# Patient Record
Sex: FEMALE | Race: OTHER | HISPANIC OR LATINO | Employment: OTHER | URBAN - METROPOLITAN AREA
[De-identification: names, ages, dates, MRNs, and addresses within clinical notes are randomized per-mention and may not be internally consistent; named-entity substitution may affect disease eponyms.]

---

## 2024-03-14 ENCOUNTER — HOSPITAL ENCOUNTER (INPATIENT)
Facility: HOSPITAL | Age: 69
LOS: 5 days | Discharge: HOME | DRG: 871 | End: 2024-03-19
Attending: STUDENT IN AN ORGANIZED HEALTH CARE EDUCATION/TRAINING PROGRAM | Admitting: HOSPITALIST
Payer: MEDICARE

## 2024-03-14 DIAGNOSIS — R79.89 ELEVATED LFTS: ICD-10-CM

## 2024-03-14 DIAGNOSIS — C54.1 ENDOMETRIAL ADENOCARCINOMA: ICD-10-CM

## 2024-03-14 DIAGNOSIS — R00.0 TACHYCARDIA: ICD-10-CM

## 2024-03-14 DIAGNOSIS — R07.9 CHEST PAIN: ICD-10-CM

## 2024-03-14 DIAGNOSIS — A41.9 SEPSIS: Primary | ICD-10-CM

## 2024-03-14 DIAGNOSIS — N17.9 AKI (ACUTE KIDNEY INJURY): ICD-10-CM

## 2024-03-14 PROBLEM — E87.6 HYPOKALEMIA: Status: ACTIVE | Noted: 2024-03-14

## 2024-03-14 PROBLEM — R73.9 HYPERGLYCEMIA: Status: ACTIVE | Noted: 2024-03-14

## 2024-03-14 PROBLEM — D50.0 NORMOCYTIC ANEMIA DUE TO BLOOD LOSS: Status: ACTIVE | Noted: 2024-03-14

## 2024-03-14 PROBLEM — R74.01 TRANSAMINITIS: Status: ACTIVE | Noted: 2024-03-14

## 2024-03-14 PROBLEM — E87.20 METABOLIC ACIDOSIS, NORMAL ANION GAP (NAG): Status: ACTIVE | Noted: 2024-03-14

## 2024-03-14 PROBLEM — R65.20 SEVERE SEPSIS: Status: ACTIVE | Noted: 2024-03-14

## 2024-03-14 LAB
ALBUMIN SERPL BCP-MCNC: 3.4 G/DL (ref 3.5–5.2)
ALLENS TEST: ABNORMAL
ALP SERPL-CCNC: 211 U/L (ref 55–135)
ALT SERPL W/O P-5'-P-CCNC: 78 U/L (ref 10–44)
ANION GAP SERPL CALC-SCNC: 11 MMOL/L (ref 8–16)
AST SERPL-CCNC: 150 U/L (ref 10–40)
BASOPHILS # BLD AUTO: 0.02 K/UL (ref 0–0.2)
BASOPHILS NFR BLD: 0.2 % (ref 0–1.9)
BILIRUB SERPL-MCNC: 1.2 MG/DL (ref 0.1–1)
BILIRUB UR QL STRIP: NEGATIVE
BUN SERPL-MCNC: 24 MG/DL (ref 8–23)
CALCIUM SERPL-MCNC: 9.5 MG/DL (ref 8.7–10.5)
CHLORIDE SERPL-SCNC: 106 MMOL/L (ref 95–110)
CLARITY UR REFRACT.AUTO: CLEAR
CO2 SERPL-SCNC: 21 MMOL/L (ref 23–29)
COLOR UR AUTO: YELLOW
CREAT SERPL-MCNC: 1.9 MG/DL (ref 0.5–1.4)
DIFFERENTIAL METHOD BLD: ABNORMAL
EOSINOPHIL # BLD AUTO: 0 K/UL (ref 0–0.5)
EOSINOPHIL NFR BLD: 0 % (ref 0–8)
ERYTHROCYTE [DISTWIDTH] IN BLOOD BY AUTOMATED COUNT: 13.9 % (ref 11.5–14.5)
EST. GFR  (NO RACE VARIABLE): 28.4 ML/MIN/1.73 M^2
GLUCOSE SERPL-MCNC: 165 MG/DL (ref 70–110)
GLUCOSE UR QL STRIP: NEGATIVE
HCT VFR BLD AUTO: 32.7 % (ref 37–48.5)
HCV AB SERPL QL IA: NORMAL
HGB BLD-MCNC: 10.1 G/DL (ref 12–16)
HGB UR QL STRIP: ABNORMAL
HIV 1+2 AB+HIV1 P24 AG SERPL QL IA: NORMAL
IMM GRANULOCYTES # BLD AUTO: 0.06 K/UL (ref 0–0.04)
IMM GRANULOCYTES NFR BLD AUTO: 0.6 % (ref 0–0.5)
INFLUENZA A, MOLECULAR: NEGATIVE
INFLUENZA B, MOLECULAR: NEGATIVE
KETONES UR QL STRIP: NEGATIVE
LACTATE SERPL-SCNC: 1.7 MMOL/L (ref 0.5–2.2)
LACTATE SERPL-SCNC: 2.1 MMOL/L (ref 0.5–2.2)
LDH SERPL L TO P-CCNC: 3.13 MMOL/L (ref 0.5–2.2)
LEUKOCYTE ESTERASE UR QL STRIP: NEGATIVE
LYMPHOCYTES # BLD AUTO: 0.2 K/UL (ref 1–4.8)
LYMPHOCYTES NFR BLD: 2.2 % (ref 18–48)
MCH RBC QN AUTO: 25.7 PG (ref 27–31)
MCHC RBC AUTO-ENTMCNC: 30.9 G/DL (ref 32–36)
MCV RBC AUTO: 83 FL (ref 82–98)
MONOCYTES # BLD AUTO: 0.2 K/UL (ref 0.3–1)
MONOCYTES NFR BLD: 2.2 % (ref 4–15)
NEUTROPHILS # BLD AUTO: 9.4 K/UL (ref 1.8–7.7)
NEUTROPHILS NFR BLD: 94.8 % (ref 38–73)
NITRITE UR QL STRIP: NEGATIVE
NRBC BLD-RTO: 0 /100 WBC
OHS QRS DURATION: 86 MS
OHS QRS DURATION: 88 MS
OHS QRS DURATION: 88 MS
OHS QTC CALCULATION: 537 MS
OHS QTC CALCULATION: 564 MS
OHS QTC CALCULATION: 579 MS
PH UR STRIP: 6 [PH] (ref 5–8)
PLATELET # BLD AUTO: 185 K/UL (ref 150–450)
PMV BLD AUTO: 11.1 FL (ref 9.2–12.9)
POTASSIUM SERPL-SCNC: 3.4 MMOL/L (ref 3.5–5.1)
PROT SERPL-MCNC: 7.6 G/DL (ref 6–8.4)
PROT UR QL STRIP: ABNORMAL
RBC # BLD AUTO: 3.93 M/UL (ref 4–5.4)
SAMPLE: ABNORMAL
SARS-COV-2 RDRP RESP QL NAA+PROBE: NEGATIVE
SITE: ABNORMAL
SODIUM SERPL-SCNC: 138 MMOL/L (ref 136–145)
SP GR UR STRIP: 1.02 (ref 1–1.03)
SPECIMEN SOURCE: NORMAL
URN SPEC COLLECT METH UR: ABNORMAL
WBC # BLD AUTO: 9.94 K/UL (ref 3.9–12.7)

## 2024-03-14 PROCEDURE — U0002 COVID-19 LAB TEST NON-CDC: HCPCS | Performed by: STUDENT IN AN ORGANIZED HEALTH CARE EDUCATION/TRAINING PROGRAM

## 2024-03-14 PROCEDURE — 99900035 HC TECH TIME PER 15 MIN (STAT)

## 2024-03-14 PROCEDURE — 83605 ASSAY OF LACTIC ACID: CPT

## 2024-03-14 PROCEDURE — 87154 CUL TYP ID BLD PTHGN 6+ TRGT: CPT | Mod: 91 | Performed by: STUDENT IN AN ORGANIZED HEALTH CARE EDUCATION/TRAINING PROGRAM

## 2024-03-14 PROCEDURE — 93010 ELECTROCARDIOGRAM REPORT: CPT | Mod: ,,, | Performed by: INTERNAL MEDICINE

## 2024-03-14 PROCEDURE — 25000003 PHARM REV CODE 250: Performed by: STUDENT IN AN ORGANIZED HEALTH CARE EDUCATION/TRAINING PROGRAM

## 2024-03-14 PROCEDURE — 87389 HIV-1 AG W/HIV-1&-2 AB AG IA: CPT | Performed by: PHYSICIAN ASSISTANT

## 2024-03-14 PROCEDURE — 80053 COMPREHEN METABOLIC PANEL: CPT | Performed by: STUDENT IN AN ORGANIZED HEALTH CARE EDUCATION/TRAINING PROGRAM

## 2024-03-14 PROCEDURE — 87502 INFLUENZA DNA AMP PROBE: CPT | Performed by: STUDENT IN AN ORGANIZED HEALTH CARE EDUCATION/TRAINING PROGRAM

## 2024-03-14 PROCEDURE — 86803 HEPATITIS C AB TEST: CPT | Performed by: PHYSICIAN ASSISTANT

## 2024-03-14 PROCEDURE — 93005 ELECTROCARDIOGRAM TRACING: CPT

## 2024-03-14 PROCEDURE — 93010 ELECTROCARDIOGRAM REPORT: CPT | Mod: 77,,, | Performed by: INTERNAL MEDICINE

## 2024-03-14 PROCEDURE — 63600175 PHARM REV CODE 636 W HCPCS: Performed by: STUDENT IN AN ORGANIZED HEALTH CARE EDUCATION/TRAINING PROGRAM

## 2024-03-14 PROCEDURE — 99285 EMERGENCY DEPT VISIT HI MDM: CPT | Mod: 25

## 2024-03-14 PROCEDURE — 12000002 HC ACUTE/MED SURGE SEMI-PRIVATE ROOM

## 2024-03-14 PROCEDURE — 63600175 PHARM REV CODE 636 W HCPCS: Performed by: HOSPITALIST

## 2024-03-14 PROCEDURE — 25000003 PHARM REV CODE 250

## 2024-03-14 PROCEDURE — 81003 URINALYSIS AUTO W/O SCOPE: CPT | Performed by: STUDENT IN AN ORGANIZED HEALTH CARE EDUCATION/TRAINING PROGRAM

## 2024-03-14 PROCEDURE — 25000003 PHARM REV CODE 250: Performed by: HOSPITALIST

## 2024-03-14 PROCEDURE — 96365 THER/PROPH/DIAG IV INF INIT: CPT

## 2024-03-14 PROCEDURE — 87040 BLOOD CULTURE FOR BACTERIA: CPT | Mod: 59 | Performed by: STUDENT IN AN ORGANIZED HEALTH CARE EDUCATION/TRAINING PROGRAM

## 2024-03-14 PROCEDURE — 85025 COMPLETE CBC W/AUTO DIFF WBC: CPT | Performed by: STUDENT IN AN ORGANIZED HEALTH CARE EDUCATION/TRAINING PROGRAM

## 2024-03-14 RX ORDER — LOSARTAN POTASSIUM AND HYDROCHLOROTHIAZIDE 25; 100 MG/1; MG/1
1 TABLET ORAL DAILY
Status: ON HOLD | COMMUNITY
End: 2024-03-19 | Stop reason: HOSPADM

## 2024-03-14 RX ORDER — AMLODIPINE BESYLATE 10 MG/1
10 TABLET ORAL DAILY
Status: ON HOLD | COMMUNITY
End: 2024-03-19 | Stop reason: HOSPADM

## 2024-03-14 RX ORDER — SODIUM CHLORIDE 0.9 % (FLUSH) 0.9 %
10 SYRINGE (ML) INJECTION EVERY 12 HOURS PRN
Status: DISCONTINUED | OUTPATIENT
Start: 2024-03-14 | End: 2024-03-19 | Stop reason: HOSPADM

## 2024-03-14 RX ORDER — NALOXONE HCL 0.4 MG/ML
0.02 VIAL (ML) INJECTION
Status: DISCONTINUED | OUTPATIENT
Start: 2024-03-14 | End: 2024-03-19 | Stop reason: HOSPADM

## 2024-03-14 RX ORDER — GLUCAGON 1 MG
1 KIT INJECTION
Status: DISCONTINUED | OUTPATIENT
Start: 2024-03-14 | End: 2024-03-19 | Stop reason: HOSPADM

## 2024-03-14 RX ORDER — IBUPROFEN 200 MG
24 TABLET ORAL
Status: DISCONTINUED | OUTPATIENT
Start: 2024-03-14 | End: 2024-03-19 | Stop reason: HOSPADM

## 2024-03-14 RX ORDER — ACETAMINOPHEN 500 MG
1000 TABLET ORAL
Status: COMPLETED | OUTPATIENT
Start: 2024-03-14 | End: 2024-03-14

## 2024-03-14 RX ORDER — ACETAMINOPHEN 500 MG
1000 TABLET ORAL EVERY 6 HOURS PRN
COMMUNITY
End: 2024-05-03

## 2024-03-14 RX ORDER — ACETAMINOPHEN 325 MG/1
650 TABLET ORAL EVERY 8 HOURS PRN
Status: DISCONTINUED | OUTPATIENT
Start: 2024-03-14 | End: 2024-03-19 | Stop reason: HOSPADM

## 2024-03-14 RX ORDER — IBUPROFEN 200 MG
16 TABLET ORAL
Status: DISCONTINUED | OUTPATIENT
Start: 2024-03-14 | End: 2024-03-19 | Stop reason: HOSPADM

## 2024-03-14 RX ORDER — TALC
6 POWDER (GRAM) TOPICAL NIGHTLY PRN
Status: DISCONTINUED | OUTPATIENT
Start: 2024-03-14 | End: 2024-03-16

## 2024-03-14 RX ADMIN — POTASSIUM BICARBONATE 40 MEQ: 391 TABLET, EFFERVESCENT ORAL at 06:03

## 2024-03-14 RX ADMIN — SODIUM CHLORIDE, POTASSIUM CHLORIDE, SODIUM LACTATE AND CALCIUM CHLORIDE 2500 ML: 600; 310; 30; 20 INJECTION, SOLUTION INTRAVENOUS at 01:03

## 2024-03-14 RX ADMIN — VANCOMYCIN HYDROCHLORIDE 1750 MG: 500 INJECTION, POWDER, LYOPHILIZED, FOR SOLUTION INTRAVENOUS at 06:03

## 2024-03-14 RX ADMIN — ACETAMINOPHEN 1000 MG: 500 TABLET ORAL at 01:03

## 2024-03-14 RX ADMIN — CEFTRIAXONE 1 G: 1 INJECTION, POWDER, FOR SOLUTION INTRAMUSCULAR; INTRAVENOUS at 01:03

## 2024-03-14 NOTE — HPI
68 year old F with PMH of post menopausal bleeding, recent pathology report noting focally necrotic high grade endometrial adenocarcinoma, presenting for fever of 103.1 and chills. Reports that yesterday started developing fever and chills and posterior head pain which she feels was a result of the rigors. Head pain has since resolved. Pt denies chest pain, sob, abdominal pain, n/v/d, constipation, fever, chills, or weakness of the extremities. Discussed cancer diagnosis with patient and stressed importance of follow up with GYN. Admitted to  for septic shock.    On presentation to [facility], the patient was alert, oriented, no complaints.  VS Normotensive, Febrile with temperature of 103.1, and tachycardic at 145 .  Initial labs notable for hgb 10.1, k 3.4, bun/cr of 24/1.9, tbili 1.2,  ALT 78, . CXR clear, pending CT head and abdomen. Urinalysis does not appear infectious. Therapy/stabilization measures were started, notable for Ceftriaxone and 2.5L fluid bolus.

## 2024-03-14 NOTE — PROGRESS NOTES
"Pharmacokinetic Initial Assessment & Plan: IV Vancomycin    Initiate IV Vancomycin 1750 mg x once   Slight ROBERTO. Subsequent doses based on random Vancomycin level  Obtain a Vancomycin random tomorrow on 03/15 @ 1200   Desired empiric serum trough concentration is 15 to 20 mcg/mL    Pharmacy will continue to follow and monitor vancomycin.    [id number] with any questions regarding this assessment.     Thank you for the consult,   Colleen Oliveira        Patient brief summary:  Cherri Gilmore is a 68 y.o. female initiated on antimicrobial therapy with IV Vancomycin for treatment of suspected sepsis    Drug Allergies:   Review of patient's allergies indicates:  Not on File    Actual Body Weight:   81.6 kg    Renal Function:   Estimated Creatinine Clearance: 29.9 mL/min (A) (based on SCr of 1.9 mg/dL (H)).,     Dialysis Method (if applicable):  N/A    CBC (last 72 hours):  Recent Labs   Lab Result Units 03/14/24  1219   WBC K/uL 9.94   Hemoglobin g/dL 10.1*   Hematocrit % 32.7*   Platelets K/uL 185   Gran % % 94.8*   Lymph % % 2.2*   Mono % % 2.2*   Eosinophil % % 0.0   Basophil % % 0.2   Differential Method  Automated       Metabolic Panel (last 72 hours):  Recent Labs   Lab Result Units 03/14/24  1219 03/14/24  1541   Sodium mmol/L 138  --    Potassium mmol/L 3.4*  --    Chloride mmol/L 106  --    CO2 mmol/L 21*  --    Glucose mg/dL 165*  --    Glucose, UA   --  Negative   BUN mg/dL 24*  --    Creatinine mg/dL 1.9*  --    Albumin g/dL 3.4*  --    Total Bilirubin mg/dL 1.2*  --    Alkaline Phosphatase U/L 211*  --    AST U/L 150*  --    ALT U/L 78*  --        Drug levels (last 3 results):  No results for input(s): "VANCOMYCINRA", "VANCORANDOM", "VANCOMYCINPE", "VANCOPEAK", "VANCOMYCINTR", "VANCOTROUGH" in the last 72 hours.    Microbiologic Results:  Microbiology Results (last 7 days)       Procedure Component Value Units Date/Time    Influenza A & B by Molecular [[id number]] Collected: 03/14/24 1327    Order Status: Completed " Specimen: Nasopharyngeal Swab Updated: 03/14/24 1657     Influenza A, Molecular Negative     Influenza B, Molecular Negative     Flu A & B Source Nasal swab    Blood culture x two cultures. Draw prior to antibiotics. [[id number]] Collected: 03/14/24 1220    Order Status: Sent Specimen: Blood from Peripheral, Forearm, Right Updated: 03/14/24 1251    Blood culture x two cultures. Draw prior to antibiotics. [[id number]] Collected: 03/14/24 1220    Order Status: Sent Specimen: Blood from Peripheral, Antecubital, Right Updated: 03/14/24 1251

## 2024-03-14 NOTE — ASSESSMENT & PLAN NOTE
This patient does have evidence of infective focus  My overall impression is sepsis.  Source: Unknown  Antibiotics given-   Antibiotics (72h ago, onward)      None          Latest lactate reviewed-  Recent Labs   Lab 03/14/24  1233   POCLAC 3.13*     Organ dysfunction indicated by Acute kidney injury and Acute liver injury    Fluid challenge Actual Body weight- Patient will receive 30ml/kg actual body weight to calculate fluid bolus for treatment of septic shock.     Post- resuscitation assessment Yes Perfusion exam was performed within 6 hours of septic shock presentation after bolus shows Adequate tissue perfusion assessed by non-invasive monitoring     UA negative, CXR negative, no skin lesions noted, CT abdomen pending. Source unclear at this point.  Plan:  - Blood cultures pending  - Trending Lactic acid q4, was 3.13 on admission  - Started on broad spectrum abx  - will monitor daily cbc and vitals

## 2024-03-14 NOTE — Clinical Note
Diagnosis: Tachycardia [200486]   Future Attending Provider: SAÚL JACKSON [[id number]]   Reason for IP Medical Treatment  (Clinical interventions that can only be accomplished in the IP setting? ) :: Sepsis   I certify that Inpatient services for greater than or equal to 2 midnights are medically necessary:: Yes   Plans for Post-Acute care--if anticipated (pick the single best option):: A. No post acute care anticipated at this time   Special Needs:: No Special Needs [1]

## 2024-03-14 NOTE — ASSESSMENT & PLAN NOTE
Patient has hypokalemia which is Acute and currently controlled. Most recent potassium levels reviewed-   Lab Results   Component Value Date    K 3.4 (L) 03/14/2024   . Will continue potassium replacement per protocol and recheck repeat levels after replacement completed.

## 2024-03-14 NOTE — ED PROVIDER NOTES
Encounter Date: 3/14/2024       History     Chief Complaint   Patient presents with    Fever    Chills    Headache     68 y.o. female with no significant past medical history presents for fever and chills for the past day.  Patient reports she had a mild posterior headache yesterday followed by fevers and chills throughout the day today.  She denies any neck stiffness.  She does report some urinary frequency without dysuria.  She denies any cough, shortness of breath, chest pain, abdominal pain, nausea/vomiting..  She does report some mild vaginal spotting for which she has seen her gynecologist.  She denies any dizziness or large volume bleeding      The history is provided by the patient and medical records.     Review of patient's allergies indicates:  No Known Allergies  Past Medical History:   Diagnosis Date    Adenocarcinoma of endometrium     Essential (primary) hypertension      Past Surgical History:   Procedure Laterality Date    DILATION AND CURETTAGE OF UTERUS      TUBAL LIGATION       Family History   Problem Relation Age of Onset    Hypertension Mother     Diabetes Mother     Stroke Father     Breast cancer Sister     Stomach cancer Brother      Social History     Tobacco Use    Smoking status: Never    Smokeless tobacco: Never   Substance Use Topics    Alcohol use: Never    Drug use: Never     Review of Systems   Reason unable to perform ROS: See HPI for relevant ROS.       Physical Exam     Initial Vitals [03/14/24 1126]   BP Pulse Resp Temp SpO2   111/62 (!) 145 18 (!) 103.1 °F (39.5 °C) 100 %      MAP       --         Physical Exam    Nursing note and vitals reviewed.  Constitutional:   Alert, normal work of breathing, no acute distress   HENT:   Oropharynx dry   Eyes: Conjunctivae are normal. No scleral icterus.   Cardiovascular:            Tachycardic   Pulmonary/Chest: Breath sounds normal. No stridor. No respiratory distress.   Abdominal: Abdomen is soft. She exhibits no distension. There is no  abdominal tenderness.   Musculoskeletal:         General: No edema.     Neurological: She is alert.   Skin: Skin is warm and dry.         ED Course   Critical Care    Date/Time: 3/14/2024 11:23 AM    Performed by: Grzegorz Altman MD  Authorized by: Grzegorz Altman MD  Direct patient critical care time: 10 minutes  Additional history critical care time: 5 minutes  Ordering / reviewing critical care time: 5 minutes  Documentation critical care time: 5 minutes  Consult with family critical care time: 5 minutes  Other critical care time: 5 minutes  Total critical care time (exclusive of procedural time) : 35 minutes  Critical care time was exclusive of separately billable procedures and treating other patients.  Critical care was necessary to treat or prevent imminent or life-threatening deterioration of the following conditions: sepsis.  Critical care was time spent personally by me on the following activities: development of treatment plan with patient or surrogate, obtaining history from patient or surrogate, ordering and performing treatments and interventions, ordering and review of laboratory studies, evaluation of patient's response to treatment, examination of patient, re-evaluation of patient's condition, review of old charts and pulse oximetry.        Labs Reviewed   CBC W/ AUTO DIFFERENTIAL - Abnormal; Notable for the following components:       Result Value    RBC 3.93 (*)     Hemoglobin 10.1 (*)     Hematocrit 32.7 (*)     MCH 25.7 (*)     MCHC 30.9 (*)     Immature Granulocytes 0.6 (*)     Gran # (ANC) 9.4 (*)     Immature Grans (Abs) 0.06 (*)     Lymph # 0.2 (*)     Mono # 0.2 (*)     Gran % 94.8 (*)     Lymph % 2.2 (*)     Mono % 2.2 (*)     All other components within normal limits   COMPREHENSIVE METABOLIC PANEL - Abnormal; Notable for the following components:    Potassium 3.4 (*)     CO2 21 (*)     Glucose 165 (*)     BUN 24 (*)     Creatinine 1.9 (*)     Albumin 3.4 (*)     Total Bilirubin 1.2 (*)      Alkaline Phosphatase 211 (*)      (*)     ALT 78 (*)     eGFR 28.4 (*)     All other components within normal limits   URINALYSIS, REFLEX TO URINE CULTURE - Abnormal; Notable for the following components:    Protein, UA Trace (*)     Occult Blood UA Trace (*)     All other components within normal limits    Narrative:     Specimen Source->Urine   HEMOGLOBIN A1C - Abnormal; Notable for the following components:    Hemoglobin A1C 5.7 (*)     All other components within normal limits   LIPID PANEL - Abnormal; Notable for the following components:    Cholesterol 98 (*)     HDL 28 (*)     LDL Cholesterol 52.6 (*)     All other components within normal limits   COMPREHENSIVE METABOLIC PANEL - Abnormal; Notable for the following components:    CO2 22 (*)     BUN 30 (*)     Creatinine 1.6 (*)     Albumin 2.9 (*)     Alkaline Phosphatase 159 (*)     AST 60 (*)     ALT 56 (*)     eGFR 34.9 (*)     Anion Gap 7 (*)     All other components within normal limits   CBC W/ AUTO DIFFERENTIAL - Abnormal; Notable for the following components:    WBC 13.72 (*)     RBC 3.44 (*)     Hemoglobin 8.9 (*)     Hematocrit 28.2 (*)     MCH 25.9 (*)     MCHC 31.6 (*)     Gran # (ANC) 11.4 (*)     Immature Grans (Abs) 0.06 (*)     Gran % 83.3 (*)     Lymph % 8.9 (*)     All other components within normal limits   IRON AND TIBC - Abnormal; Notable for the following components:    Iron 15 (*)     Saturated Iron 5 (*)     All other components within normal limits   ISTAT LACTATE - Abnormal; Notable for the following components:    POC Lactate 3.13 (*)     All other components within normal limits   INFLUENZA A & B BY MOLECULAR   HIV 1 / 2 ANTIBODY    Narrative:     Release to patient->Immediate   HEPATITIS C ANTIBODY    Narrative:     Release to patient->Immediate   SARS-COV-2 RNA AMPLIFICATION, QUAL   LACTIC ACID, PLASMA   LACTIC ACID, PLASMA   LACTIC ACID, PLASMA   LACTIC ACID, PLASMA   MAGNESIUM   PHOSPHORUS   FERRITIN   VANCOMYCIN,  RANDOM     EKG Readings: (Independently Interpreted)   Sinus tachycardia, regular, narrow complex, rate of 137, no STEMI, nonspecific ST changes diffusely, no prior available for comparison     ECG Results              EKG 12-lead (Final result)        Collection Time Result Time QRS Duration OHS QTC Calculation    03/14/24 12:10:55 03/14/24 19:49:37 86 537                     Final result by Interface, Lab In [facility] (03/14/24 19:49:39)                   Narrative:    Test Reason :     Vent. Rate : 140 BPM     Atrial Rate : 141 BPM     P-R Int : 000 ms          QRS Dur : 086 ms      QT Int : 352 ms       P-R-T Axes : 000 059 035 degrees     QTc Int : 537 ms    Supraventricular tachycardia  Otherwise normal ECG  When compared with ECG of 14-MAR-2024 12:10,  No significant change was found  Confirmed by Mark CHARLTON MD ([id number]) on 3/14/2024 7:49:35 PM    Referred By: [email or web address]   SELF           Confirmed By:Mark CHARLTON MD                                     EKG 12-lead (Final result)        Collection Time Result Time QRS Duration OHS QTC Calculation    03/14/24 12:10:40 03/14/24 19:50:34 88 564                     Final result by Interface, Lab In [facility] (03/14/24 19:50:37)                   Narrative:    Test Reason : R00.0,    Vent. Rate : 140 BPM     Atrial Rate : 136 BPM     P-R Int : 000 ms          QRS Dur : 088 ms      QT Int : 370 ms       P-R-T Axes : 000 056 044 degrees     QTc Int : 564 ms    Supraventricular tachycardia vs ST  Non-specific ST-T abn  Abnormal ECG  When compared with ECG of 14-MAR-2024 11:31,  No significant change was found  Confirmed by Mark CHARLTON MD ([id number]) on 3/14/2024 7:50:29 PM    Referred By: [email or web address]   SELF           Confirmed By:Mark CHARLTON MD                                     EKG 12-lead (Final result)        Collection Time Result Time QRS Duration OHS QTC Calculation    03/14/24 11:31:46 03/14/24 12:42:53 88 579                     Final result by Interface, Lab In [facility]  (03/14/24 12:43:01)                   Narrative:    Test Reason : A41.9,    Vent. Rate : 137 BPM     Atrial Rate : 137 BPM     P-R Int : 154 ms          QRS Dur : 088 ms      QT Int : 384 ms       P-R-T Axes : 049 067 039 degrees     QTc Int : 579 ms    Sinus tachycardia  Low voltage QRS  Abnormal ECG  When compared with ECG of 17-APR-1998 16:11,  Rate is faster  Confirmed by DIOGENES COSTA MD ([id number]) on 3/14/2024 12:42:51 PM    Referred By:             Confirmed By:DIOGENES CSOTA MD                                  Imaging Results              US Retroperitoneal Complete (Final result)  Result time 03/14/24 22:13:48      Final result by Susu Garcia MD (03/14/24 22:13:48)                   Impression:      Elevated bilateral renal indices.  Findings may suggest medical renal disease.      Electronically signed by: Susu Garcia  Date:    03/14/2024  Time:    22:13               Narrative:    EXAMINATION:  ULTRASOUND RETROPERITONEAL COMPLETE    CLINICAL HISTORY:  elaine;    TECHNIQUE:  Real-time ultrasound of the retroperitoneum was performed, including the urinary bladder and kidneys.    COMPARISON:  CT abdomen pelvis 03/14/2024    FINDINGS:  Right kidney measures 12.2 cm.  The resistive index is 0.82.  There is no hydronephrosis.    Left kidney measures 11.7.  The resistive index is 0.79.  There is no hydronephrosis.    The bladder is within normal limits.                                       US Liver with Doppler (xpd) (Final result)  Result time 03/15/24 00:55:23      Final result by Bradley Moctezuma MD (03/15/24 00:55:23)                   Impression:      Satisfactory Doppler evaluation of the liver.    Mild hepatosplenomegaly with splenic collaterals.    Prominence of the common bile duct, which can be seen post cholecystectomy.    Additional findings within the body of the report.    Electronically signed by resident: Virginia Cui  Date:    03/15/2024  Time:    00:39    Electronically signed  by: Bradley Moctezuma  Date:    03/15/2024  Time:    00:55               Narrative:    EXAMINATION:  US LIVER WITH DOPPLER    CLINICAL HISTORY:  transaminitis;    TECHNIQUE:  Complete abdominal ultrasound with doppler.  Color and spectral Doppler were performed.    COMPARISON:  CT 02/14/2023    FINDINGS:  Pancreas: The visualized portion of the pancreas is unremarkable.    Liver: Enlarged measuring 19.3 cm.  The liver demonstrates homogeneous echotexture.  HRI measures 0.8.  No focal hepatic lesions are seen.    Biliary system: Gallbladder has been surgically removed.  The common duct prominent measuring 8 mm, which can be seen with cholecystectomy.  No dilated intrahepatic radicles are seen.    The spleen is mildly enlarged in size measuring 13.5 x 3.2 cm with a homogeneous echotexture.    The aorta tapers normally.    The kidneys are normal in size without focal abnormality or evidence of hydronephrosis.    There is no evidence of ascites.    The main portal vein, right portal vein, left portal vein, middle hepatic vein, right hepatic vein, left hepatic vein, SMV, and IVC are patent with proper directional flow.  Main portal vein reaches peak systolic velocity of 38.6 cm/sec, upper limits of normal.  The main hepatic artery is patent. The umbilical vein is not patent.                                        CT Abdomen Pelvis  Without Contrast (Final result)  Result time 03/14/24 18:37:53      Final result by Susu Garcia MD (03/14/24 18:37:53)                   Impression:      Mild hepatosplenomegaly.  Mild hepatic steatosis.    Hepatic steatosis.    Uterine leiomyomas.  Associated mass effect on the urinary bladder.    Small hiatal hernia.    4 mm right middle lobe pulmonary nodule.  For a solid nodule <6 mm, Fleischner Society 2017 guidelines recommend no routine follow up for a low risk patient, or follow-up with non-contrast chest CT at 12 months in a high risk patient.    This report was flagged in Epic  as abnormal.      Electronically signed by: Susu Garcia  Date:    03/14/2024  Time:    18:37               Narrative:    EXAMINATION:  CT ABDOMEN PELVIS WITHOUT    CLINICAL HISTORY:  Fever, chills, and headache.  Urinary frequency without dysuria.  Mild vaginal spotting.    TECHNIQUE:  5 mm unenhanced axial images from the lung bases through the greater trochanters were performed.  Coronal and sagittal reformatted images were provided.    COMPARISON:  None.    FINDINGS:  Within the limits of a noncontrast examination, there is mild hepatic steatosis.  There is mild hepatosplenomegaly.    Pancreas, kidneys, and adrenal glands are unremarkable.  The gallbladder is surgically absent.    There is no gross abdominal adenopathy or ascites.    The uterus is lobular and moderately enlarged.  It exerts mass effect on the urinary bladder.  There is no free pelvic fluid.  The visualized appendix is not inflamed.    At the lung bases, there is mild bibasilar atelectatic change.  A 4 mm nodule is seen in the right middle lobe (series 2 axial image 24).  There is mild pleural thickening.  Calcified mediastinal and left hilar lymph nodes are present.    There is a small hiatal hernia.                                       CT Head Without Contrast (Final result)  Result time 03/14/24 18:16:59      Final result by Bradley Moctezuma MD (03/14/24 18:16:59)                   Impression:      No acute abnormality.      Electronically signed by: Bradley Moctezuma  Date:    03/14/2024  Time:    18:16               Narrative:    EXAMINATION:  CT HEAD WITHOUT CONTRAST    CLINICAL HISTORY:  Headache, new or worsening (Age >= 50y);    TECHNIQUE:  Low dose axial CT images obtained throughout the head without intravenous contrast. Sagittal and coronal reconstructions were performed.    COMPARISON:  None.    FINDINGS:  Intracranial compartment:    Ventricles and sulci are normal in size for age without evidence of hydrocephalus. No  extra-axial blood or fluid collections.    The brain parenchyma appears normal. No parenchymal mass, hemorrhage, edema or major vascular distribution infarct.    Skull/extracranial contents (limited evaluation): No fracture. Mastoid air cells and paranasal sinuses are essentially clear.  Right parietal osteoma                                       X-Ray Chest AP Portable (Final result)  Result time 03/14/24 13:24:00      Final result by Sherman Glass Jr., MD (03/14/24 13:24:00)                   Impression:      No convincing abnormality identified.  Probable atelectasis left base.      Electronically signed by: Sherman Glass MD  Date:    03/14/2024  Time:    13:24               Narrative:    EXAMINATION:  XR CHEST AP PORTABLE    CLINICAL HISTORY:  Sepsis;    TECHNIQUE:  Single frontal view of the chest was performed.    COMPARISON:  None    FINDINGS:  Monitoring leads in place.  Heart size pulmonary vessels are normal.  Lungs are fairly well aerated.  Few subtle markings left lung base likely atelectasis.  No pneumothorax.                                       Medications   cefTRIAXone (Rocephin) 1 g in dextrose 5 % in water (D5W) 100 mL IVPB (MB+) (0 g Intravenous Stopped 3/14/24 1347)   lactated ringers bolus 2,500 mL (0 mLs Intravenous Stopped 3/14/24 1658)   acetaminophen tablet 1,000 mg (1,000 mg Oral Given 3/14/24 1332)   potassium bicarbonate disintegrating tablet 40 mEq (40 mEq Oral Given 3/14/24 1818)   vancomycin 1.75 g in 5 % dextrose 500 mL IVPB (0 mg Intravenous Stopped 3/14/24 2023)   iohexoL (OMNIPAQUE 350) injection 75 mL (75 mLs Intravenous Given 3/16/24 0512)     Medical Decision Making  68 y.o. female with no significant past medical history presents for fever and chills for the past day  Patient with symptoms concerning for sepsis  Sepsis protocols initiated, patient started on IV fluids, IV antibiotics, broad workup including blood cultures ordered, will evaluate for possible source.     Differentials include pneumonia, UTI, viral illness, metabolic derangement, doubt meningitis/encephalitis  IV antibiotics were given empirically  Full 30 cc/kg bolus was ordered as patient does not have contraindications to aggressive IV fluid resuscitation  Patient presenting tachycardic afebrile but otherwise well-appearing, alert.  Blood pressure within normal limits.  Report some mild neck discomfort yesterday but no neck pain or headache currently, no neck stiffness, less consistent with encephalitis  Abdominal exam overall benign, patient was found to have elevated LFTs, CT abdomen pelvis ordered to evaluate further.  Also found to have ROBERTO. No clear etiology of her infection was found.  Continued IV antibiotics, admitted to hospital medicine        Amount and/or Complexity of Data Reviewed  Labs: ordered. Decision-making details documented in ED Course.  Radiology: ordered. Decision-making details documented in ED Course.    Risk  OTC drugs.  Decision regarding hospitalization.               ED Course as of 03/29/24 2057   Thu Mar 14, 2024   1232 Temp(!): 103.1 °F (39.5 °C) [OK]   1232 Pulse(!): 145 [OK]   1317 POC Lactate(!): 3.13 [OK]   1317 CBC auto differential(!)  Leukocytosis  [OK]   1318 X-Ray Chest AP Portable  Findings, per independent interpretation:  No focal consolidation or pulmonary edema [OK]   1357 Comprehensive metabolic panel(!)  ROBERTO, mild hypokalemia, elevated LFTs [OK]   1522 On re-evaluation, heart rate has improved [OK]      ED Course User Index  [OK] Grzegorz Altman MD                           Clinical Impression:  Final diagnoses:  [R00.0] Tachycardia  [A41.9] Sepsis (Primary)  [R79.89] Elevated LFTs  [N17.9] ROBERTO (acute kidney injury)          ED Disposition Condition    Admit                    Grzegorz Altman MD  03/29/24 2057

## 2024-03-14 NOTE — H&P
Doron Luciano - Emergency Dept  [facility] Medicine  History & Physical    Patient Name: Cherri Gilmore  MRN: [id number]  Patient Class: IP- Inpatient  Admission Date: 3/14/2024  Attending Physician: Shellie Murillo*   Primary Care Provider: Winter, Primary Doctor         Patient information was obtained from patient and ER records.     Subjective:     Principal Problem:<principal problem not specified>    Chief Complaint:   Chief Complaint   Patient presents with    Fever    Chills    Headache        HPI: 68 year old F with PMH of post menopausal bleeding, recent pathology report noting focally necrotic high grade endometrial adenocarcinoma, presenting for fever of 103.1 and chills. Reports that yesterday started developing fever and chills and posterior head pain which she feels was a result of the rigors. Head pain has since resolved. Pt denies chest pain, sob, abdominal pain, n/v/d, constipation, fever, chills, or weakness of the extremities. Discussed cancer diagnosis with patient and stressed importance of follow up with GYN. Admitted to  for septic shock.    On presentation to [facility], the patient was alert, oriented, no complaints.  VS Normotensive, Febrile with temperature of 103.1, and tachycardic at 145 .  Initial labs notable for hgb 10.1, k 3.4, bun/cr of 24/1.9, tbili 1.2,  ALT 78, . CXR clear, pending CT head and abdomen. Urinalysis does not appear infectious. Therapy/stabilization measures were started, notable for Ceftriaxone and 2.5L fluid bolus.      Past Medical History:   Diagnosis Date    Adenocarcinoma of endometrium     Essential (primary) hypertension        History reviewed. No pertinent surgical history.    Review of patient's allergies indicates:  Not on File    No current facility-administered medications on file prior to encounter.     No current outpatient medications on file prior to encounter.     Family History    None       Tobacco Use    Smoking status: Never     Smokeless tobacco: Never   Substance and Sexual Activity    Alcohol use: Never    Drug use: Never    Sexual activity: Not Currently     Review of Systems   Constitutional:  Negative for activity change, appetite change, chills, fatigue and fever.   Eyes:  Negative for photophobia and visual disturbance.   Respiratory:  Negative for cough, chest tightness, shortness of breath and wheezing.    Cardiovascular:  Negative for chest pain.   Gastrointestinal:  Negative for abdominal distention, abdominal pain, blood in stool, constipation, diarrhea, nausea and vomiting.   Genitourinary:  Positive for vaginal bleeding. Negative for difficulty urinating and dysuria.   Musculoskeletal:  Negative for gait problem.   Skin:  Negative for color change.   Neurological:  Negative for dizziness, weakness, light-headedness and headaches.   Psychiatric/Behavioral:  Negative for agitation, behavioral problems and confusion.      Objective:     Vital Signs (Most Recent):  Temp: 98.9 °F (37.2 °C) (03/14/24 1822)  Pulse: 104 (03/14/24 1822)  Resp: 18 (03/14/24 1822)  BP: 124/64 (03/14/24 1822)  SpO2: 98 % (03/14/24 1822) Vital Signs (24h Range):  Temp:  [98.5 °F (36.9 °C)-103.4 °F (39.7 °C)] 98.9 °F (37.2 °C)  Pulse:  [100-145] 104  Resp:  [16-20] 18  SpO2:  [96 %-100 %] 98 %  BP: (100-127)/(59-64) 124/64     Weight: 81.6 kg (180 lb)  Body mass index is 29.95 kg/m².     Physical Exam  Vitals and nursing note reviewed.   Constitutional:       General: She is not in acute distress.     Appearance: Normal appearance. She is not ill-appearing.   HENT:      Head: Normocephalic and atraumatic.      Right Ear: External ear normal.      Left Ear: External ear normal.      Nose: Nose normal.   Eyes:      General: No scleral icterus.     Conjunctiva/sclera: Conjunctivae normal.      Pupils: Pupils are equal, round, and reactive to light.   Cardiovascular:      Rate and Rhythm: Normal rate and regular rhythm.   Pulmonary:      Effort: Pulmonary  effort is normal. No respiratory distress.   Abdominal:      General: Abdomen is flat. There is no distension.   Musculoskeletal:         General: Normal range of motion.      Cervical back: Normal range of motion.      Right lower leg: No edema.      Left lower leg: No edema.   Skin:     Coloration: Skin is not jaundiced.      Findings: No bruising.   Neurological:      Mental Status: She is alert and oriented to person, place, and time. Mental status is at baseline.              CRANIAL NERVES     CN III, IV, VI   Pupils are equal, round, and reactive to light.       Significant Labs: All pertinent labs within the past 24 hours have been reviewed.    Significant Imaging: I have reviewed all pertinent imaging results/findings within the past 24 hours.  Assessment/Plan:     * Severe sepsis  This patient does have evidence of infective focus  My overall impression is sepsis.  Source: Unknown  Antibiotics given-   Antibiotics (72h ago, onward)      None          Latest lactate reviewed-  Recent Labs   Lab 03/14/24  1233   POCLAC 3.13*     Organ dysfunction indicated by Acute kidney injury and Acute liver injury    Fluid challenge Actual Body weight- Patient will receive 30ml/kg actual body weight to calculate fluid bolus for treatment of septic shock.     Post- resuscitation assessment Yes Perfusion exam was performed within 6 hours of septic shock presentation after bolus shows Adequate tissue perfusion assessed by non-invasive monitoring     UA negative, CXR negative, no skin lesions noted, CT abdomen pending. Source unclear at this point.  Plan:  - Blood cultures pending  - Trending Lactic acid q4, was 3.13 on admission  - Started on broad spectrum abx  - will monitor daily cbc and vitals    Hyperglycemia  Elevated on admission at 165. No reported hx of diabetes though patient has not followed with a physician.    Plan:  - HbA1c ordered   - Will monitor blood glucose levels      Transaminitis  Elevated LFT's noted  on admission, unclear what her baseline is due to no recent labs    Plan:  - Will order liver ultrasound with doppler  - CMP daily to trend LFT's  - Patient not on any medications to cause LFT change    ROBERTO (acute kidney injury)  Patient with acute kidney injury/acute renal failure likely due to  unknown.  ROBERTO is currently stable. Baseline creatinine unknown - Labs reviewed- Renal function/electrolytes with Estimated Creatinine Clearance: 29.9 mL/min (A) (based on SCr of 1.9 mg/dL (H)). according to latest data. Monitor urine output and serial BMP and adjust therapy as needed. Avoid nephrotoxins and renally dose meds for GFR listed above.    Plan:   Lab Results   Component Value Date    CREATININE 1.9 (H) 03/14/2024     - CMP daily  - Strict I&Os and daily weights   - Gentle IVF  - Avoid nephrotoxic agents such as NSAIDs, gadolinium and IV radiocontrast.  - Renally dose meds to current GFR.  - Maintain MAP > 65.      Metabolic acidosis, normal anion gap (NAG)  Low CO2 noted on labs of 21. Will continue to monitor with daily cmps      Hypokalemia  Patient has hypokalemia which is Acute and currently controlled. Most recent potassium levels reviewed-   Lab Results   Component Value Date    K 3.4 (L) 03/14/2024   . Will continue potassium replacement per protocol and recheck repeat levels after replacement completed.     Normocytic anemia due to blood loss  Patient's anemia is currently controlled. Has not received any PRBCs to date. Etiology likely d/t chronic blood loss  Current CBC reviewed-   Lab Results   Component Value Date    HGB 10.1 (L) 03/14/2024    HCT 32.7 (L) 03/14/2024     Monitor serial CBC and transfuse if patient becomes hemodynamically unstable, symptomatic or H/H drops below 7/21.    Plan:  - Iron studies ordered  - Has had vaginal bleeding for over a year, recently dx with uterine adenocarcinoma, obgyn consulted for further recommendatiosn  - holding dvt prophylaxis until CT scans are read to rule  out active bleed    Endometrial adenocarcinoma  The endometrial biopsy reveals extensive replacement of normal endometrial tissue by a high-grade/poorly differentiated adenocarcinoma with pleomorphic nuclear features in some of the areas examined. Mitotic activity is brisk (up to 6 mitoses in a single high power field). There are scattered areas of necrosis throughout the tumor. Per OBGYN note from today was trying to reach patient to discuss test results, patient was not able to answer the phone.     Plan:  - OBGYN consulted for recommendations on whether this patient will need inpatient intervention        VTE Risk Mitigation (From admission, onward)           Ordered     IP VTE HIGH RISK PATIENT  Once         03/14/24 1807     Place sequential compression device  Until discontinued         03/14/24 1807     Reason for No Pharmacological VTE Prophylaxis  Once        Question:  Reasons:  Answer:  Active Bleeding    03/14/24 1807                               Pharmacokinetic Initial Assessment & Plan: IV Vancomycin    Initiate IV Vancomycin 1750 mg x once   Slight ROBERTO. Subsequent doses based on random Vancomycin level  Obtain a Vancomycin random tomorrow on 03/15 @ 1200   Desired empiric serum trough concentration is 15 to 20 mcg/mL    Pharmacy will continue to follow and monitor vancomycin.    [id number] with any questions regarding this assessment.     Thank you for the consult,   Colleen Oliveira        Patient brief summary:  Cherri Gilmore is a 68 y.o. female initiated on antimicrobial therapy with IV Vancomycin for treatment of suspected sepsis    Drug Allergies:   Review of patient's allergies indicates:  Not on File    Actual Body Weight:   81.6 kg    Renal Function:   Estimated Creatinine Clearance: 29.9 mL/min (A) (based on SCr of 1.9 mg/dL (H)).,     Dialysis Method (if applicable):  N/A    CBC (last 72 hours):  Recent Labs   Lab Result Units 03/14/24  1219   WBC K/uL 9.94   Hemoglobin g/dL 10.1*   Hematocrit %  "32.7*   Platelets K/uL 185   Gran % % 94.8*   Lymph % % 2.2*   Mono % % 2.2*   Eosinophil % % 0.0   Basophil % % 0.2   Differential Method  Automated       Metabolic Panel (last 72 hours):  Recent Labs   Lab Result Units 03/14/24  1219 03/14/24  1541   Sodium mmol/L 138  --    Potassium mmol/L 3.4*  --    Chloride mmol/L 106  --    CO2 mmol/L 21*  --    Glucose mg/dL 165*  --    Glucose, UA   --  Negative   BUN mg/dL 24*  --    Creatinine mg/dL 1.9*  --    Albumin g/dL 3.4*  --    Total Bilirubin mg/dL 1.2*  --    Alkaline Phosphatase U/L 211*  --    AST U/L 150*  --    ALT U/L 78*  --        Drug levels (last 3 results):  No results for input(s): "VANCOMYCINRA", "VANCORANDOM", "VANCOMYCINPE", "VANCOPEAK", "VANCOMYCINTR", "VANCOTROUGH" in the last 72 hours.    Microbiologic Results:  Microbiology Results (last 7 days)       Procedure Component Value Units Date/Time    Influenza A & B by Molecular [[id number]] Collected: 03/14/24 1327    Order Status: Completed Specimen: Nasopharyngeal Swab Updated: 03/14/24 1657     Influenza A, Molecular Negative     Influenza B, Molecular Negative     Flu A & B Source Nasal swab    Blood culture x two cultures. Draw prior to antibiotics. [[id number]] Collected: 03/14/24 1220    Order Status: Sent Specimen: Blood from Peripheral, Forearm, Right Updated: 03/14/24 1251    Blood culture x two cultures. Draw prior to antibiotics. [[id number]] Collected: 03/14/24 1220    Order Status: Sent Specimen: Blood from Peripheral, Antecubital, Right Updated: 03/14/24 1251              Zbigniew Jim MD  Department of Hospital Medicine  [facility] - Emergency Dept          "

## 2024-03-14 NOTE — ASSESSMENT & PLAN NOTE
The endometrial biopsy reveals extensive replacement of normal endometrial tissue by a high-grade/poorly differentiated adenocarcinoma with pleomorphic nuclear features in some of the areas examined. Mitotic activity is brisk (up to 6 mitoses in a single high power field). There are scattered areas of necrosis throughout the tumor. Per OBGYN note from today was trying to reach patient to discuss test results, patient was not able to answer the phone.     Plan:  - OBGYN consulted for recommendations on whether this patient will need inpatient intervention

## 2024-03-14 NOTE — ED NOTES
Assumed care of the patient. Report received from JAVAD Sweeney. Pt on continuous cardiac monitoring, continuous pulse oximetry, and automatic BP cuff cycling Q30min. Pt in hospital gown, side rails up X2, bed low and locked, and call light is placed within reach. One family/visitors at bedside at this time. Pt denies any complaints or needs.

## 2024-03-14 NOTE — ED NOTES
Patient identifiers for Cherri Gilmore 68 y.o. female checked and correct.  Chief Complaint   Patient presents with    Fever    Chills    Headache     No past medical history on file.  Allergies reported: Review of patient's allergies indicates:  Not on File      LOC: Patient is awake, alert, and aware of environment with an appropriate affect. Patient is oriented x 4 and speaking appropriately.  APPEARANCE: Patient resting comfortably and in no acute distress. Patient is clean and well groomed, patient's clothing is properly fastened.  HEENT: WDL. Pt feels hot to touch, oral temp of 103.4  SKIN: The skin is warm and dry. Patient has normal skin turgor and moist mucus membranes.   MUSKULOSKELETAL: Patient is moving all extremities well, no obvious deformities noted. Pulses intact. Pt endorses weakness.   RESPIRATORY: Airway is open and patent. Respirations are spontaneous and non-labored with normal effort and rate.  CARDIAC: Patient tachycardic, 140 on cardiac monitor. Generalized peripheral edema noted.   ABDOMEN: Mild distention noted. Soft and non-tender upon palpation.Pt endorses vaginal bleeding.   NEUROLOGICAL: pupils 3mm, PERRL. Facial expression is symmetrical. Hand grasps are equal bilaterally. Normal sensation in all extremities when touched with finger.

## 2024-03-14 NOTE — SUBJECTIVE & OBJECTIVE
No past medical history on file.    No past surgical history on file.    Review of patient's allergies indicates:  Not on File    No current facility-administered medications on file prior to encounter.     No current outpatient medications on file prior to encounter.     Family History    None       Tobacco Use    Smoking status: Not on file    Smokeless tobacco: Not on file   Substance and Sexual Activity    Alcohol use: Not on file    Drug use: Not on file    Sexual activity: Not on file     Review of Systems   Constitutional:  Negative for activity change, appetite change, chills, fatigue and fever.   Eyes:  Negative for photophobia and visual disturbance.   Respiratory:  Negative for cough, chest tightness, shortness of breath and wheezing.    Cardiovascular:  Negative for chest pain.   Gastrointestinal:  Negative for abdominal distention, abdominal pain, blood in stool, constipation, diarrhea, nausea and vomiting.   Genitourinary:  Positive for vaginal bleeding. Negative for difficulty urinating and dysuria.   Musculoskeletal:  Negative for gait problem.   Skin:  Negative for color change.   Neurological:  Negative for dizziness, weakness, light-headedness and headaches.   Psychiatric/Behavioral:  Negative for agitation, behavioral problems and confusion.      Objective:     Vital Signs (Most Recent):  Temp: 98.5 °F (36.9 °C) (03/14/24 1730)  Pulse: 100 (03/14/24 1730)  Resp: 20 (03/14/24 1730)  BP: 112/63 (03/14/24 1730)  SpO2: 100 % (03/14/24 1730) Vital Signs (24h Range):  Temp:  [98.5 °F (36.9 °C)-103.4 °F (39.7 °C)] 98.5 °F (36.9 °C)  Pulse:  [100-145] 100  Resp:  [16-20] 20  SpO2:  [96 %-100 %] 100 %  BP: (100-127)/(59-63) 112/63     Weight: 81.6 kg (180 lb)  Body mass index is 29.95 kg/m².     Physical Exam  Vitals and nursing note reviewed.   Constitutional:       General: She is not in acute distress.     Appearance: Normal appearance. She is not ill-appearing.   HENT:      Head: Normocephalic and  atraumatic.      Right Ear: External ear normal.      Left Ear: External ear normal.      Nose: Nose normal.   Eyes:      General: No scleral icterus.     Conjunctiva/sclera: Conjunctivae normal.      Pupils: Pupils are equal, round, and reactive to light.   Cardiovascular:      Rate and Rhythm: Normal rate and regular rhythm.   Pulmonary:      Effort: Pulmonary effort is normal. No respiratory distress.   Abdominal:      General: Abdomen is flat. There is no distension.   Musculoskeletal:         General: Normal range of motion.      Cervical back: Normal range of motion.      Right lower leg: No edema.      Left lower leg: No edema.   Skin:     Coloration: Skin is not jaundiced.      Findings: No bruising.   Neurological:      Mental Status: She is alert and oriented to person, place, and time. Mental status is at baseline.              CRANIAL NERVES     CN III, IV, VI   Pupils are equal, round, and reactive to light.       Significant Labs: All pertinent labs within the past 24 hours have been reviewed.    Significant Imaging: I have reviewed all pertinent imaging results/findings within the past 24 hours.

## 2024-03-14 NOTE — PROVIDER PROGRESS NOTES - EMERGENCY DEPT.
Encounter Date: 3/14/2024    ED Physician Progress Notes         EKG - STEMI Decision  Initial Reading: No STEMI present.

## 2024-03-14 NOTE — SUBJECTIVE & OBJECTIVE
Past Medical History:   Diagnosis Date    Adenocarcinoma of endometrium     Essential (primary) hypertension        History reviewed. No pertinent surgical history.    Review of patient's allergies indicates:  Not on File    No current facility-administered medications on file prior to encounter.     No current outpatient medications on file prior to encounter.     Family History    None       Tobacco Use    Smoking status: Never    Smokeless tobacco: Never   Substance and Sexual Activity    Alcohol use: Never    Drug use: Never    Sexual activity: Not Currently     Review of Systems   Constitutional:  Negative for activity change, appetite change, chills, fatigue and fever.   Eyes:  Negative for photophobia and visual disturbance.   Respiratory:  Negative for cough, chest tightness, shortness of breath and wheezing.    Cardiovascular:  Negative for chest pain.   Gastrointestinal:  Negative for abdominal distention, abdominal pain, blood in stool, constipation, diarrhea, nausea and vomiting.   Genitourinary:  Positive for vaginal bleeding. Negative for difficulty urinating and dysuria.   Musculoskeletal:  Negative for gait problem.   Skin:  Negative for color change.   Neurological:  Negative for dizziness, weakness, light-headedness and headaches.   Psychiatric/Behavioral:  Negative for agitation, behavioral problems and confusion.      Objective:     Vital Signs (Most Recent):  Temp: 98.9 °F (37.2 °C) (03/14/24 1822)  Pulse: 104 (03/14/24 1822)  Resp: 18 (03/14/24 1822)  BP: 124/64 (03/14/24 1822)  SpO2: 98 % (03/14/24 1822) Vital Signs (24h Range):  Temp:  [98.5 °F (36.9 °C)-103.4 °F (39.7 °C)] 98.9 °F (37.2 °C)  Pulse:  [100-145] 104  Resp:  [16-20] 18  SpO2:  [96 %-100 %] 98 %  BP: (100-127)/(59-64) 124/64     Weight: 81.6 kg (180 lb)  Body mass index is 29.95 kg/m².     Physical Exam  Vitals and nursing note reviewed.   Constitutional:       General: She is not in acute distress.     Appearance: Normal  appearance. She is not ill-appearing.   HENT:      Head: Normocephalic and atraumatic.      Right Ear: External ear normal.      Left Ear: External ear normal.      Nose: Nose normal.   Eyes:      General: No scleral icterus.     Conjunctiva/sclera: Conjunctivae normal.      Pupils: Pupils are equal, round, and reactive to light.   Cardiovascular:      Rate and Rhythm: Normal rate and regular rhythm.   Pulmonary:      Effort: Pulmonary effort is normal. No respiratory distress.   Abdominal:      General: Abdomen is flat. There is no distension.   Musculoskeletal:         General: Normal range of motion.      Cervical back: Normal range of motion.      Right lower leg: No edema.      Left lower leg: No edema.   Skin:     Coloration: Skin is not jaundiced.      Findings: No bruising.   Neurological:      Mental Status: She is alert and oriented to person, place, and time. Mental status is at baseline.              CRANIAL NERVES     CN III, IV, VI   Pupils are equal, round, and reactive to light.       Significant Labs: All pertinent labs within the past 24 hours have been reviewed.    Significant Imaging: I have reviewed all pertinent imaging results/findings within the past 24 hours.

## 2024-03-14 NOTE — ASSESSMENT & PLAN NOTE
Elevated on admission at 165. No reported hx of diabetes though patient has not followed with a physician.    Plan:  - HbA1c ordered   - Will monitor blood glucose levels

## 2024-03-14 NOTE — ASSESSMENT & PLAN NOTE
Patient's anemia is currently controlled. Has not received any PRBCs to date. Etiology likely d/t chronic blood loss  Current CBC reviewed-   Lab Results   Component Value Date    HGB 10.1 (L) 03/14/2024    HCT 32.7 (L) 03/14/2024     Monitor serial CBC and transfuse if patient becomes hemodynamically unstable, symptomatic or H/H drops below 7/21.    Plan:  - Iron studies ordered  - Has had vaginal bleeding for over a year, recently dx with uterine adenocarcinoma, obgyn consulted for further recommendatiosn  - holding dvt prophylaxis until CT scans are read to rule out active bleed

## 2024-03-14 NOTE — ASSESSMENT & PLAN NOTE
Elevated LFT's noted on admission, unclear what her baseline is due to no recent labs    Plan:  - Will order liver ultrasound with doppler  - CMP daily to trend LFT's  - Patient not on any medications to cause LFT change

## 2024-03-14 NOTE — ASSESSMENT & PLAN NOTE
Patient with acute kidney injury/acute renal failure likely due to  unknown.  ROBERTO is currently stable. Baseline creatinine unknown - Labs reviewed- Renal function/electrolytes with Estimated Creatinine Clearance: 29.9 mL/min (A) (based on SCr of 1.9 mg/dL (H)). according to latest data. Monitor urine output and serial BMP and adjust therapy as needed. Avoid nephrotoxins and renally dose meds for GFR listed above.    Plan:   Lab Results   Component Value Date    CREATININE 1.9 (H) 03/14/2024     - CMP daily  - Strict I&Os and daily weights   - Gentle IVF  - Avoid nephrotoxic agents such as NSAIDs, gadolinium and IV radiocontrast.  - Renally dose meds to current GFR.  - Maintain MAP > 65.

## 2024-03-15 LAB
ACINETOBACTER CALCOACETICUS/BAUMANNII COMPLEX: NOT DETECTED
ALBUMIN SERPL BCP-MCNC: 2.9 G/DL (ref 3.5–5.2)
ALP SERPL-CCNC: 159 U/L (ref 55–135)
ALT SERPL W/O P-5'-P-CCNC: 56 U/L (ref 10–44)
ANION GAP SERPL CALC-SCNC: 7 MMOL/L (ref 8–16)
AST SERPL-CCNC: 60 U/L (ref 10–40)
BACTEROIDES FRAGILIS: DETECTED
BASOPHILS # BLD AUTO: 0.03 K/UL (ref 0–0.2)
BASOPHILS NFR BLD: 0.2 % (ref 0–1.9)
BILIRUB SERPL-MCNC: 0.7 MG/DL (ref 0.1–1)
BUN SERPL-MCNC: 30 MG/DL (ref 8–23)
CALCIUM SERPL-MCNC: 9.2 MG/DL (ref 8.7–10.5)
CANDIDA ALBICANS: NOT DETECTED
CANDIDA AURIS: NOT DETECTED
CANDIDA GLABRATA: NOT DETECTED
CANDIDA KRUSEI: NOT DETECTED
CANDIDA PARAPSILOSIS: NOT DETECTED
CANDIDA TROPICALIS: NOT DETECTED
CHLORIDE SERPL-SCNC: 107 MMOL/L (ref 95–110)
CHOLEST SERPL-MCNC: 98 MG/DL (ref 120–199)
CHOLEST/HDLC SERPL: 3.5 {RATIO} (ref 2–5)
CO2 SERPL-SCNC: 22 MMOL/L (ref 23–29)
CREAT SERPL-MCNC: 1.6 MG/DL (ref 0.5–1.4)
CRYPTOCOCCUS NEOFORMANS/GATTII: NOT DETECTED
CTX-M GENE (ESBL PRODUCER): ABNORMAL
DIFFERENTIAL METHOD BLD: ABNORMAL
ENTEROBACTER CLOACAE COMPLEX: NOT DETECTED
ENTEROBACTERALES: NOT DETECTED
ENTEROCOCCUS FAECALIS: NOT DETECTED
ENTEROCOCCUS FAECIUM: NOT DETECTED
EOSINOPHIL # BLD AUTO: 0 K/UL (ref 0–0.5)
EOSINOPHIL NFR BLD: 0.1 % (ref 0–8)
ERYTHROCYTE [DISTWIDTH] IN BLOOD BY AUTOMATED COUNT: 14.1 % (ref 11.5–14.5)
ESCHERICHIA COLI: NOT DETECTED
EST. GFR  (NO RACE VARIABLE): 34.9 ML/MIN/1.73 M^2
ESTIMATED AVG GLUCOSE: 117 MG/DL (ref 68–131)
FERRITIN SERPL-MCNC: 113 NG/ML (ref 20–300)
GLUCOSE SERPL-MCNC: 101 MG/DL (ref 70–110)
HAEMOPHILUS INFLUENZAE: NOT DETECTED
HBA1C MFR BLD: 5.7 % (ref 4–5.6)
HCT VFR BLD AUTO: 28.2 % (ref 37–48.5)
HDLC SERPL-MCNC: 28 MG/DL (ref 40–75)
HDLC SERPL: 28.6 % (ref 20–50)
HGB BLD-MCNC: 8.9 G/DL (ref 12–16)
IMM GRANULOCYTES # BLD AUTO: 0.06 K/UL (ref 0–0.04)
IMM GRANULOCYTES NFR BLD AUTO: 0.4 % (ref 0–0.5)
IMP GENE (CARBAPENEM RESISTANT): ABNORMAL
IRON SERPL-MCNC: 15 UG/DL (ref 30–160)
KLEBSIELLA AEROGENES: NOT DETECTED
KLEBSIELLA OXYTOCA: NOT DETECTED
KLEBSIELLA PNEUMONIAE GROUP: NOT DETECTED
KPC RESISTANCE GENE (CARBAPENEM): ABNORMAL
LACTATE SERPL-SCNC: 0.7 MMOL/L (ref 0.5–2.2)
LACTATE SERPL-SCNC: 1 MMOL/L (ref 0.5–2.2)
LDLC SERPL CALC-MCNC: 52.6 MG/DL (ref 63–159)
LISTERIA MONOCYTOGENES: NOT DETECTED
LYMPHOCYTES # BLD AUTO: 1.2 K/UL (ref 1–4.8)
LYMPHOCYTES NFR BLD: 8.9 % (ref 18–48)
MAGNESIUM SERPL-MCNC: 1.8 MG/DL (ref 1.6–2.6)
MCH RBC QN AUTO: 25.9 PG (ref 27–31)
MCHC RBC AUTO-ENTMCNC: 31.6 G/DL (ref 32–36)
MCR-1: ABNORMAL
MCV RBC AUTO: 82 FL (ref 82–98)
MEC A/C AND MREJ (MRSA): ABNORMAL
MEC A/C: ABNORMAL
MONOCYTES # BLD AUTO: 1 K/UL (ref 0.3–1)
MONOCYTES NFR BLD: 7.1 % (ref 4–15)
NDM GENE (CARBAPENEM RESISTANT): ABNORMAL
NEISSERIA MENINGITIDIS: NOT DETECTED
NEUTROPHILS # BLD AUTO: 11.4 K/UL (ref 1.8–7.7)
NEUTROPHILS NFR BLD: 83.3 % (ref 38–73)
NONHDLC SERPL-MCNC: 70 MG/DL
NRBC BLD-RTO: 0 /100 WBC
OXA-48-LIKE (CARBAPENEM RESISTANT): ABNORMAL
PHOSPHATE SERPL-MCNC: 3.8 MG/DL (ref 2.7–4.5)
PLATELET # BLD AUTO: 157 K/UL (ref 150–450)
PMV BLD AUTO: 11.4 FL (ref 9.2–12.9)
POTASSIUM SERPL-SCNC: 4.2 MMOL/L (ref 3.5–5.1)
PROT SERPL-MCNC: 6.9 G/DL (ref 6–8.4)
PROTEUS SPECIES: NOT DETECTED
PSEUDOMONAS AERUGINOSA: NOT DETECTED
RBC # BLD AUTO: 3.44 M/UL (ref 4–5.4)
SALMONELLA SP: NOT DETECTED
SATURATED IRON: 5 % (ref 20–50)
SERRATIA MARCESCENS: NOT DETECTED
SODIUM SERPL-SCNC: 136 MMOL/L (ref 136–145)
STAPHYLOCOCCUS AUREUS: NOT DETECTED
STAPHYLOCOCCUS EPIDERMIDIS: NOT DETECTED
STAPHYLOCOCCUS LUGDUNESIS: NOT DETECTED
STAPHYLOCOCCUS SPECIES: NOT DETECTED
STENOTROPHOMONAS MALTOPHILIA: NOT DETECTED
STREPTOCOCCUS AGALACTIAE: NOT DETECTED
STREPTOCOCCUS PNEUMONIAE: NOT DETECTED
STREPTOCOCCUS PYOGENES: NOT DETECTED
STREPTOCOCCUS SPECIES: NOT DETECTED
TOTAL IRON BINDING CAPACITY: 312 UG/DL (ref 250–450)
TRANSFERRIN SERPL-MCNC: 211 MG/DL (ref 200–375)
TRIGL SERPL-MCNC: 87 MG/DL (ref 30–150)
VAN A/B (VRE GENE): ABNORMAL
VANCOMYCIN SERPL-MCNC: 14.5 UG/ML
VIM GENE (CARBAPENEM RESISTANT): ABNORMAL
WBC # BLD AUTO: 13.72 K/UL (ref 3.9–12.7)

## 2024-03-15 PROCEDURE — 83540 ASSAY OF IRON: CPT

## 2024-03-15 PROCEDURE — 80053 COMPREHEN METABOLIC PANEL: CPT

## 2024-03-15 PROCEDURE — 82728 ASSAY OF FERRITIN: CPT

## 2024-03-15 PROCEDURE — 63600175 PHARM REV CODE 636 W HCPCS: Performed by: HOSPITALIST

## 2024-03-15 PROCEDURE — 21400001 HC TELEMETRY ROOM

## 2024-03-15 PROCEDURE — 80202 ASSAY OF VANCOMYCIN: CPT | Performed by: HOSPITALIST

## 2024-03-15 PROCEDURE — 85025 COMPLETE CBC W/AUTO DIFF WBC: CPT

## 2024-03-15 PROCEDURE — 83735 ASSAY OF MAGNESIUM: CPT

## 2024-03-15 PROCEDURE — 25000003 PHARM REV CODE 250: Performed by: HOSPITALIST

## 2024-03-15 PROCEDURE — 83036 HEMOGLOBIN GLYCOSYLATED A1C: CPT

## 2024-03-15 PROCEDURE — 84100 ASSAY OF PHOSPHORUS: CPT

## 2024-03-15 PROCEDURE — 80061 LIPID PANEL: CPT

## 2024-03-15 PROCEDURE — 25000003 PHARM REV CODE 250

## 2024-03-15 PROCEDURE — 63600175 PHARM REV CODE 636 W HCPCS

## 2024-03-15 PROCEDURE — 83605 ASSAY OF LACTIC ACID: CPT

## 2024-03-15 PROCEDURE — 99221 1ST HOSP IP/OBS SF/LOW 40: CPT | Mod: ,,, | Performed by: OBSTETRICS & GYNECOLOGY

## 2024-03-15 RX ORDER — AMOXICILLIN AND CLAVULANATE POTASSIUM 875; 125 MG/1; MG/1
1 TABLET, FILM COATED ORAL EVERY 12 HOURS
Status: DISCONTINUED | OUTPATIENT
Start: 2024-03-15 | End: 2024-03-15

## 2024-03-15 RX ADMIN — PIPERACILLIN SODIUM AND TAZOBACTAM SODIUM 4.5 G: 4; .5 INJECTION, POWDER, FOR SOLUTION INTRAVENOUS at 09:03

## 2024-03-15 RX ADMIN — PIPERACILLIN SODIUM AND TAZOBACTAM SODIUM 4.5 G: 4; .5 INJECTION, POWDER, FOR SOLUTION INTRAVENOUS at 12:03

## 2024-03-15 RX ADMIN — PIPERACILLIN SODIUM AND TAZOBACTAM SODIUM 4.5 G: 4; .5 INJECTION, POWDER, FOR SOLUTION INTRAVENOUS at 02:03

## 2024-03-15 RX ADMIN — ACETAMINOPHEN 650 MG: 325 TABLET ORAL at 09:03

## 2024-03-15 NOTE — NURSING
Nurses Note -- 4 Eyes      3/15/2024   4:13 PM      Skin assessed during: Admit      [x] No Altered Skin Integrity Present    []Prevention Measures Documented      [] Yes- Altered Skin Integrity Present or Discovered   [] LDA Added if Not in Epic (Describe Wound)   [] New Altered Skin Integrity was Present on Admit and Documented in LDA   [] Wound Image Taken    Wound Care Consulted? No    Attending Nurse:  JAVAD Curry    Second RN/Staff Member:   JAVAD Street

## 2024-03-15 NOTE — ASSESSMENT & PLAN NOTE
Elevated LFT's noted on admission, unclear what her baseline is due to no recent labs    Plan:  - Resolving, likely 2/2 sepsis  - CMP daily to trend LFT's  - Patient not on any medications to cause LFT change

## 2024-03-15 NOTE — ASSESSMENT & PLAN NOTE
This patient does have evidence of infective focus  My overall impression is sepsis.  Source: Unknown  Antibiotics given-   Antibiotics (72h ago, onward)      Start     Stop Route Frequency Ordered    03/15/24 2100  amoxicillin-clavulanate 875-125mg per tablet 1 tablet         03/20/24 2059 Oral Every 12 hours 03/15/24 1333          Latest lactate reviewed-  Recent Labs   Lab 03/14/24  1233 03/14/24  1821 03/15/24  0645   LACTATE  --    < > 0.7   POCLAC 3.13*  --   --     < > = values in this interval not displayed.       Organ dysfunction indicated by Acute kidney injury and Acute liver injury    Fluid challenge Actual Body weight- Patient will receive 30ml/kg actual body weight to calculate fluid bolus for treatment of septic shock.     Post- resuscitation assessment Yes Perfusion exam was performed within 6 hours of septic shock presentation after bolus shows Adequate tissue perfusion assessed by non-invasive monitoring     UA negative, CXR negative, no skin lesions noted, CT abdomen pending. Source unclear at this point.  Plan:  - Blood cultures pending  - Lactic acid resolved  - Started on broad spectrum abx  - will monitor daily cbc and vitals

## 2024-03-15 NOTE — ASSESSMENT & PLAN NOTE
Patient's anemia is currently controlled. Has not received any PRBCs to date. Etiology likely d/t chronic blood loss  Current CBC reviewed-   Lab Results   Component Value Date    HGB 8.9 (L) 03/15/2024    HCT 28.2 (L) 03/15/2024     Monitor serial CBC and transfuse if patient becomes hemodynamically unstable, symptomatic or H/H drops below 7/21.    Plan:  - Iron studies ordered  - Has had vaginal bleeding for over a year, recently dx with uterine adenocarcinoma, obgyn consulted for further recommendatiosn  - holding dvt prophylaxis until CT scans are read to rule out active bleed

## 2024-03-15 NOTE — ASSESSMENT & PLAN NOTE
Patient has hypokalemia which is Acute and currently controlled. Most recent potassium levels reviewed-   Lab Results   Component Value Date    K 4.2 03/15/2024   . Will continue potassium replacement per protocol and recheck repeat levels after replacement completed.

## 2024-03-15 NOTE — PHARMACY MED REC
"    Admission Medication History     The home medication history was taken by Norma Zhong.    You may go to "Admission" then "Reconcile Home Medications" tabs to review and/or act upon these items.     The home medication list has been updated by the Pharmacy department.   Please read ALL comments highlighted in yellow.   Please address this information as you see fit.    Feel free to contact us if you have any questions or require assistance.        Medications listed below were obtained from: Patient/family and Analytic software- [employer]  Current Outpatient Medications on File Prior to Encounter   Medication Sig    acetaminophen (TYLENOL) 500 MG tablet   Take 1,000 mg by mouth every 6 (six) hours as needed for pain.    amLODIPine (NORVASC) 10 MG tablet   Take 10 mg by mouth once daily.    losartan-hydrochlorothiazide 100-25 mg (HYZAAR) 100-25 mg per tablet   Take 1 tablet by mouth once daily.         Potential issues to be addressed PRIOR TO DISCHARGE  Please discuss with the patient barriers to adherence with medication treatment plans    Norma Zhong  EXT [id number]              .          "

## 2024-03-15 NOTE — ED NOTES
Patient identifiers for Cherri Gilmore 68 y.o. female checked and correct.  Chief Complaint   Patient presents with    Fever    Chills    Headache     Past Medical History:   Diagnosis Date    Adenocarcinoma of endometrium     Essential (primary) hypertension      Allergies reported: Review of patient's allergies indicates:  Not on File      LOC: Patient is awake, alert, and aware of environment with an appropriate affect. Patient is oriented x 4 and speaking appropriately.  APPEARANCE: Patient resting comfortably and in no acute distress. Patient is clean and well groomed, patient's clothing is properly fastened.  SKIN: The skin is warm and dry. Patient has normal skin turgor and moist mucus membranes.   MUSKULOSKELETAL: Patient is moving all extremities well, no obvious deformities noted. Pulses intact.   RESPIRATORY: Airway is open and patent. Respirations are spontaneous and non-labored with normal effort and rate.  CARDIAC: Patient has a normal rate and rhythm. Normal sinus on cardiac monitor. No peripheral edema noted.   ABDOMEN: No distention noted. Soft and non-tender upon palpation.  NEUROLOGICAL: PERRL. Facial expression is symmetrical. Hand grasps are equal bilaterally. Normal sensation in all extremities when touched with finger.  : bloody urine noted to suction cannister

## 2024-03-15 NOTE — PROGRESS NOTES
[facility]  Progress Note    Patient Name: Cherri Gilmore  MRN: [id number]  Patient Class: IP- Inpatient   Admission Date: 3/14/2024  Length of Stay: 1 days  Attending Physician: Shellie Murillo*  Primary Care Provider: Winter, Primary Doctor        Subjective:     Principal Problem:Severe sepsis        HPI:  68 year old F with PMH of post menopausal bleeding, recent pathology report noting focally necrotic high grade endometrial adenocarcinoma, presenting for fever of 103.1 and chills. Reports that yesterday started developing fever and chills and posterior head pain which she feels was a result of the rigors. Head pain has since resolved. Pt denies chest pain, sob, abdominal pain, n/v/d, constipation, fever, chills, or weakness of the extremities. Discussed cancer diagnosis with patient and stressed importance of follow up with GYN. Admitted to  for septic shock.    On presentation to [facility], the patient was alert, oriented, no complaints.  VS Normotensive, Febrile with temperature of 103.1, and tachycardic at 145 .  Initial labs notable for hgb 10.1, k 3.4, bun/cr of 24/1.9, tbili 1.2,  ALT 78, . CXR clear, pending CT head and abdomen. Urinalysis does not appear infectious. Therapy/stabilization measures were started, notable for Ceftriaxone and 2.5L fluid bolus.      Overview/Hospital Course:  No notes on file    Past Medical History:   Diagnosis Date    Adenocarcinoma of endometrium     Essential (primary) hypertension        History reviewed. No pertinent surgical history.    Review of patient's allergies indicates:  Not on File    No current facility-administered medications on file prior to encounter.     No current outpatient medications on file prior to encounter.     Family History    None       Tobacco Use    Smoking status: Never    Smokeless tobacco: Never   Substance and Sexual Activity    Alcohol use: Never    Drug use: Never    Sexual activity: Not  Currently     Review of Systems   Constitutional:  Negative for activity change, appetite change, chills, fatigue and fever.   Eyes:  Negative for photophobia and visual disturbance.   Respiratory:  Negative for cough, chest tightness, shortness of breath and wheezing.    Cardiovascular:  Negative for chest pain.   Gastrointestinal:  Negative for abdominal distention, abdominal pain, blood in stool, constipation, diarrhea, nausea and vomiting.   Genitourinary:  Positive for vaginal bleeding. Negative for difficulty urinating and dysuria.   Musculoskeletal:  Negative for gait problem.   Skin:  Negative for color change.   Neurological:  Negative for dizziness, weakness, light-headedness and headaches.   Psychiatric/Behavioral:  Negative for agitation, behavioral problems and confusion.      Objective:     Vital Signs (Most Recent):  Temp: 98.9 °F (37.2 °C) (03/14/24 1822)  Pulse: 104 (03/14/24 1822)  Resp: 18 (03/14/24 1822)  BP: 124/64 (03/14/24 1822)  SpO2: 98 % (03/14/24 1822) Vital Signs (24h Range):  Temp:  [98.5 °F (36.9 °C)-103.4 °F (39.7 °C)] 98.9 °F (37.2 °C)  Pulse:  [100-145] 104  Resp:  [16-20] 18  SpO2:  [96 %-100 %] 98 %  BP: (100-127)/(59-64) 124/64     Weight: 81.6 kg (180 lb)  Body mass index is 29.95 kg/m².     Physical Exam  Vitals and nursing note reviewed.   Constitutional:       General: She is not in acute distress.     Appearance: Normal appearance. She is not ill-appearing.   HENT:      Head: Normocephalic and atraumatic.      Right Ear: External ear normal.      Left Ear: External ear normal.      Nose: Nose normal.   Eyes:      General: No scleral icterus.     Conjunctiva/sclera: Conjunctivae normal.      Pupils: Pupils are equal, round, and reactive to light.   Cardiovascular:      Rate and Rhythm: Normal rate and regular rhythm.   Pulmonary:      Effort: Pulmonary effort is normal. No respiratory distress.   Abdominal:      General: Abdomen is flat. There is no distension.    Musculoskeletal:         General: Normal range of motion.      Cervical back: Normal range of motion.      Right lower leg: No edema.      Left lower leg: No edema.   Skin:     Coloration: Skin is not jaundiced.      Findings: No bruising.   Neurological:      Mental Status: She is alert and oriented to person, place, and time. Mental status is at baseline.              CRANIAL NERVES     CN III, IV, VI   Pupils are equal, round, and reactive to light.       Significant Labs: All pertinent labs within the past 24 hours have been reviewed.    Significant Imaging: I have reviewed all pertinent imaging results/findings within the past 24 hours.    Assessment/Plan:      * Severe sepsis  This patient does have evidence of infective focus  My overall impression is sepsis.  Source: Unknown  Antibiotics given-   Antibiotics (72h ago, onward)      Start     Stop Route Frequency Ordered    03/15/24 2100  amoxicillin-clavulanate 875-125mg per tablet 1 tablet         03/20/24 2059 Oral Every 12 hours 03/15/24 1333          Latest lactate reviewed-  Recent Labs   Lab 03/14/24  1233 03/14/24  1821 03/15/24  0645   LACTATE  --    < > 0.7   POCLAC 3.13*  --   --     < > = values in this interval not displayed.       Organ dysfunction indicated by Acute kidney injury and Acute liver injury    Fluid challenge Actual Body weight- Patient will receive 30ml/kg actual body weight to calculate fluid bolus for treatment of septic shock.     Post- resuscitation assessment Yes Perfusion exam was performed within 6 hours of septic shock presentation after bolus shows Adequate tissue perfusion assessed by non-invasive monitoring     UA negative, CXR negative, no skin lesions noted, CT abdomen pending. Source unclear at this point.  Plan:  - Blood cultures pending  - Lactic acid resolved  - Started on broad spectrum abx  - will monitor daily cbc and vitals    Hyperglycemia  Elevated on admission at 165. No reported hx of diabetes though  patient has not followed with a physician.    Plan:  - HbA1c ordered   - Will monitor blood glucose levels      Transaminitis  Elevated LFT's noted on admission, unclear what her baseline is due to no recent labs    Plan:  - Resolving, likely 2/2 sepsis  - CMP daily to trend LFT's  - Patient not on any medications to cause LFT change    ROBERTO (acute kidney injury)  Patient with acute kidney injury/acute renal failure likely due to  unknown.  ROBERTO is currently stable. Baseline creatinine unknown - Labs reviewed- Renal function/electrolytes with Estimated Creatinine Clearance: 35.5 mL/min (A) (based on SCr of 1.6 mg/dL (H)). according to latest data. Monitor urine output and serial BMP and adjust therapy as needed. Avoid nephrotoxins and renally dose meds for GFR listed above.    Plan:   Lab Results   Component Value Date    CREATININE 1.6 (H) 03/15/2024     - CMP daily  - Strict I&Os and daily weights   - Gentle IVF  - Avoid nephrotoxic agents such as NSAIDs, gadolinium and IV radiocontrast.  - Renally dose meds to current GFR.  - Maintain MAP > 65.      Metabolic acidosis, normal anion gap (NAG)  Low CO2 noted on labs of 21. Will continue to monitor with daily cmps      Hypokalemia  Patient has hypokalemia which is Acute and currently controlled. Most recent potassium levels reviewed-   Lab Results   Component Value Date    K 4.2 03/15/2024   . Will continue potassium replacement per protocol and recheck repeat levels after replacement completed.     Normocytic anemia due to blood loss  Patient's anemia is currently controlled. Has not received any PRBCs to date. Etiology likely d/t chronic blood loss  Current CBC reviewed-   Lab Results   Component Value Date    HGB 8.9 (L) 03/15/2024    HCT 28.2 (L) 03/15/2024     Monitor serial CBC and transfuse if patient becomes hemodynamically unstable, symptomatic or H/H drops below 7/21.    Plan:  - Iron studies ordered  - Has had vaginal bleeding for over a year, recently dx  with uterine adenocarcinoma, obgyn consulted for further recommendatiosn  - holding dvt prophylaxis until CT scans are read to rule out active bleed    Endometrial adenocarcinoma  The endometrial biopsy reveals extensive replacement of normal endometrial tissue by a high-grade/poorly differentiated adenocarcinoma with pleomorphic nuclear features in some of the areas examined. Mitotic activity is brisk (up to 6 mitoses in a single high power field). There are scattered areas of necrosis throughout the tumor. Per OBGYN note from today was trying to reach patient to discuss test results, patient was not able to answer the phone.     Plan:  - GYN onc consulted, not recommending inpatient intervention  - CT chest w contrast ordered per gyn to stage tumor  - Will FU outpatient for intervention        VTE Risk Mitigation (From admission, onward)           Ordered     IP VTE HIGH RISK PATIENT  Once         03/14/24 1807     Place sequential compression device  Until discontinued         03/14/24 1807     Reason for No Pharmacological VTE Prophylaxis  Once        Question:  Reasons:  Answer:  Active Bleeding    03/14/24 1807                    Discharge Planning   LINDA: 3/19/2024     Code Status: Full Code   Is the patient medically ready for discharge?: No    Reason for patient still in hospital (select all that apply): Patient trending condition                     Zbigniew Jim MD  Department of Hospital Medicine   [facility]

## 2024-03-15 NOTE — ASSESSMENT & PLAN NOTE
The endometrial biopsy reveals extensive replacement of normal endometrial tissue by a high-grade/poorly differentiated adenocarcinoma with pleomorphic nuclear features in some of the areas examined. Mitotic activity is brisk (up to 6 mitoses in a single high power field). There are scattered areas of necrosis throughout the tumor. Per OBGYN note from today was trying to reach patient to discuss test results, patient was not able to answer the phone.     Plan:  - GYN onc consulted, not recommending inpatient intervention  - CT chest w contrast ordered per gyn to stage tumor  - Will FU outpatient for intervention

## 2024-03-15 NOTE — CONSULTS
"[facility] - Emergency Dept  Gynecologic Oncology  Consult Note    Patient Name: Cherri Gilmore  MRN: [id number]  Admission Date: 3/14/2024  Hospital Length of Stay: 1 days  Attending Provider: Shellie Murillo*  Primary Care Provider: Winter, Primary Doctor  Principal Problem: Severe sepsis     Inpatient consult to Gynecologic Oncology  Consult performed by: Charisse Jorge MD  Consult ordered by: Matilde Castandea MD        Subjective:     Chief Complaint/Reason for Admission: sepsis    History of Present Illness:   Cherri Gilmore is 68 y.o.  who presented to the ED with fevers and headache on 3/14. She is admitted to hospital medicine for septic shock and was started on vanc/zosyn. Infectious work up pending.     Gyn Onc is consulted, as patient recently has new diagnosis of endometrial cancer. Patient reported PMB and had EMBx on 3/7 at OSH. Biopsy was consistent with high grade endometrial adenocarcinoma, FIGO grade 3. No features suggestive of serous morphology, although mixed mullerian tumor could not be excluded. Tumor positive for "vimentin, pankeratin, WT1 (predominantly cytoplasmic positivity), PAX8, and p16. There is patchy strong positivity for p63. Estrogen receptor positivity is 1-2%. Although there is strong positivity for p53, the pattern is mixed and appears to be non mutated. The Ki-67 proliferation index is markedly increased (focally up to 80%). There is negative staining for progesterone receptor, CEA-M, CD10, and Napsin A."    Today patient reports vaginal spotting. The spotting began one year ago and has been persistent, but light. She has been postmenopausal since age 50. Denies abdominal pain and all other symptoms.    PSH: LACEY Vargas    Oncology Treatment Plan:   [No matching plan found]    Oncology History: see pathology per HPI.    Past Medical History:   Diagnosis Date    Adenocarcinoma of endometrium     Essential (primary) hypertension      History reviewed. No pertinent surgical " [id number] [facility]  OCCUPATIONAL THERAPY  [] OLDER CHILD EVALUATION  [x] DAILY NOTE (LAND) [] DAILY NOTE (AQUATIC ) [] PROGRESS NOTE [] DISCHARGE NOTE    Date: 22  Patient Name:  Kulwinder Mcnamara  Parent Name: Jessy Valle  : 2015 Age: 9 y.o. 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]    Treatment Diagnosis F82: fine motor delay   Date of Evaluation 21   Last Scheduled OT Visit 22      Functional Outcome Measure Used M-FUN   Functional Outcome Score  visual motor percentile 1%, fine motor percentile 1% (21)       Insurance: Primary: Payor: [facility] /  /  / ,   Secondary: [address] [place] [address] Information: [address] [facility] required   Visit # , 3/10   Visits Allowed: RECEIVED AUTH FOR OT  TOTAL OF 26 VISITS  FROM 10/18/22 TO 23  CPT CODES:  22869, 05783  ORDER # [id number]   Recertification Date:     Pertinent History: Pt hx of torticollis, received services through [facility] as a baby. Parent reports pt demonstrating a fear of loud noises since she was an infant. Allergies/Medications: Allergies and Medications have been reviewed and are listed on the Medical History Questionnaire. Living Situation: Kulwinder Mcnamara lives with Mother, Father and Siblings   Birth History: Father reports the pt was born fullterm. Patient was hospitalized for ~3 days due to poor nutritional intake. Equipment Utilized: none   Other Services Received: evaluation for PT   Caregiver Concerns: Handwriting, fine motor skills (scissor skills, fasteners, tying shoes). Parent report concerns for defiant behavior. Dad reports pt requires a lot of positive reinforcement to complete the majority of tasks, reports concerns for decreased motivation and poor attention to follow directions.  Parents report at school history.  Family History    None       Tobacco Use    Smoking status: Never    Smokeless tobacco: Never   Substance and Sexual Activity    Alcohol use: Never    Drug use: Never    Sexual activity: Not Currently       (Not in a hospital admission)      Review of patient's allergies indicates:  Not on File    Review of Systems   Constitutional:  Negative for chills and fever.   Respiratory:  Negative for cough and shortness of breath.    Cardiovascular:  Negative for chest pain and palpitations.   Gastrointestinal:  Negative for abdominal pain, constipation, diarrhea, nausea and vomiting.   Genitourinary:  Negative for dysuria, urgency, vaginal bleeding, vaginal discharge and vaginal pain.   Musculoskeletal:  Negative for arthralgias.   Integumentary:  Negative for rash.   Neurological:  Negative for syncope and headaches.      Objective:     Vital Signs (Most Recent):  Temp: 98.7 °F (37.1 °C) (03/15/24 0856)  Pulse: 86 (03/15/24 0856)  Resp: 15 (03/14/24 2156)  BP: 100/62 (03/15/24 0856)  SpO2: 98 % (03/15/24 0856) Vital Signs (24h Range):  Temp:  [97.6 °F (36.4 °C)-103.4 °F (39.7 °C)] 98.7 °F (37.1 °C)  Pulse:  [] 86  Resp:  [15-20] 15  SpO2:  [96 %-100 %] 98 %  BP: (100-127)/(55-74) 100/62     Weight: 81.6 kg (180 lb)  Body mass index is 29.95 kg/m².    Physical Exam:   Constitutional: She is oriented to person, place, and time. She appears well-developed and well-nourished. No distress.    HENT:   Head: Normocephalic and atraumatic.    Eyes: EOM are normal.     Cardiovascular:  Normal rate.             Pulmonary/Chest: Effort normal. No respiratory distress.        Abdominal: Soft.     Genitourinary:    Vagina, uterus, right adnexa and left adnexa normal.   There is no rash, tenderness or lesion on the right labia. There is no rash, tenderness or lesion on the left labia. Cervix is normal. Right adnexum displays no mass, no tenderness and no fullness. Left adnexum displays no mass, no tenderness and no  the pt has limited interaction and participation in play with peers, pt prefers to play alone. Dad reports pt appears to have a fear of heights, will climb FDC up a slide and then get scared. Parent reports pt has demonstrated distress (crying, avoiding, upset) around loud noises including dad's powertools, automatic toilets, loud environment such as assembly's at school, and opening up a trash bag. Precautions: standard   Pain: Chronic low back pain, no report of pain this session     SUBJECTIVE: Alla Hay presented to OT session with mom who waited in the waiting room. Alla Hay was pleasant requiring moderate cues to participate in therapist directed activity this session. During session Alla Hay reporting \"I don't want to\". OBJECTIVE:                                            GOALS:  Patient/Family Goal: improve fine motor skills, attention      SHORT-TERM GOALS:   Short-term Goal Timeframe: 2 months - 12/17/22   NEW GOAL #1. Parent will report pt tolerating 1 non-preferred food on her plate during mealtime with min cues and no behaviors. INTERVENTION: Goal not addressed this date       #2. Pt will copy 3/4 words on three lined paper with 75% accuracy for letter alignment and letter formation. INTERVENTION: Goal not directly addressed this date      #3. Parents will report increased pt participation in morning routine following visual resources (timer/pecs) and sensory strategies provided min cues to transition between activities 75% of the time. INTERVENTION: Discussed morning schedule with Dad and Alla Hay. Dad reports schedule is helping, Alla Hay got dressed by herself 2 mornings. Dad requesting to add take vitamins to schedule. NEW GOAL #4. Pt will demonstrate improved social skills and self regulation to take turns and follow directions appropriately during an age appropriate game for 10 minutes.     INTERVENTION: Discussed emotions and zones this date, including things that upset Alla Hay and make Nino happy. Discussed ways to self calm while upset including deep breaths and yoga, pt demonstrates understanding of both. NEW GOAL #5. Parent will report increased tolerance to loud sounds including toilet automatic flushing and fire alarm. Went to automatic flushing toilet in hospital this date, Nichol Lewis reports she is going to toilet at school. Dad reports Nichol Lewis sits on toilet side ways and uses hand to cover sensor. Nino max flushes toilet this time with no sign of distress and washes hands indep    LONG-TERM GOALS:   Long-term Goal Timeframe: 1 year   #1. Pt will complete dressing independently including all fasteners on clothing 90% of the time. GOAL NOT MET. CONTINUE. INTERVENTION: not directly addressed this visit      #2. Pt will demonstrate appropriate self regulation using adaptations and calming strategies with min cues for improved participation in activities in a busy/loud environment at school or home. GOAL NOT MET, CONTINUE        Patient Education:   [x]  HEP/Education Completed: positive meal times and food chaining  []  No new Education completed  [x]  Reviewed Prior HEP      [x]  Patient/Caregiver verbalized and/or demonstrated understanding of education provided. []  Patient/Caregiver unable to verbalize and/or demonstrate understanding of education provided. Will continue education. [x]  Barriers to learning: N/A    ASSESSMENT:  Activity/Treatment Tolerance:  []  Patient tolerated treatment well  []  Patient limited by fatigue  []  Patient limited by pain   []  Patient limited by medical complications  [x]  Other: participated fairly well, inconsistent participation with some avoidance behaviors     Assessment: Pt is making slow progress towards her goals.    Body Structures/Functions/Activity Limitations: decreased core strength, sensory processing difficulties, fine motor, visual motor delays  Prognosis: good provided caregiver support    PLAN:  Treatment Recommendations: Parent fullness. No vaginal discharge or tenderness in the vagina. Cervix exhibits no motion tenderness, no lesion, no friability and no tenderness. Uterus is not tender.    Genitourinary Comments: Minimal bleeding noted in vaginal vault. No obvious cervical abnormalities. Benign BME.             Musculoskeletal: Normal range of motion.       Neurological: She is alert and oriented to person, place, and time.    Skin: Skin is warm and dry. She is not diaphoretic.    Psychiatric: She has a normal mood and affect.       Laboratory:  Recent Lab Results  (Last 5 results in the past 24 hours)        03/15/24  0645   03/15/24  0452   03/14/24  2201   03/14/24  1821   03/14/24  1541        Influenza A, Molecular               Influenza B, Molecular               Albumin   2.9             ALP   159             ALT   56             Anion Gap   7             Appearance, UA         Clear       AST   60             Baso #   0.03             Basophil %   0.2             Bilirubin (UA)         Negative       BILIRUBIN TOTAL   0.7  Comment: For infants and newborns, interpretation of results should be based  on gestational age, weight and in agreement with clinical  observations.    Premature Infant recommended reference ranges:  Up to 24 hours.............<8.0 mg/dL  Up to 48 hours............<12.0 mg/dL  3-5 days..................<15.0 mg/dL  6-29 days.................<15.0 mg/dL               BUN   30             Calcium   9.2             Chloride   107             Cholesterol Total   98  Comment: The National Cholesterol Education Program (NCEP) has set the  following guidelines (reference ranges) for Cholesterol:  Optimal.....................<200 mg/dL  Borderline High.............200-239 mg/dL  High........................> or = 240 mg/dL               CO2   22             Color, UA         Yellow       Creatinine   1.6             Differential Method   Automated             eGFR   34.9             Eos #   0.0             Eos %    0.1             Estimated Avg Glucose   117             Ferritin   113             Flu A & B Source               Glucose   101             Glucose, UA         Negative       Gran # (ANC)   11.4             Gran %   83.3             HDL   28  Comment: The National Cholesterol Education Program (NCEP) has set the  following guidelines (reference values) for HDL Cholesterol:  Low...............<40 mg/dL  Optimal...........>60 mg/dL               HDL/Cholesterol Ratio   28.6             Hematocrit   28.2             Hemoglobin   8.9             Hemoglobin A1C External   5.7  Comment: ADA Screening Guidelines:  5.7-6.4%  Consistent with prediabetes  >or=6.5%  Consistent with diabetes    High levels of fetal hemoglobin interfere with the HbA1C  assay. Heterozygous hemoglobin variants (HbS, HgC, etc)do  not significantly interfere with this assay.   However, presence of multiple variants may affect accuracy.               Immature Grans (Abs)   0.06  Comment: Mild elevation in immature granulocytes is non specific and   can be seen in a variety of conditions including stress response,   acute inflammation, trauma and pregnancy. Correlation with other   laboratory and clinical findings is essential.               Immature Granulocytes   0.4             Iron   15             Ketones, UA         Negative       Lactic Acid Level 0.7  Comment: Falsely low lactic acid results can be found in samples   containing >=13.0 mg/dL total bilirubin and/or >=3.5 mg/dL   direct bilirubin.     1.0  Comment: Falsely low lactic acid results can be found in samples   containing >=13.0 mg/dL total bilirubin and/or >=3.5 mg/dL   direct bilirubin.     2.1  Comment: Falsely low lactic acid results can be found in samples   containing >=13.0 mg/dL total bilirubin and/or >=3.5 mg/dL   direct bilirubin.     1.7  Comment: Falsely low lactic acid results can be found in samples   containing >=13.0 mg/dL total bilirubin and/or >=3.5 mg/dL   direct  Education and Training, Fine motor play activities targeting grasp pattern, Play activities targeting social skills, Play activities targeting visual motor skills, Multi-sensory intervention, Dressing skills, Handwriting, Core strengthening for upper extremity stability, Play activities targeting attention and Use of visual supports    []  Plan of care initiated. Plan to see patient 1 times per week for 8 weeks to address the treatment planned outlined above. [x]  Continue with current plan of care  []  Modify plan of care as follows:    []  Hold pending physician visit  []  Discharge    Time In 1600   Time Out 1630   Timed Code Minutes: 30 min   Total Treatment Time: 30 min     Electronically Signed by:  PAULINO Sanders/JUDY   License: [id number]  Occupational Therapist  [address] bil[address].           LDL Cholesterol   52.6  Comment: The National Cholesterol Education Program (NCEP) has set the  following guidelines (reference values) for LDL Cholesterol:  Optimal.......................<130 mg/dL  Borderline High...............130-159 mg/dL  High..........................160-189 mg/dL  Very High.....................>190 mg/dL               Leukocyte Esterase, UA         Negative       Lymph #   1.2             Lymph %   8.9             Magnesium    1.8             MCH   25.9             MCHC   31.6             MCV   82             Mono #   1.0             Mono %   7.1             MPV   11.4             NITRITE UA         Negative       Non-HDL Cholesterol   70  Comment: Risk category and Non-HDL cholesterol goals:  Coronary heart disease (CHD)or equivalent (10-year risk of CHD >20%):  Non-HDL cholesterol goal     <130 mg/dL  Two or more CHD risk factors and 10-year risk of CHD <= 20%:  Non-HDL cholesterol goal     <160 mg/dL  0 to 1 CHD risk factor:  Non-HDL cholesterol goal     <190 mg/dL               nRBC   0             Blood, UA         Trace       pH, UA         6.0       Phosphorus Level   3.8             Platelet Count   157             Potassium   4.2             PROTEIN TOTAL   6.9             Protein, UA         Trace  Comment: Recommend a 24 hour urine protein or a urine   protein/creatinine ratio if globulin induced proteinuria is  clinically suspected.         RBC   3.44             RDW   14.1             SARS-CoV-2 RNA, Amplification, Qual               Saturated Iron   5             Sodium   136             Specific Gravity, UA         1.020       Specimen UA         Urine, Clean Catch       TIBC   312             Total Cholesterol/HDL Ratio   3.5             Transferrin   211             Triglycerides   87  Comment: The National Cholesterol Education Program (NCEP) has set the  following guidelines (reference values) for triglycerides:  Normal......................<150  mg/dL  Borderline High.............150-199 mg/dL  High........................200-499 mg/dL               WBC   13.72                                    Diagnostic Results:  Imaging Results              US Retroperitoneal Complete (Final result)  Result time 03/14/24 22:13:48      Final result by Susu Garcia MD (03/14/24 22:13:48)                   Impression:      Elevated bilateral renal indices.  Findings may suggest medical renal disease.      Electronically signed by: Susu Garcia  Date:    03/14/2024  Time:    22:13               Narrative:    EXAMINATION:  ULTRASOUND RETROPERITONEAL COMPLETE    CLINICAL HISTORY:  elaine;    TECHNIQUE:  Real-time ultrasound of the retroperitoneum was performed, including the urinary bladder and kidneys.    COMPARISON:  CT abdomen pelvis 03/14/2024    FINDINGS:  Right kidney measures 12.2 cm.  The resistive index is 0.82.  There is no hydronephrosis.    Left kidney measures 11.7.  The resistive index is 0.79.  There is no hydronephrosis.    The bladder is within normal limits.                                       US Liver with Doppler (xpd) (Final result)  Result time 03/15/24 00:55:23      Final result by Bradley Moctezuma MD (03/15/24 00:55:23)                   Impression:      Satisfactory Doppler evaluation of the liver.    Mild hepatosplenomegaly with splenic collaterals.    Prominence of the common bile duct, which can be seen post cholecystectomy.    Additional findings within the body of the report.    Electronically signed by resident: Virginia Cui  Date:    03/15/2024  Time:    00:39    Electronically signed by: Bradley Moctezuma  Date:    03/15/2024  Time:    00:55               Narrative:    EXAMINATION:  US LIVER WITH DOPPLER    CLINICAL HISTORY:  transaminitis;    TECHNIQUE:  Complete abdominal ultrasound with doppler.  Color and spectral Doppler were performed.    COMPARISON:  CT 02/14/2023    FINDINGS:  Pancreas: The visualized portion of the  pancreas is unremarkable.    Liver: Enlarged measuring 19.3 cm.  The liver demonstrates homogeneous echotexture.  HRI measures 0.8.  No focal hepatic lesions are seen.    Biliary system: Gallbladder has been surgically removed.  The common duct prominent measuring 8 mm, which can be seen with cholecystectomy.  No dilated intrahepatic radicles are seen.    The spleen is mildly enlarged in size measuring 13.5 x 3.2 cm with a homogeneous echotexture.    The aorta tapers normally.    The kidneys are normal in size without focal abnormality or evidence of hydronephrosis.    There is no evidence of ascites.    The main portal vein, right portal vein, left portal vein, middle hepatic vein, right hepatic vein, left hepatic vein, SMV, and IVC are patent with proper directional flow.  Main portal vein reaches peak systolic velocity of 38.6 cm/sec, upper limits of normal.  The main hepatic artery is patent. The umbilical vein is not patent.                                        CT Abdomen Pelvis  Without Contrast (Final result)  Result time 03/14/24 18:37:53      Final result by Susu Garcia MD (03/14/24 18:37:53)                   Impression:      Mild hepatosplenomegaly.  Mild hepatic steatosis.    Hepatic steatosis.    Uterine leiomyomas.  Associated mass effect on the urinary bladder.    Small hiatal hernia.    4 mm right middle lobe pulmonary nodule.  For a solid nodule <6 mm, Fleischner Society 2017 guidelines recommend no routine follow up for a low risk patient, or follow-up with non-contrast chest CT at 12 months in a high risk patient.    This report was flagged in Epic as abnormal.      Electronically signed by: Susu Garcia  Date:    03/14/2024  Time:    18:37               Narrative:    EXAMINATION:  CT ABDOMEN PELVIS WITHOUT    CLINICAL HISTORY:  Fever, chills, and headache.  Urinary frequency without dysuria.  Mild vaginal spotting.    TECHNIQUE:  5 mm unenhanced axial images from the lung bases  through the greater trochanters were performed.  Coronal and sagittal reformatted images were provided.    COMPARISON:  None.    FINDINGS:  Within the limits of a noncontrast examination, there is mild hepatic steatosis.  There is mild hepatosplenomegaly.    Pancreas, kidneys, and adrenal glands are unremarkable.  The gallbladder is surgically absent.    There is no gross abdominal adenopathy or ascites.    The uterus is lobular and moderately enlarged.  It exerts mass effect on the urinary bladder.  There is no free pelvic fluid.  The visualized appendix is not inflamed.    At the lung bases, there is mild bibasilar atelectatic change.  A 4 mm nodule is seen in the right middle lobe (series 2 axial image 24).  There is mild pleural thickening.  Calcified mediastinal and left hilar lymph nodes are present.    There is a small hiatal hernia.                                       CT Head Without Contrast (Final result)  Result time 03/14/24 18:16:59      Final result by Bradley Moctezuma MD (03/14/24 18:16:59)                   Impression:      No acute abnormality.      Electronically signed by: Bradley Moctezuma  Date:    03/14/2024  Time:    18:16               Narrative:    EXAMINATION:  CT HEAD WITHOUT CONTRAST    CLINICAL HISTORY:  Headache, new or worsening (Age >= 50y);    TECHNIQUE:  Low dose axial CT images obtained throughout the head without intravenous contrast. Sagittal and coronal reconstructions were performed.    COMPARISON:  None.    FINDINGS:  Intracranial compartment:    Ventricles and sulci are normal in size for age without evidence of hydrocephalus. No extra-axial blood or fluid collections.    The brain parenchyma appears normal. No parenchymal mass, hemorrhage, edema or major vascular distribution infarct.    Skull/extracranial contents (limited evaluation): No fracture. Mastoid air cells and paranasal sinuses are essentially clear.  Right parietal osteoma                                        X-Ray Chest AP Portable (Final result)  Result time 03/14/24 13:24:00      Final result by Sherman Glass Jr., MD (03/14/24 13:24:00)                   Impression:      No convincing abnormality identified.  Probable atelectasis left base.      Electronically signed by: Sherman Glass MD  Date:    03/14/2024  Time:    13:24               Narrative:    EXAMINATION:  XR CHEST AP PORTABLE    CLINICAL HISTORY:  Sepsis;    TECHNIQUE:  Single frontal view of the chest was performed.    COMPARISON:  None    FINDINGS:  Monitoring leads in place.  Heart size pulmonary vessels are normal.  Lungs are fairly well aerated.  Few subtle markings left lung base likely atelectasis.  No pneumothorax.                                      Assessment/Plan:     Active Diagnoses:    Diagnosis Date Noted POA    PRINCIPAL PROBLEM:  Severe sepsis [A41.9, R65.20] 03/14/2024 Yes    Endometrial adenocarcinoma [C54.1] 03/14/2024 Yes    Normocytic anemia due to blood loss [D50.0] 03/14/2024 Yes    Hypokalemia [E87.6] 03/14/2024 Yes    Metabolic acidosis, normal anion gap (NAG) [E87.20] 03/14/2024 Yes    ROBERTO (acute kidney injury) [N17.9] 03/14/2024 Yes    Transaminitis [R74.01] 03/14/2024 Yes    Hyperglycemia [R73.9] 03/14/2024 Yes      Problems Resolved During this Admission:     1) Sepsis  - management per primary team    2) Endometrial Cancer  - Pathology reviewed, consistent with grade 3 endometrial adenocarcinoma  - CT Abdomen/Pelvis without evidence of metastatic disease, would recommend CT Chest with contrast for full staging  - If no evidence of distant disease on CT Chest, patient would be candidate for RA-TLH/BSO/SLND for staging and treatment of endometrial cancer. Patient may need additional post-operative vaginal brachytherapy vs chemotherapy pending final pathology and staging.  - Surgery does NOT need to be done as inpatient. Patient has established GYN care at outside facility and can continue to follow up there. Should  patient desire to transfer care to [facility], would be happy to coordinate follow up in our Gyn Onc clinic as outpatient.    Thank you for your consult. I will sign off. Please contact us if you have any additional questions.    Charisse Jorge MD  Gynecologic Oncology  [facility] - Emergency Dept

## 2024-03-15 NOTE — MEDICAL/APP STUDENT
[facility]  In-Patient Hospital Progress Note  (Medical Student Note)    DATE   03/15/2024 2:18 PM    PATIENT INFORMATION   Name: Ms.Lucia Gilmore  Medical Record Number: [id number]   YOB: 1955  Age: 68 y.o.  Length of Hospital Stay: 1  Code Status: FULL    HPI:   Ms.Lucia Gilmore is a 68 y.o. female with recent diagnosis of focally necrotic high grade endometrial adenocarcinoma, with PMH of HTN. She presents to the ER for one day fever of 103.1 and chills. Patient denies abdominal pain, chest pain, difficulty breathing, SOB, diarrhea, constipation, weakness, urinary symptoms, or skin injuries. Admitted for septic shock.    On presentation to [facility], the patient was alert, oriented, no complaints. VS Normotensive, Febrile with temperature of 103.1, and tachycardic at 145 . Initial labs notable for hgb 10.1, k 3.4, bun/cr of 24/1.9, tbili 1.2,  ALT 78, . CXR clear, pending CT head and abdomen. Urinalysis does not appear infectious     Chief Complaint: Severe sepsis    SUBJECTIVE:   Ms.Lucia Gilmore is a 68 y.o. female who  has a past medical history of Adenocarcinoma of endometrium and Essential (primary) hypertension..     Patient is currently hospitalized due to Severe sepsis.    This morning, patient reports feeling good, denies SOB, chest pain, coughing, abdominal pain, urinary symptoms. Reports gynecologic bleeding, no change from usual, one episode of diarrhea, no nausea, no vomiting. Denies fever or chills     Voiding: Yes   Bowel Movements: Yes   Drinking: Yes   Eating: Yes   Has appetite to eat: Yes   Bed to chair, chair to bed: Yes   Ambulating: Yes     Allergies:  Review of patient's allergies indicates:  Not on File    Current Medications:  Current Facility-Administered Medications   Medication Dose Route Frequency Provider Last Rate Last Admin    acetaminophen tablet 650 mg  650 mg Oral Q8H PRN Zbigniew Jim MD        amoxicillin-clavulanate 875-125mg per tablet 1  tablet  1 tablet Oral Q12H Shellie Murillo MD        dextrose 10% bolus 125 mL 125 mL  12.5 g Intravenous PRZbigniew Umanzor MD        dextrose 10% bolus 250 mL 250 mL  25 g Intravenous PRN Zbigniew Jim MD        glucagon (human recombinant) injection 1 mg  1 mg Intramuscular PRZbigniew Umanzor MD        glucose chewable tablet 16 g  16 g Oral PRZbigniew Umanzor MD        glucose chewable tablet 24 g  24 g Oral PRN Zbigniew Jim MD        melatonin tablet 6 mg  6 mg Oral Nightly PRN Zbigniew Jim MD        naloxone 0.4 mg/mL injection 0.02 mg  0.02 mg Intravenous PRZbigniew Umanzor MD        sodium chloride 0.9% flush 10 mL  10 mL Intravenous Q12H PRZbigniew Umanzor MD         Current Outpatient Medications   Medication Sig Dispense Refill    acetaminophen (TYLENOL) 500 MG tablet Take 1,000 mg by mouth every 6 (six) hours as needed for Pain.      amLODIPine (NORVASC) 10 MG tablet Take 10 mg by mouth once daily.      losartan-hydrochlorothiazide 100-25 mg (HYZAAR) 100-25 mg per tablet Take 1 tablet by mouth once daily.         Reviewed past medical and surgical history.  Active Ambulatory Problems     Diagnosis Date Noted    No Active Ambulatory Problems     Resolved Ambulatory Problems     Diagnosis Date Noted    No Resolved Ambulatory Problems     Past Medical History:   Diagnosis Date    Adenocarcinoma of endometrium     Essential (primary) hypertension         She has no past surgical history on file.    Reviewed family history.   Her family history is not on file.     Reviewed social history.   Social History     Socioeconomic History    Marital status: Single   Tobacco Use    Smoking status: Never    Smokeless tobacco: Never   Substance and Sexual Activity    Alcohol use: Never    Drug use: Never    Sexual activity: Not Currently        Review of Systems:  Constitutional:  Negative for activity change, appetite change, chills, fatigue and fever.   Eyes:  Negative for photophobia  "and visual disturbance.   Respiratory:  Negative for cough, chest tightness, shortness of breath and wheezing.    Cardiovascular:  Negative for chest pain.   Gastrointestinal:  Negative for abdominal distention, abdominal pain, blood in stool, constipation, diarrhea, nausea and vomiting.   Genitourinary:  Positive for vaginal bleeding. Negative for difficulty urinating and dysuria.   Musculoskeletal:  Negative for gait problem.   Skin:  Negative for color change.   Neurological:  Negative for dizziness, weakness, light-headedness and headaches.   Psychiatric/Behavioral:  Negative for agitation, behavioral problems and confusion.     OBJECTIVE:   Vital Signs (Most Recent)  /70   Pulse 86   Temp 98.9 °F (37.2 °C) (Oral)   Resp 19   Ht 5' 5" (1.651 m)   Wt 81.6 kg (180 lb)   SpO2 99%   BMI 29.95 kg/m²     Vital Signs (over last 24 hours)  Temp:  [97.6 °F (36.4 °C)-99.3 °F (37.4 °C)]   Pulse:  []   Resp:  [15-20]   BP: (100-124)/(55-74)   SpO2:  [96 %-100 %]   Temp (24hrs), Av.6 °F (37 °C), Min:97.6 °F (36.4 °C), Max:99.3 °F (37.4 °C)      Body Habitus:  5' 5" (1.651 m)  81.6 kg (180 lb)   Body mass index is 29.95 kg/m².    Oxygen Status: RA    Physical Exam:  Constitutional:       General: She is not in acute distress.     Appearance: Normal appearance. She is not ill-appearing.   HENT:      Head: Normocephalic and atraumatic.      Right Ear: External ear normal.      Left Ear: External ear normal.      Nose: Nose normal.   Eyes:      General: No scleral icterus.     Conjunctiva/sclera: Conjunctivae normal.      Pupils: Pupils are equal, round, and reactive to light.   Cardiovascular:      Rate and Rhythm: Normal rate and regular rhythm.   Pulmonary:      Effort: Pulmonary effort is normal. No respiratory distress.   Abdominal:      General: Abdomen is flat. There is no distension.   Musculoskeletal:         General: Normal range of motion.      Cervical back: Normal range of motion.      Right " lower leg: No edema.      Left lower leg: No edema.   Skin:     Coloration: Skin is not jaundiced.      Findings: No bruising.   Neurological:      Mental Status: She is alert and oriented to person, place, and time. Mental status is at baseline.    Laboratory Results:  Recent Labs   Lab 03/15/24  0452   WBC 13.72*   HGB 8.9*   HCT 28.2*   MCV 82      ANIONGAP 7*      K 4.2   MG 1.8      CO2 22*   BUN 30*   CREATININE 1.6*   CALCIUM 9.2   ALT 56*   AST 60*   ALKPHOS 159*   BILITOT 0.7   PROT 6.9   ALBUMIN 2.9*        Hemoglobin A1c:  Hemoglobin A1C   Date Value Ref Range Status   03/15/2024 5.7 (H) 4.0 - 5.6 % Final     Comment:     ADA Screening Guidelines:  5.7-6.4%  Consistent with prediabetes  >or=6.5%  Consistent with diabetes    High levels of fetal hemoglobin interfere with the HbA1C  assay. Heterozygous hemoglobin variants (HbS, HgC, etc)do  not significantly interfere with this assay.   However, presence of multiple variants may affect accuracy.           Imaging/Pathology Results:  US Retroperitoneal Complete (Final result)  Result time 03/14/24 22:13:48            Final result by Susu Garcia MD (03/14/24 22:13:48)                           Impression:        Elevated bilateral renal indices.  Findings may suggest medical renal disease.        Electronically signed by:     Susu Garcia  Date:                                            03/14/2024  Time:                                            22:13                     Narrative:     EXAMINATION:  ULTRASOUND RETROPERITONEAL COMPLETE     CLINICAL HISTORY:  elaine;     TECHNIQUE:  Real-time ultrasound of the retroperitoneum was performed, including the urinary bladder and kidneys.     COMPARISON:  CT abdomen pelvis 03/14/2024     FINDINGS:  Right kidney measures 12.2 cm.  The resistive index is 0.82.  There is no hydronephrosis.     Left kidney measures 11.7.  The resistive index is 0.79.  There is no hydronephrosis.     The  bladder is within normal limits.                                                US Liver with Doppler (xpd) (Final result)  Result time 03/15/24 00:55:23            Final result by Bradley Moctezuma MD (03/15/24 00:55:23)                           Impression:        Satisfactory Doppler evaluation of the liver.     Mild hepatosplenomegaly with splenic collaterals.     Prominence of the common bile duct, which can be seen post cholecystectomy.     Additional findings within the body of the report.     Electronically signed by resident: Virginia Cui  Date:                                            03/15/2024  Time:                                            00:39     Electronically signed by:     Bradley Moctezuma  Date:                                            03/15/2024  Time:                                            00:55                     Narrative:     EXAMINATION:  US LIVER WITH DOPPLER     CLINICAL HISTORY:  transaminitis;     TECHNIQUE:  Complete abdominal ultrasound with doppler.  Color and spectral Doppler were performed.     COMPARISON:  CT 02/14/2023     FINDINGS:  Pancreas: The visualized portion of the pancreas is unremarkable.     Liver: Enlarged measuring 19.3 cm.  The liver demonstrates homogeneous echotexture.  HRI measures 0.8.  No focal hepatic lesions are seen.     Biliary system: Gallbladder has been surgically removed.  The common duct prominent measuring 8 mm, which can be seen with cholecystectomy.  No dilated intrahepatic radicles are seen.     The spleen is mildly enlarged in size measuring 13.5 x 3.2 cm with a homogeneous echotexture.     The aorta tapers normally.     The kidneys are normal in size without focal abnormality or evidence of hydronephrosis.     There is no evidence of ascites.     The main portal vein, right portal vein, left portal vein, middle hepatic vein, right hepatic vein, left hepatic vein, SMV, and IVC are patent with proper directional flow.  Main portal  vein reaches peak systolic velocity of 38.6 cm/sec, upper limits of normal.  The main hepatic artery is patent. The umbilical vein is not patent.                                                 CT Abdomen Pelvis  Without Contrast (Final result)  Result time 03/14/24 18:37:53            Final result by Susu Garcia MD (03/14/24 18:37:53)                           Impression:        Mild hepatosplenomegaly.  Mild hepatic steatosis.     Hepatic steatosis.     Uterine leiomyomas.  Associated mass effect on the urinary bladder.     Small hiatal hernia.     4 mm right middle lobe pulmonary nodule.  For a solid nodule <6 mm, Fleischner Society 2017 guidelines recommend no routine follow up for a low risk patient, or follow-up with non-contrast chest CT at 12 months in a high risk patient.     This report was flagged in Epic as abnormal.        Electronically signed by:     Susu Garcia  Date:                                            03/14/2024  Time:                                            18:37                     Narrative:     EXAMINATION:  CT ABDOMEN PELVIS WITHOUT     CLINICAL HISTORY:  Fever, chills, and headache.  Urinary frequency without dysuria.  Mild vaginal spotting.     TECHNIQUE:  5 mm unenhanced axial images from the lung bases through the greater trochanters were performed.  Coronal and sagittal reformatted images were provided.     COMPARISON:  None.     FINDINGS:  Within the limits of a noncontrast examination, there is mild hepatic steatosis.  There is mild hepatosplenomegaly.     Pancreas, kidneys, and adrenal glands are unremarkable.  The gallbladder is surgically absent.     There is no gross abdominal adenopathy or ascites.     The uterus is lobular and moderately enlarged.  It exerts mass effect on the urinary bladder.  There is no free pelvic fluid.  The visualized appendix is not inflamed.     At the lung bases, there is mild bibasilar atelectatic change.  A 4 mm nodule is seen in  the right middle lobe (series 2 axial image 24).  There is mild pleural thickening.  Calcified mediastinal and left hilar lymph nodes are present.     There is a small hiatal hernia.                                                CT Head Without Contrast (Final result)  Result time 03/14/24 18:16:59            Final result by Bradley Moctezuma MD (03/14/24 18:16:59)                           Impression:        No acute abnormality.        Electronically signed by:     Bradley Moctezuma  Date:                                            03/14/2024  Time:                                            18:16                     Narrative:     EXAMINATION:  CT HEAD WITHOUT CONTRAST     CLINICAL HISTORY:  Headache, new or worsening (Age >= 50y);     TECHNIQUE:  Low dose axial CT images obtained throughout the head without intravenous contrast. Sagittal and coronal reconstructions were performed.     COMPARISON:  None.     FINDINGS:  Intracranial compartment:     Ventricles and sulci are normal in size for age without evidence of hydrocephalus. No extra-axial blood or fluid collections.     The brain parenchyma appears normal. No parenchymal mass, hemorrhage, edema or major vascular distribution infarct.     Skull/extracranial contents (limited evaluation): No fracture. Mastoid air cells and paranasal sinuses are essentially clear.  Right parietal osteoma                                                X-Ray Chest AP Portable (Final result)  Result time 03/14/24 13:24:00            Final result by Sherman Glass Jr., MD (03/14/24 13:24:00)                           Impression:        No convincing abnormality identified.  Probable atelectasis left base.        Electronically signed by:     Sherman Glass MD  Date:                                            03/14/2024  Time:                                            13:24                     Narrative:     EXAMINATION:  XR CHEST AP PORTABLE     CLINICAL HISTORY:  Sepsis;      TECHNIQUE:  Single frontal view of the chest was performed.     COMPARISON:  None     FINDINGS:  Monitoring leads in place.  Heart size pulmonary vessels are normal.  Lungs are fairly well aerated.  Few subtle markings left lung base likely atelectasis.  No pneumothorax.                   ASSESSMENT and PLAN:   Problem List:  Patient Active Problem List    Diagnosis Date Noted    Endometrial adenocarcinoma 03/14/2024    Severe sepsis 03/14/2024    Normocytic anemia due to blood loss 03/14/2024    Hypokalemia 03/14/2024    Metabolic acidosis, normal anion gap (NAG) 03/14/2024    ROBERTO (acute kidney injury) 03/14/2024    Transaminitis 03/14/2024    Hyperglycemia 03/14/2024        Assessment:   - Ms.Lucia Gilmore is 68 y.o. female is hospitalized due Severe sepsis.  - Patient's expectations are in accordance with the plan for management.     Plan:  1. Severe sepsis  Patient came in with sepsis criteria.  Patient has not presented fever, tachycardia, BP within ranges  She has an elevated WBC, creatinine and LFT with descending trend, lactic acid now within normal ranges.  UA negative, CXR negative, no skin lesions noted  Patient remains asymptomatic with no clear infective focus and adequate clinical evolution.    Plan:  - Change AB treatment to Augmentin, suspend vanco/sozin  - Blood cultures pending, reevaluate tomorrow with 48h reports  - Monitor daily CBC and vitals    2. ROBERTO  Patient with acute kidney injury of unknown source. Baseline creatinine unknown, last serum creatinine of 1.6 down from 1.9 with eGFR of 34.9.    Plan:  - CMP daily  - Strict I&Os and daily weights   - Avoid nephrotoxic agents such as NSAIDs, gadolinium and IV radiocontrast.   - Renally dose meds to current GFR.     3. Transaminitis  Elevated LFTs on admission, not clear baseline.  CT abdomen with hepatic steatosis, liver US with doppler reported mild hepatosplenomegaly with splenic collaterals, prominence of the common bile duct, which can be  seen post cholecystectomy.    Plan:   - Trend LTFs daily  - Avoid hepatotoxic drugs    4. Normocytic anemia due to blood loss  Patient with normocytic anemia on admission, with history of vaginal bleeding.  Las Hb of 8.9 down from 10.1 likely from dilution and fluid redistribution due to fluid administration.  Patient candidate for surgical intervention of endometrial adenocarcinoma outpatient  Patient denies lightheadedness, denies additional source of blood loss    Plan:  - Daily CBC  - Transfuse if patient becomes hemodynamically unstable, symptomatic or H/H drops below 7/21   - Ob/gyn indicates candidate to surgical procedure to be done outpatient    5. Hyperglycemia  Serum glucose of 165 on admission, no history of diabetes.  This morning's glucose is 101, HbA1c of 5.7  Patient in prediabetes ranges, does not require additional treatment    Plan:  - Monitor blood glucose levels    6. Metabolic acidosis, normal anion gap  Low CO2 on admin of 21, last one of 22.     Plan:  - Daily CMPs    7. Hypokalemia  Hypokalemia on admin of 3.4.  Had potassium replacement with repeated levels of 4.2 after.  No need for additional potassium replacement    Plan  - Daily CMPs  - Stop potassium replacement    8. Endometrial adenocarcinoma  Recent diagnosis of ocally necrotic high grade endometrial adenocarcinoma. Seen by OBGYN today who recommend chest CT for full staging. Patient candidate for surgery that does not need to be done inpatient.  Patient desires to transfer care to [facility] with our Gyn Onc clinic as outpatient    Plan:  - Chest CT for full staging  - Transfer care to Gyn Onc at [facility], follow outpatient        Discharge Plan:   - Upon discharge, Ms.Lucia Gilmore should follow-up with Gyn Onc at [facility] in the future on an as-needed basis.    Patient Education:  - Diet:  Patient has been advised to follow a diet emphasizing vegetables, fruits, whole grains, low-fat dairy products, fish, legumes and nuts. Patient has  been advised to limit sodium, sweets and red meat.   - Exercise: Patient has been advised that regular physical activity reduces health risk, positively impacts risk factors and aids in recovery. Patients who are able should engage in mild-to-moderate-intensity aerobic for a minimum of 20 minutes 3-4 times a week. For patients with deficits that impair their ability to exercise, a supervised exercise program can be beneficial.  - Smoking: Patient has been advised that counseling with or without drug therapy is available to help quit smoking.  - Sleep Apnea: Patient has been advised that a referral for testing is available, and that treatment with positive airway pressure may be beneficial.  - [place] Physician Orders for Scope of Treatment (LaPOST): A LaPOST is a document that details a patient's goals of care and treatment preferences. The document should be completed after a thorough discussion with your Primary Care Provider and family members. Completion of a LaPOST document encourages communication between doctors and a patient, enables a patient to make informed decisions, and clearly documents these decisions for future healthcare providers. A LaPOST document can help ensure that a patient's wishes are honored and reduce patient and family stress regarding decision-making.     I spent 60 minutes on this visit, including face-to-face with the patient, chart review, and updating medical, surgical, social, and family history.  All questions were directed to the medical team and answered.     --  Joao Klein    Medical Student

## 2024-03-15 NOTE — ASSESSMENT & PLAN NOTE
Patient with acute kidney injury/acute renal failure likely due to  unknown.  ROBERTO is currently stable. Baseline creatinine unknown - Labs reviewed- Renal function/electrolytes with Estimated Creatinine Clearance: 35.5 mL/min (A) (based on SCr of 1.6 mg/dL (H)). according to latest data. Monitor urine output and serial BMP and adjust therapy as needed. Avoid nephrotoxins and renally dose meds for GFR listed above.    Plan:   Lab Results   Component Value Date    CREATININE 1.6 (H) 03/15/2024     - CMP daily  - Strict I&Os and daily weights   - Gentle IVF  - Avoid nephrotoxic agents such as NSAIDs, gadolinium and IV radiocontrast.  - Renally dose meds to current GFR.  - Maintain MAP > 65.

## 2024-03-16 PROBLEM — R91.8 PULMONARY NODULES: Status: ACTIVE | Noted: 2024-03-16

## 2024-03-16 LAB
ALBUMIN SERPL BCP-MCNC: 2.9 G/DL (ref 3.5–5.2)
ALP SERPL-CCNC: 121 U/L (ref 55–135)
ALT SERPL W/O P-5'-P-CCNC: 33 U/L (ref 10–44)
ANION GAP SERPL CALC-SCNC: 8 MMOL/L (ref 8–16)
AST SERPL-CCNC: 24 U/L (ref 10–40)
BASOPHILS # BLD AUTO: 0.04 K/UL (ref 0–0.2)
BASOPHILS NFR BLD: 0.3 % (ref 0–1.9)
BILIRUB SERPL-MCNC: 0.5 MG/DL (ref 0.1–1)
BUN SERPL-MCNC: 25 MG/DL (ref 8–23)
CALCIUM SERPL-MCNC: 9.2 MG/DL (ref 8.7–10.5)
CHLORIDE SERPL-SCNC: 106 MMOL/L (ref 95–110)
CO2 SERPL-SCNC: 22 MMOL/L (ref 23–29)
CREAT SERPL-MCNC: 1.3 MG/DL (ref 0.5–1.4)
DIFFERENTIAL METHOD BLD: ABNORMAL
EOSINOPHIL # BLD AUTO: 0.3 K/UL (ref 0–0.5)
EOSINOPHIL NFR BLD: 2.6 % (ref 0–8)
ERYTHROCYTE [DISTWIDTH] IN BLOOD BY AUTOMATED COUNT: 14.1 % (ref 11.5–14.5)
EST. GFR  (NO RACE VARIABLE): 44.8 ML/MIN/1.73 M^2
GLUCOSE SERPL-MCNC: 90 MG/DL (ref 70–110)
HCT VFR BLD AUTO: 29 % (ref 37–48.5)
HGB BLD-MCNC: 8.9 G/DL (ref 12–16)
IMM GRANULOCYTES # BLD AUTO: 0.05 K/UL (ref 0–0.04)
IMM GRANULOCYTES NFR BLD AUTO: 0.4 % (ref 0–0.5)
LYMPHOCYTES # BLD AUTO: 1.5 K/UL (ref 1–4.8)
LYMPHOCYTES NFR BLD: 13.2 % (ref 18–48)
MAGNESIUM SERPL-MCNC: 2 MG/DL (ref 1.6–2.6)
MCH RBC QN AUTO: 25.5 PG (ref 27–31)
MCHC RBC AUTO-ENTMCNC: 30.7 G/DL (ref 32–36)
MCV RBC AUTO: 83 FL (ref 82–98)
MONOCYTES # BLD AUTO: 0.7 K/UL (ref 0.3–1)
MONOCYTES NFR BLD: 6 % (ref 4–15)
NEUTROPHILS # BLD AUTO: 8.9 K/UL (ref 1.8–7.7)
NEUTROPHILS NFR BLD: 77.5 % (ref 38–73)
NRBC BLD-RTO: 0 /100 WBC
PHOSPHATE SERPL-MCNC: 3 MG/DL (ref 2.7–4.5)
PLATELET # BLD AUTO: 162 K/UL (ref 150–450)
PMV BLD AUTO: 11.6 FL (ref 9.2–12.9)
POTASSIUM SERPL-SCNC: 4 MMOL/L (ref 3.5–5.1)
PROT SERPL-MCNC: 6.7 G/DL (ref 6–8.4)
RBC # BLD AUTO: 3.49 M/UL (ref 4–5.4)
SODIUM SERPL-SCNC: 136 MMOL/L (ref 136–145)
WBC # BLD AUTO: 11.55 K/UL (ref 3.9–12.7)

## 2024-03-16 PROCEDURE — 63600175 PHARM REV CODE 636 W HCPCS: Performed by: HOSPITALIST

## 2024-03-16 PROCEDURE — 80053 COMPREHEN METABOLIC PANEL: CPT

## 2024-03-16 PROCEDURE — 25000003 PHARM REV CODE 250

## 2024-03-16 PROCEDURE — 21400001 HC TELEMETRY ROOM

## 2024-03-16 PROCEDURE — 85025 COMPLETE CBC W/AUTO DIFF WBC: CPT

## 2024-03-16 PROCEDURE — 36415 COLL VENOUS BLD VENIPUNCTURE: CPT

## 2024-03-16 PROCEDURE — 83735 ASSAY OF MAGNESIUM: CPT

## 2024-03-16 PROCEDURE — 25500020 PHARM REV CODE 255: Performed by: HOSPITALIST

## 2024-03-16 PROCEDURE — 84100 ASSAY OF PHOSPHORUS: CPT

## 2024-03-16 PROCEDURE — 25000003 PHARM REV CODE 250: Performed by: HOSPITALIST

## 2024-03-16 PROCEDURE — 94761 N-INVAS EAR/PLS OXIMETRY MLT: CPT

## 2024-03-16 RX ORDER — TALC
6 POWDER (GRAM) TOPICAL NIGHTLY
Status: DISCONTINUED | OUTPATIENT
Start: 2024-03-16 | End: 2024-03-19 | Stop reason: HOSPADM

## 2024-03-16 RX ADMIN — ACETAMINOPHEN 650 MG: 325 TABLET ORAL at 06:03

## 2024-03-16 RX ADMIN — PIPERACILLIN SODIUM AND TAZOBACTAM SODIUM 4.5 G: 4; .5 INJECTION, POWDER, FOR SOLUTION INTRAVENOUS at 11:03

## 2024-03-16 RX ADMIN — PIPERACILLIN SODIUM AND TAZOBACTAM SODIUM 4.5 G: 4; .5 INJECTION, POWDER, FOR SOLUTION INTRAVENOUS at 09:03

## 2024-03-16 RX ADMIN — PIPERACILLIN SODIUM AND TAZOBACTAM SODIUM 4.5 G: 4; .5 INJECTION, POWDER, FOR SOLUTION INTRAVENOUS at 05:03

## 2024-03-16 RX ADMIN — IOHEXOL 75 ML: 350 INJECTION, SOLUTION INTRAVENOUS at 05:03

## 2024-03-16 RX ADMIN — Medication 6 MG: at 09:03

## 2024-03-16 NOTE — ASSESSMENT & PLAN NOTE
Elevated on admission at 165. No reported hx of diabetes though patient has not followed with a physician.    Plan:  - HbA1c ordered - not diabetic  - Will monitor blood glucose levels

## 2024-03-16 NOTE — PROGRESS NOTES
[facility] Medicine  Progress Note    Patient Name: Cherri Gilmore  MRN: [id number]  Patient Class: IP- Inpatient   Admission Date: 3/14/2024  Length of Stay: 2 days  Attending Physician: Shellie Murillo*  Primary Care Provider: Winter, Primary Doctor        Subjective:     Principal Problem:Severe sepsis        HPI:  68 year old F with PMH of post menopausal bleeding, recent pathology report noting focally necrotic high grade endometrial adenocarcinoma, presenting for fever of 103.1 and chills. Reports that yesterday started developing fever and chills and posterior head pain which she feels was a result of the rigors. Head pain has since resolved. Pt denies chest pain, sob, abdominal pain, n/v/d, constipation, fever, chills, or weakness of the extremities. Discussed cancer diagnosis with patient and stressed importance of follow up with GYN. Admitted to  for septic shock.    On presentation to [facility], the patient was alert, oriented, no complaints.  VS Normotensive, Febrile with temperature of 103.1, and tachycardic at 145 .  Initial labs notable for hgb 10.1, k 3.4, bun/cr of 24/1.9, tbili 1.2,  ALT 78, . CXR clear, pending CT head and abdomen. Urinalysis does not appear infectious. Therapy/stabilization measures were started, notable for Ceftriaxone and 2.5L fluid bolus.      Overview/Hospital Course:  Started on broad spectrum abx, GNR's growing on blood cultures. Rapid ID as Bacteriodes, possibly from tumor necrosis. Will change abx to metronidazole on DC. Will refer patient to [facility] on discharge.     Interval History: NAEON. Blood cultures growing bacterioids        Vital Signs (Most Recent):  Temp: 98.1 °F (36.7 °C) (03/16/24 1038)  Pulse: 77 (03/16/24 1038)  Resp: 18 (03/16/24 1038)  BP: (!) 112/59 (03/16/24 1038)  SpO2: 99 % (03/16/24 1038) Vital Signs (24h Range):  Temp:  [97.5 °F (36.4 °C)-98.4 °F (36.9 °C)] 98.1 °F (36.7 °C)  Pulse:  [71-91] 77  Resp:  [18]  18  SpO2:  [97 %-99 %] 99 %  BP: ()/(59-70) 112/59     Weight: 81.6 kg (180 lb)  Body mass index is 29.95 kg/m².    Intake/Output Summary (Last 24 hours) at 3/16/2024 1241  Last data filed at 3/16/2024 0500  Gross per 24 hour   Intake --   Output 550 ml   Net -550 ml         Physical Exam  Vitals and nursing note reviewed.   Constitutional:       General: She is not in acute distress.     Appearance: Normal appearance. She is not ill-appearing.   HENT:      Head: Normocephalic and atraumatic.      Right Ear: External ear normal.      Left Ear: External ear normal.      Nose: Nose normal.   Eyes:      General: No scleral icterus.     Conjunctiva/sclera: Conjunctivae normal.      Pupils: Pupils are equal, round, and reactive to light.   Cardiovascular:      Rate and Rhythm: Normal rate and regular rhythm.   Pulmonary:      Effort: Pulmonary effort is normal. No respiratory distress.   Abdominal:      General: Abdomen is flat. There is no distension.   Musculoskeletal:         General: Normal range of motion.      Cervical back: Normal range of motion.      Right lower leg: No edema.      Left lower leg: No edema.   Skin:     Coloration: Skin is not jaundiced.      Findings: No bruising.   Neurological:      Mental Status: She is alert and oriented to person, place, and time. Mental status is at baseline.             Significant Labs: All pertinent labs within the past 24 hours have been reviewed.    Significant Imaging: I have reviewed all pertinent imaging results/findings within the past 24 hours.    Assessment/Plan:      * Severe sepsis  This patient does have evidence of infective focus  My overall impression is sepsis.  Source: Unknown  Antibiotics given-   Antibiotics (72h ago, onward)      Start     Stop Route Frequency Ordered    03/15/24 2000  piperacillin-tazobactam (ZOSYN) 4.5 g in dextrose 5 % in water (D5W) 100 mL IVPB (MB+)         -- IV Every 8 hours (non-standard times) 03/15/24 8781           Latest lactate reviewed-  Recent Labs   Lab 03/14/24  1233 03/14/24  1821 03/15/24  0645   LACTATE  --    < > 0.7   POCLAC 3.13*  --   --     < > = values in this interval not displayed.       Organ dysfunction indicated by Acute kidney injury and Acute liver injury    Fluid challenge Actual Body weight- Patient will receive 30ml/kg actual body weight to calculate fluid bolus for treatment of septic shock.     Post- resuscitation assessment Yes Perfusion exam was performed within 6 hours of septic shock presentation after bolus shows Adequate tissue perfusion assessed by non-invasive monitoring     UA negative, CXR negative, no skin lesions noted, CT abdomen pending. Source unclear at this point.  Plan:  - Blood cultures growing Bacteroides, possibly from uterine adenocarcinoma tumor necrosis   - Will continue broad spec abx, likely switch to Flagyl tomorrow to continue outpatient   - Lactic acid resolved    Pulmonary nodules  Solid pulmonary micro nodules measuring up to 3 mm.  Follow-up to be determined by oncology.       Hyperglycemia  Elevated on admission at 165. No reported hx of diabetes though patient has not followed with a physician.    Plan:  - HbA1c ordered - not diabetic  - Will monitor blood glucose levels      Transaminitis  Elevated LFT's noted on admission, unclear what her baseline is due to no recent labs    Plan:  - Resolving, likely 2/2 sepsis  - CMP daily to trend LFT's  - Patient not on any medications to cause LFT change    ROBERTO (acute kidney injury)  Patient with acute kidney injury/acute renal failure likely due to  unknown.  ROBERTO is currently stable. Baseline creatinine unknown - Labs reviewed- Renal function/electrolytes with Estimated Creatinine Clearance: 43.7 mL/min (based on SCr of 1.3 mg/dL). according to latest data. Monitor urine output and serial BMP and adjust therapy as needed. Avoid nephrotoxins and renally dose meds for GFR listed above.    Plan:   Lab Results   Component  Value Date    CREATININE 1.3 03/16/2024     resolving      Metabolic acidosis, normal anion gap (NAG)  Low CO2 noted on labs of 21. Will continue to monitor with daily cmps      Hypokalemia  Patient has hypokalemia which is Acute and currently controlled. Most recent potassium levels reviewed-   Lab Results   Component Value Date    K 4.0 03/16/2024   . Will continue potassium replacement per protocol and recheck repeat levels after replacement completed.     Normocytic anemia due to blood loss  Patient's anemia is currently controlled. Has not received any PRBCs to date. Etiology likely d/t chronic blood loss  Current CBC reviewed-   Lab Results   Component Value Date    HGB 8.9 (L) 03/16/2024    HCT 29.0 (L) 03/16/2024     Monitor serial CBC and transfuse if patient becomes hemodynamically unstable, symptomatic or H/H drops below 7/21.    Plan:  - Iron studies ordered  - Has had vaginal bleeding for over a year, recently dx with uterine adenocarcinoma, obgyn consulted for further recommendatiosn  - holding dvt prophylaxis until CT scans are read to rule out active bleed    Endometrial adenocarcinoma  The endometrial biopsy reveals extensive replacement of normal endometrial tissue by a high-grade/poorly differentiated adenocarcinoma with pleomorphic nuclear features in some of the areas examined. Mitotic activity is brisk (up to 6 mitoses in a single high power field). There are scattered areas of necrosis throughout the tumor. Per OBGYN note from today was trying to reach patient to discuss test results, patient was not able to answer the phone.     Plan:  - GYN onc consulted, not recommending inpatient intervention  - CT chest w contrast ordered per gyn to stage tumor - no evidence of metastasis   - Will FU outpatient for intervention        VTE Risk Mitigation (From admission, onward)           Ordered     IP VTE HIGH RISK PATIENT  Once         03/14/24 1807     Place sequential compression device  Until  discontinued         03/14/24 1807     Reason for No Pharmacological VTE Prophylaxis  Once        Question:  Reasons:  Answer:  Active Bleeding    03/14/24 1807                    Discharge Planning   LINDA: 3/17/2024     Code Status: Full Code   Is the patient medically ready for discharge?: No    Reason for patient still in hospital (select all that apply): Patient trending condition                     Zbigniew Jim MD  Department of Hospital Medicine   [facility]

## 2024-03-16 NOTE — MEDICAL/APP STUDENT
[facility] MEDIC[facility]  In-Patient Hospital Progress Note  (Medical Student Note)     DATE   03/15/2024 2:18 PM     PATIENT INFORMATION   Name: Ms.Lucia Gilmore  Medical Record Number: [id number]   YOB: 1955  Age: 68 y.o.  Length of Hospital Stay: 1  Code Status: FULL     HPI:   Ms.Lucia Gilmore is a 68 y.o. female with recent diagnosis of focally necrotic high grade endometrial adenocarcinoma, with PMH of HTN. She presents to the ER for one day fever of 103.1 and chills. Patient denies abdominal pain, chest pain, difficulty breathing, SOB, diarrhea, constipation, weakness, urinary symptoms, or skin injuries. Admitted for septic shock.     On presentation to [facility] [place], the patient was alert, oriented, no complaints. VS Normotensive, Febrile with temperature of 103.1, and tachycardic at 145 . Initial labs notable for hgb 10.1, k 3.4, bun/cr of 24/1.9, tbili 1.2,  ALT 78, . CXR clear, pending CT head and abdomen. Urinalysis does not appear infectious      Chief Complaint: Severe sepsis     SUBJECTIVE:   Ms.Lucia Gilmore is a 68 y.o. female who  has a past medical history of Adenocarcinoma of endometrium and Essential (primary) hypertension..      Patient is currently hospitalized due to Severe sepsis.     This morning, patient reports feeling good, denies SOB, chest pain, coughing, abdominal pain, urinary symptoms. Reports gynecologic bleeding, no change from usual, no diarrhea, no nausea, no vomiting. Denies fever or chills      Voiding: Yes   Bowel Movements: Yes   Drinking: Yes   Eating: Yes   Has appetite to eat: Yes   Bed to chair, chair to bed: Yes   Ambulating: Yes      Allergies:  Review of patient's allergies indicates:  Not on File     Current Medications:  Current Medications             Current Facility-Administered Medications   Medication Dose Route Frequency Provider Last Rate Last Admin    acetaminophen tablet 650 mg  650 mg Oral Q8H PRN Zbigniew Jim MD         amoxicillin-clavulanate 875-125mg per tablet 1 tablet  1 tablet Oral Q12H Shellie Murillo MD        dextrose 10% bolus 125 mL 125 mL  12.5 g Intravenous PRZbigniew Umanzor MD        dextrose 10% bolus 250 mL 250 mL  25 g Intravenous PRN Zbigniew Jim MD        glucagon (human recombinant) injection 1 mg  1 mg Intramuscular PRZbigniew Umanzor MD        glucose chewable tablet 16 g  16 g Oral PRZbigniew Umanzor MD        glucose chewable tablet 24 g  24 g Oral PRN Zbigniew Jim MD        melatonin tablet 6 mg  6 mg Oral Nightly PRN Zbigniew Jim MD        naloxone 0.4 mg/mL injection 0.02 mg  0.02 mg Intravenous PRZbigniew Umanzor MD        sodium chloride 0.9% flush 10 mL  10 mL Intravenous Q12H PRZbigniew Umanzor MD                 Current Outpatient Medications   Medication Sig Dispense Refill    acetaminophen (TYLENOL) 500 MG tablet Take 1,000 mg by mouth every 6 (six) hours as needed for Pain.        amLODIPine (NORVASC) 10 MG tablet Take 10 mg by mouth once daily.        losartan-hydrochlorothiazide 100-25 mg (HYZAAR) 100-25 mg per tablet Take 1 tablet by mouth once daily.                Reviewed past medical and surgical history.       Active Ambulatory Problems     Diagnosis Date Noted    No Active Ambulatory Problems           Resolved Ambulatory Problems     Diagnosis Date Noted    No Resolved Ambulatory Problems           Past Medical History:   Diagnosis Date    Adenocarcinoma of endometrium      Essential (primary) hypertension           She has no past surgical history on file.     Reviewed family history.   Her family history is not on file.      Reviewed social history.   Social History           Socioeconomic History    Marital status: Single   Tobacco Use    Smoking status: Never    Smokeless tobacco: Never   Substance and Sexual Activity    Alcohol use: Never    Drug use: Never    Sexual activity: Not Currently         Review of Systems:  Constitutional:  Negative for  "activity change, appetite change, chills, fatigue and fever.   Eyes:  Negative for photophobia and visual disturbance.   Respiratory:  Negative for cough, chest tightness, shortness of breath and wheezing.    Cardiovascular:  Negative for chest pain.   Gastrointestinal:  Negative for abdominal distention, abdominal pain, blood in stool, constipation, diarrhea, nausea and vomiting.   Genitourinary:  Positive for vaginal bleeding. Negative for difficulty urinating and dysuria.   Musculoskeletal:  Negative for gait problem.   Skin:  Negative for color change.   Neurological:  Negative for dizziness, weakness, light-headedness and headaches.   Psychiatric/Behavioral:  Negative for agitation, behavioral problems and confusion.      OBJECTIVE:   Vital Signs (Most Recent)  Vitals:    03/16/24 1038   BP: (!) 112/59   Pulse: 77   Resp: 18   Temp: 98.1 °F (36.7 °C)         Body Habitus:  5' 5" (1.651 m)  81.6 kg (180 lb)   Body mass index is 29.95 kg/m².     Oxygen Status: RA     Physical Exam:  Constitutional:       General: She is not in acute distress.     Appearance: Normal appearance. She is not ill-appearing.   HENT:      Head: Normocephalic and atraumatic.      Right Ear: External ear normal.      Left Ear: External ear normal.      Nose: Nose normal.   Eyes:      General: No scleral icterus.     Conjunctiva/sclera: Conjunctivae normal.      Pupils: Pupils are equal, round, and reactive to light.   Cardiovascular:      Rate and Rhythm: Normal rate and regular rhythm.   Pulmonary:      Effort: Pulmonary effort is normal. No respiratory distress.   Abdominal:      General: Abdomen is flat. There is no distension.   Musculoskeletal:         General: Normal range of motion.      Cervical back: Normal range of motion.      Right lower leg: No edema.      Left lower leg: No edema.   Skin:     Coloration: Skin is not jaundiced.      Findings: No bruising.   Neurological:      Mental Status: She is alert and oriented to " person, place, and time. Mental status is at baseline.       Hemoglobin A1c:          Hemoglobin A1C   Date Value Ref Range Status   03/15/2024 5.7 (H) 4.0 - 5.6 % Final       Comment:       ADA Screening Guidelines:  5.7-6.4%  Consistent with prediabetes  >or=6.5%  Consistent with diabetes     High levels of fetal hemoglobin interfere with the HbA1C  assay. Heterozygous hemoglobin variants (HbS, HgC, etc)do  not significantly interfere with this assay.   However, presence of multiple variants may affect accuracy.            Imaging/Pathology Results:  US Retroperitoneal Complete (Final result)  Result time 03/14/24 22:13:48                    Final result by Susu Garcia MD (03/14/24 22:13:48)                                    Impression:         Elevated bilateral renal indices.  Findings may suggest medical renal disease.        Electronically signed by:     Susu Garcia  Date:                                            03/14/2024  Time:                                            22:13                             Narrative:      EXAMINATION:  ULTRASOUND RETROPERITONEAL COMPLETE     CLINICAL HISTORY:  elaine;     TECHNIQUE:  Real-time ultrasound of the retroperitoneum was performed, including the urinary bladder and kidneys.     COMPARISON:  CT abdomen pelvis 03/14/2024     FINDINGS:  Right kidney measures 12.2 cm.  The resistive index is 0.82.  There is no hydronephrosis.     Left kidney measures 11.7.  The resistive index is 0.79.  There is no hydronephrosis.     The bladder is within normal limits.                                                US Liver with Doppler (xpd) (Final result)  Result time 03/15/24 00:55:23                    Final result by Bradley Moctezuma MD (03/15/24 00:55:23)                                    Impression:         Satisfactory Doppler evaluation of the liver.     Mild hepatosplenomegaly with splenic collaterals.     Prominence of the common bile duct, which can be  seen post cholecystectomy.     Additional findings within the body of the report.     Electronically signed by resident: Virginia Cui  Date:                                            03/15/2024  Time:                                            00:39     Electronically signed by:     Bradley Moctezuma  Date:                                            03/15/2024  Time:                                            00:55                             Narrative:      EXAMINATION:  US LIVER WITH DOPPLER     CLINICAL HISTORY:  transaminitis;     TECHNIQUE:  Complete abdominal ultrasound with doppler.  Color and spectral Doppler were performed.     COMPARISON:  CT 02/14/2023     FINDINGS:  Pancreas: The visualized portion of the pancreas is unremarkable.     Liver: Enlarged measuring 19.3 cm.  The liver demonstrates homogeneous echotexture.  HRI measures 0.8.  No focal hepatic lesions are seen.     Biliary system: Gallbladder has been surgically removed.  The common duct prominent measuring 8 mm, which can be seen with cholecystectomy.  No dilated intrahepatic radicles are seen.     The spleen is mildly enlarged in size measuring 13.5 x 3.2 cm with a homogeneous echotexture.     The aorta tapers normally.     The kidneys are normal in size without focal abnormality or evidence of hydronephrosis.     There is no evidence of ascites.     The main portal vein, right portal vein, left portal vein, middle hepatic vein, right hepatic vein, left hepatic vein, SMV, and IVC are patent with proper directional flow.  Main portal vein reaches peak systolic velocity of 38.6 cm/sec, upper limits of normal.  The main hepatic artery is patent. The umbilical vein is not patent.                                                 CT Abdomen Pelvis  Without Contrast (Final result)  Result time 03/14/24 18:37:53                    Final result by Susu Garcia MD (03/14/24 18:37:53)                                    Impression:         Mild  hepatosplenomegaly.  Mild hepatic steatosis.     Hepatic steatosis.     Uterine leiomyomas.  Associated mass effect on the urinary bladder.     Small hiatal hernia.     4 mm right middle lobe pulmonary nodule.  For a solid nodule <6 mm, Fleischner Society 2017 guidelines recommend no routine follow up for a low risk patient, or follow-up with non-contrast chest CT at 12 months in a high risk patient.     This report was flagged in Epic as abnormal.        Electronically signed by:     Susu Garcia  Date:                                            03/14/2024  Time:                                            18:37                             Narrative:      EXAMINATION:  CT ABDOMEN PELVIS WITHOUT     CLINICAL HISTORY:  Fever, chills, and headache.  Urinary frequency without dysuria.  Mild vaginal spotting.     TECHNIQUE:  5 mm unenhanced axial images from the lung bases through the greater trochanters were performed.  Coronal and sagittal reformatted images were provided.     COMPARISON:  None.     FINDINGS:  Within the limits of a noncontrast examination, there is mild hepatic steatosis.  There is mild hepatosplenomegaly.     Pancreas, kidneys, and adrenal glands are unremarkable.  The gallbladder is surgically absent.     There is no gross abdominal adenopathy or ascites.     The uterus is lobular and moderately enlarged.  It exerts mass effect on the urinary bladder.  There is no free pelvic fluid.  The visualized appendix is not inflamed.     At the lung bases, there is mild bibasilar atelectatic change.  A 4 mm nodule is seen in the right middle lobe (series 2 axial image 24).  There is mild pleural thickening.  Calcified mediastinal and left hilar lymph nodes are present.     There is a small hiatal hernia.                                                CT Head Without Contrast (Final result)  Result time 03/14/24 18:16:59                    Final result by Bradley Moctezuma MD (03/14/24 18:16:59)                                     Impression:         No acute abnormality.        Electronically signed by:     Bradley Moctezuma  Date:                                            03/14/2024  Time:                                            18:16                             Narrative:      EXAMINATION:  CT HEAD WITHOUT CONTRAST     CLINICAL HISTORY:  Headache, new or worsening (Age >= 50y);     TECHNIQUE:  Low dose axial CT images obtained throughout the head without intravenous contrast. Sagittal and coronal reconstructions were performed.     COMPARISON:  None.     FINDINGS:  Intracranial compartment:     Ventricles and sulci are normal in size for age without evidence of hydrocephalus. No extra-axial blood or fluid collections.     The brain parenchyma appears normal. No parenchymal mass, hemorrhage, edema or major vascular distribution infarct.     Skull/extracranial contents (limited evaluation): No fracture. Mastoid air cells and paranasal sinuses are essentially clear.  Right parietal osteoma                                                X-Ray Chest AP Portable (Final result)  Result time 03/14/24 13:24:00                    Final result by Sherman Glass Jr., MD (03/14/24 13:24:00)                                    Impression:         No convincing abnormality identified.  Probable atelectasis left base.        Electronically signed by:     Sherman Glass MD  Date:                                            03/14/2024  Time:                                            13:24                             Narrative:      EXAMINATION:  XR CHEST AP PORTABLE     CLINICAL HISTORY:  Sepsis;     TECHNIQUE:  Single frontal view of the chest was performed.     COMPARISON:  None     FINDINGS:  Monitoring leads in place.  Heart size pulmonary vessels are normal.  Lungs are fairly well aerated.  Few subtle markings left lung base likely atelectasis.  No pneumothorax.                    ASSESSMENT and PLAN:   Problem List:        Patient Active Problem List     Diagnosis Date Noted    Endometrial adenocarcinoma 03/14/2024    Severe sepsis 03/14/2024    Normocytic anemia due to blood loss 03/14/2024    Hypokalemia 03/14/2024    Metabolic acidosis, normal anion gap (NAG) 03/14/2024    ROBERTO (acute kidney injury) 03/14/2024    Transaminitis 03/14/2024    Hyperglycemia 03/14/2024         Assessment:   - Ms.Lucia Gilmore is 68 y.o. female is hospitalized due Severe sepsis.  - Patient's expectations are in accordance with the plan for management.      Plan:  1. Severe sepsis  Patient came in with sepsis criteria.  Patient has not presented fever, tachycardia, BP within ranges  She has an elevated WBC, creatinine and LFT, lactic acid now within normal ranges.  UA negative, CXR negative, no skin lesions noted  Patient remains asymptomatic with no clear infective focus and adequate clinical evolution.  Blood cultures anaerobic gram stain positive for gram negative rods     Plan:  - Change AB to flagyl  - Blood cultures pending, reevaluate tomorrow      2. ROBERTO  Patient with acute kidney injury of unknown source. Baseline creatinine unknown, last serum creatinine of 1.3 down from 1.9     Plan:  - Avoid nephrotoxic agents such as NSAIDs, gadolinium and IV radiocontrast.   - Renally dose meds to current GFR.      3. Transaminitis  Elevated LFTs on admission, not clear baseline. Now within normal range  CT abdomen with hepatic steatosis, liver US with doppler reported mild hepatosplenomegaly with splenic collaterals, prominence of the common bile duct, which can be seen post cholecystectomy.     Plan:   - Avoid hepatotoxic drugs     4. Normocytic anemia due to blood loss  Patient with normocytic anemia on admission, with history of vaginal bleeding.  Last Hb of 8.9 down from 10.1 likely from dilution and fluid redistribution due to fluid administration.  Patient candidate for surgical intervention of endometrial adenocarcinoma outpatient  Patient denies  lightheadedness, denies additional source of blood loss     Plan:  - Transfuse if patient becomes hemodynamically unstable, symptomatic or H/H drops below 7/21   - Ob/gyn indicates candidate to surgical procedure to be done outpatient     5. Hyperglycemia  Serum glucose of 165 on admission, no history of diabetes.  This morning's glucose is 90, HbA1c of 5.7  Patient in prediabetes ranges, does not require additional treatment     Plan:  - Monitor blood glucose levels     6. Metabolic acidosis, normal anion gap  Low CO2 on admin of 21, last one of 22.      7. Endometrial adenocarcinoma  Recent diagnosis of ocally necrotic high grade endometrial adenocarcinoma. Seen by OBGYN today who recommend chest CT for full staging. Patient candidate for surgery that does not need to be done inpatient.  Patient desires to transfer care to [facility] with our Gyn Onc clinic as outpatient     Plan:  - Chest CT without evidence of metastases  - Transfer care to Gyn Onc at [facility], follow outpatient           Discharge Plan:   - Upon discharge, Ms.Lucia Gilmore should follow-up with Gyn Onc at [facility] in the future on an as-needed basis.     Patient Education:  - Diet:  Patient has been advised to follow a diet emphasizing vegetables, fruits, whole grains, low-fat dairy products, fish, legumes and nuts. Patient has been advised to limit sodium, sweets and red meat.   - Exercise: Patient has been advised that regular physical activity reduces health risk, positively impacts risk factors and aids in recovery. Patients who are able should engage in mild-to-moderate-intensity aerobic for a minimum of 20 minutes 3-4 times a week. For patients with deficits that impair their ability to exercise, a supervised exercise program can be beneficial.  - Smoking: Patient has been advised that counseling with or without drug therapy is available to help quit smoking.  - Sleep Apnea: Patient has been advised that a referral for testing is available, and  that treatment with positive airway pressure may be beneficial.  - [place] Physician Orders for Scope of Treatment (LaPOST): A LaPOST is a document that details a patient's goals of care and treatment preferences. The document should be completed after a thorough discussion with your Primary Care Provider and family members. Completion of a LaPOST document encourages communication between doctors and a patient, enables a patient to make informed decisions, and clearly documents these decisions for future healthcare providers. A LaPOST document can help ensure that a patient's wishes are honored and reduce patient and family stress regarding decision-making.      I spent 60 minutes on this visit, including face-to-face with the patient, chart review, and updating medical, surgical, social, and family history.  All questions were directed to the medical team and answered.      --  Joao Klein    Medical Student

## 2024-03-16 NOTE — SUBJECTIVE & OBJECTIVE
Interval History: NAEON. Blood cultures growing bacterioids        Vital Signs (Most Recent):  Temp: 98.1 °F (36.7 °C) (03/16/24 1038)  Pulse: 77 (03/16/24 1038)  Resp: 18 (03/16/24 1038)  BP: (!) 112/59 (03/16/24 1038)  SpO2: 99 % (03/16/24 1038) Vital Signs (24h Range):  Temp:  [97.5 °F (36.4 °C)-98.4 °F (36.9 °C)] 98.1 °F (36.7 °C)  Pulse:  [71-91] 77  Resp:  [18] 18  SpO2:  [97 %-99 %] 99 %  BP: ()/(59-70) 112/59     Weight: 81.6 kg (180 lb)  Body mass index is 29.95 kg/m².    Intake/Output Summary (Last 24 hours) at 3/16/2024 1241  Last data filed at 3/16/2024 0500  Gross per 24 hour   Intake --   Output 550 ml   Net -550 ml         Physical Exam  Vitals and nursing note reviewed.   Constitutional:       General: She is not in acute distress.     Appearance: Normal appearance. She is not ill-appearing.   HENT:      Head: Normocephalic and atraumatic.      Right Ear: External ear normal.      Left Ear: External ear normal.      Nose: Nose normal.   Eyes:      General: No scleral icterus.     Conjunctiva/sclera: Conjunctivae normal.      Pupils: Pupils are equal, round, and reactive to light.   Cardiovascular:      Rate and Rhythm: Normal rate and regular rhythm.   Pulmonary:      Effort: Pulmonary effort is normal. No respiratory distress.   Abdominal:      General: Abdomen is flat. There is no distension.   Musculoskeletal:         General: Normal range of motion.      Cervical back: Normal range of motion.      Right lower leg: No edema.      Left lower leg: No edema.   Skin:     Coloration: Skin is not jaundiced.      Findings: No bruising.   Neurological:      Mental Status: She is alert and oriented to person, place, and time. Mental status is at baseline.             Significant Labs: All pertinent labs within the past 24 hours have been reviewed.    Significant Imaging: I have reviewed all pertinent imaging results/findings within the past 24 hours.

## 2024-03-16 NOTE — PLAN OF CARE
Problem: Adult Inpatient Plan of Care  Goal: Plan of Care Review  Outcome: Ongoing, Progressing  Flowsheets (Taken 3/16/2024 0015)  Plan of Care Reviewed With: patient  Goal: Patient-Specific Goal (Individualized)  Outcome: Ongoing, Progressing  Goal: Absence of Hospital-Acquired Illness or Injury  Outcome: Ongoing, Progressing  Intervention: Identify and Manage Fall Risk  Flowsheets (Taken 3/16/2024 0015)  Safety Promotion/Fall Prevention:   assistive device/personal item within reach   Fall Risk reviewed with patient/family  Goal: Optimal Comfort and Wellbeing  Outcome: Ongoing, Progressing  Intervention: Monitor Pain and Promote Comfort  Flowsheets (Taken 3/16/2024 0015)  Pain Management Interventions:   pain management plan reviewed with patient/caregiver   quiet environment facilitated   relaxation techniques promoted  Goal: Readiness for Transition of Care  Outcome: Ongoing, Progressing     Problem: Adjustment to Illness (Sepsis/Septic Shock)  Goal: Optimal Coping  Outcome: Ongoing, Progressing     Problem: Bleeding (Sepsis/Septic Shock)  Goal: Absence of Bleeding  Outcome: Ongoing, Progressing     Problem: Glycemic Control Impaired (Sepsis/Septic Shock)  Goal: Blood Glucose Level Within Desired Range  Outcome: Ongoing, Progressing  Intervention: Optimize Glycemic Control  Flowsheets (Taken 3/16/2024 0015)  Glycemic Management: blood glucose monitored     Problem: Infection Progression (Sepsis/Septic Shock)  Goal: Absence of Infection Signs and Symptoms  Outcome: Ongoing, Progressing  Intervention: Initiate Sepsis Management  Flowsheets (Taken 3/16/2024 0015)  Isolation Precautions: precautions maintained     Problem: Nutrition Impaired (Sepsis/Septic Shock)  Goal: Optimal Nutrition Intake  Outcome: Ongoing, Progressing     Problem: Fluid and Electrolyte Imbalance (Acute Kidney Injury/Impairment)  Goal: Fluid and Electrolyte Balance  Outcome: Ongoing, Progressing     Problem: Oral Intake Inadequate (Acute  Kidney Injury/Impairment)  Goal: Optimal Nutrition Intake  Outcome: Ongoing, Progressing     Problem: Renal Function Impairment (Acute Kidney Injury/Impairment)  Goal: Effective Renal Function  Outcome: Ongoing, Progressing  Intervention: Monitor and Support Renal Function  Flowsheets (Taken 3/16/2024 0015)  Medication Review/Management: medications reviewed

## 2024-03-16 NOTE — ASSESSMENT & PLAN NOTE
Patient's anemia is currently controlled. Has not received any PRBCs to date. Etiology likely d/t chronic blood loss  Current CBC reviewed-   Lab Results   Component Value Date    HGB 8.9 (L) 03/16/2024    HCT 29.0 (L) 03/16/2024     Monitor serial CBC and transfuse if patient becomes hemodynamically unstable, symptomatic or H/H drops below 7/21.    Plan:  - Iron studies ordered  - Has had vaginal bleeding for over a year, recently dx with uterine adenocarcinoma, obgyn consulted for further recommendatiosn  - holding dvt prophylaxis until CT scans are read to rule out active bleed

## 2024-03-16 NOTE — HOSPITAL COURSE
Patient admitted to  with sepsis. Initially treated with BSABX, later transitioned to IV Flagyl and Ceftriaxone. Admission blood cultures grew Bacteroides in one set and GPRs in the second set (awaiting speciation). Repeat cultures drawn which were negative. Suspect bacteremia secondary to necrotic tumor. CT chest showed scattered solid pulmonary micronodules (up to 3mm) but no overt evidence of metastases. Patient HDS and feeling well at this time. Will urgently refer patient to [facility] on discharge. Will be discharged with PO flagyl and cefpodoxime for a total of 14 days of antibiotics.

## 2024-03-16 NOTE — ASSESSMENT & PLAN NOTE
Patient with acute kidney injury/acute renal failure likely due to  unknown.  ROBERTO is currently stable. Baseline creatinine unknown - Labs reviewed- Renal function/electrolytes with Estimated Creatinine Clearance: 43.7 mL/min (based on SCr of 1.3 mg/dL). according to latest data. Monitor urine output and serial BMP and adjust therapy as needed. Avoid nephrotoxins and renally dose meds for GFR listed above.    Plan:   Lab Results   Component Value Date    CREATININE 1.3 03/16/2024     resolving

## 2024-03-16 NOTE — ASSESSMENT & PLAN NOTE
This patient does have evidence of infective focus  My overall impression is sepsis.  Source: Unknown  Antibiotics given-   Antibiotics (72h ago, onward)      Start     Stop Route Frequency Ordered    03/15/24 2000  piperacillin-tazobactam (ZOSYN) 4.5 g in dextrose 5 % in water (D5W) 100 mL IVPB (MB+)         -- IV Every 8 hours (non-standard times) 03/15/24 1803          Latest lactate reviewed-  Recent Labs   Lab 03/14/24  1233 03/14/24  1821 03/15/24  0645   LACTATE  --    < > 0.7   POCLAC 3.13*  --   --     < > = values in this interval not displayed.       Organ dysfunction indicated by Acute kidney injury and Acute liver injury    Fluid challenge Actual Body weight- Patient will receive 30ml/kg actual body weight to calculate fluid bolus for treatment of septic shock.     Post- resuscitation assessment Yes Perfusion exam was performed within 6 hours of septic shock presentation after bolus shows Adequate tissue perfusion assessed by non-invasive monitoring     UA negative, CXR negative, no skin lesions noted, CT abdomen pending. Source unclear at this point.  Plan:  - Blood cultures growing Bacteroides, possibly from uterine adenocarcinoma tumor necrosis   - Will continue broad spec abx, likely switch to Flagyl tomorrow to continue outpatient   - Lactic acid resolved

## 2024-03-16 NOTE — ASSESSMENT & PLAN NOTE
The endometrial biopsy reveals extensive replacement of normal endometrial tissue by a high-grade/poorly differentiated adenocarcinoma with pleomorphic nuclear features in some of the areas examined. Mitotic activity is brisk (up to 6 mitoses in a single high power field). There are scattered areas of necrosis throughout the tumor. Per OBGYN note from today was trying to reach patient to discuss test results, patient was not able to answer the phone.     Plan:  - GYN onc consulted, not recommending inpatient intervention  - CT chest w contrast ordered per gyn to stage tumor - no evidence of metastasis   - Will FU outpatient for intervention

## 2024-03-16 NOTE — ASSESSMENT & PLAN NOTE
Patient has hypokalemia which is Acute and currently controlled. Most recent potassium levels reviewed-   Lab Results   Component Value Date    K 4.0 03/16/2024   . Will continue potassium replacement per protocol and recheck repeat levels after replacement completed.

## 2024-03-17 PROBLEM — N17.9 AKI (ACUTE KIDNEY INJURY): Status: RESOLVED | Noted: 2024-03-14 | Resolved: 2024-03-17

## 2024-03-17 PROBLEM — E87.20 METABOLIC ACIDOSIS, NORMAL ANION GAP (NAG): Status: RESOLVED | Noted: 2024-03-14 | Resolved: 2024-03-17

## 2024-03-17 PROBLEM — R74.01 TRANSAMINITIS: Status: RESOLVED | Noted: 2024-03-14 | Resolved: 2024-03-17

## 2024-03-17 PROBLEM — E87.6 HYPOKALEMIA: Status: RESOLVED | Noted: 2024-03-14 | Resolved: 2024-03-17

## 2024-03-17 LAB
ACINETOBACTER CALCOACETICUS/BAUMANNII COMPLEX: NOT DETECTED
ALBUMIN SERPL BCP-MCNC: 2.8 G/DL (ref 3.5–5.2)
ALP SERPL-CCNC: 112 U/L (ref 55–135)
ALT SERPL W/O P-5'-P-CCNC: 27 U/L (ref 10–44)
ANION GAP SERPL CALC-SCNC: 9 MMOL/L (ref 8–16)
AST SERPL-CCNC: 19 U/L (ref 10–40)
BACTERIA BLD CULT: ABNORMAL
BACTEROIDES FRAGILIS: NOT DETECTED
BASOPHILS # BLD AUTO: 0.04 K/UL (ref 0–0.2)
BASOPHILS NFR BLD: 0.5 % (ref 0–1.9)
BILIRUB SERPL-MCNC: 0.4 MG/DL (ref 0.1–1)
BUN SERPL-MCNC: 16 MG/DL (ref 8–23)
CALCIUM SERPL-MCNC: 9 MG/DL (ref 8.7–10.5)
CANDIDA ALBICANS: NOT DETECTED
CANDIDA AURIS: NOT DETECTED
CANDIDA GLABRATA: NOT DETECTED
CANDIDA KRUSEI: NOT DETECTED
CANDIDA PARAPSILOSIS: NOT DETECTED
CANDIDA TROPICALIS: NOT DETECTED
CHLORIDE SERPL-SCNC: 108 MMOL/L (ref 95–110)
CO2 SERPL-SCNC: 23 MMOL/L (ref 23–29)
CREAT SERPL-MCNC: 1.3 MG/DL (ref 0.5–1.4)
CRYPTOCOCCUS NEOFORMANS/GATTII: NOT DETECTED
CTX-M GENE (ESBL PRODUCER): NORMAL
DIFFERENTIAL METHOD BLD: ABNORMAL
ENTEROBACTER CLOACAE COMPLEX: NOT DETECTED
ENTEROBACTERALES: NOT DETECTED
ENTEROCOCCUS FAECALIS: NOT DETECTED
ENTEROCOCCUS FAECIUM: NOT DETECTED
EOSINOPHIL # BLD AUTO: 0.3 K/UL (ref 0–0.5)
EOSINOPHIL NFR BLD: 3.1 % (ref 0–8)
ERYTHROCYTE [DISTWIDTH] IN BLOOD BY AUTOMATED COUNT: 13.9 % (ref 11.5–14.5)
ESCHERICHIA COLI: NOT DETECTED
EST. GFR  (NO RACE VARIABLE): 44.8 ML/MIN/1.73 M^2
GLUCOSE SERPL-MCNC: 84 MG/DL (ref 70–110)
HAEMOPHILUS INFLUENZAE: NOT DETECTED
HCT VFR BLD AUTO: 28.8 % (ref 37–48.5)
HGB BLD-MCNC: 9 G/DL (ref 12–16)
IMM GRANULOCYTES # BLD AUTO: 0.04 K/UL (ref 0–0.04)
IMM GRANULOCYTES NFR BLD AUTO: 0.5 % (ref 0–0.5)
IMP GENE (CARBAPENEM RESISTANT): NORMAL
KLEBSIELLA AEROGENES: NOT DETECTED
KLEBSIELLA OXYTOCA: NOT DETECTED
KLEBSIELLA PNEUMONIAE GROUP: NOT DETECTED
KPC RESISTANCE GENE (CARBAPENEM): NORMAL
LISTERIA MONOCYTOGENES: NOT DETECTED
LYMPHOCYTES # BLD AUTO: 1.8 K/UL (ref 1–4.8)
LYMPHOCYTES NFR BLD: 22 % (ref 18–48)
MAGNESIUM SERPL-MCNC: 2.1 MG/DL (ref 1.6–2.6)
MCH RBC QN AUTO: 25.9 PG (ref 27–31)
MCHC RBC AUTO-ENTMCNC: 31.3 G/DL (ref 32–36)
MCR-1: NORMAL
MCV RBC AUTO: 83 FL (ref 82–98)
MEC A/C AND MREJ (MRSA): NORMAL
MEC A/C: NORMAL
MONOCYTES # BLD AUTO: 0.5 K/UL (ref 0.3–1)
MONOCYTES NFR BLD: 6.4 % (ref 4–15)
NDM GENE (CARBAPENEM RESISTANT): NORMAL
NEISSERIA MENINGITIDIS: NOT DETECTED
NEUTROPHILS # BLD AUTO: 5.6 K/UL (ref 1.8–7.7)
NEUTROPHILS NFR BLD: 67.5 % (ref 38–73)
NRBC BLD-RTO: 0 /100 WBC
OXA-48-LIKE (CARBAPENEM RESISTANT): NORMAL
PHOSPHATE SERPL-MCNC: 3.8 MG/DL (ref 2.7–4.5)
PLATELET # BLD AUTO: 191 K/UL (ref 150–450)
PMV BLD AUTO: 10.9 FL (ref 9.2–12.9)
POTASSIUM SERPL-SCNC: 4.1 MMOL/L (ref 3.5–5.1)
PROT SERPL-MCNC: 6.8 G/DL (ref 6–8.4)
PROTEUS SPECIES: NOT DETECTED
PSEUDOMONAS AERUGINOSA: NOT DETECTED
RBC # BLD AUTO: 3.47 M/UL (ref 4–5.4)
SALMONELLA SP: NOT DETECTED
SERRATIA MARCESCENS: NOT DETECTED
SODIUM SERPL-SCNC: 140 MMOL/L (ref 136–145)
STAPHYLOCOCCUS AUREUS: NOT DETECTED
STAPHYLOCOCCUS EPIDERMIDIS: NOT DETECTED
STAPHYLOCOCCUS LUGDUNESIS: NOT DETECTED
STAPHYLOCOCCUS SPECIES: NOT DETECTED
STENOTROPHOMONAS MALTOPHILIA: NOT DETECTED
STREPTOCOCCUS AGALACTIAE: NOT DETECTED
STREPTOCOCCUS PNEUMONIAE: NOT DETECTED
STREPTOCOCCUS PYOGENES: NOT DETECTED
STREPTOCOCCUS SPECIES: NOT DETECTED
VAN A/B (VRE GENE): NORMAL
VIM GENE (CARBAPENEM RESISTANT): NORMAL
WBC # BLD AUTO: 8.29 K/UL (ref 3.9–12.7)

## 2024-03-17 PROCEDURE — 36415 COLL VENOUS BLD VENIPUNCTURE: CPT

## 2024-03-17 PROCEDURE — 63600175 PHARM REV CODE 636 W HCPCS: Performed by: HOSPITALIST

## 2024-03-17 PROCEDURE — 21400001 HC TELEMETRY ROOM

## 2024-03-17 PROCEDURE — 63600175 PHARM REV CODE 636 W HCPCS: Mod: JZ,JG

## 2024-03-17 PROCEDURE — 80053 COMPREHEN METABOLIC PANEL: CPT

## 2024-03-17 PROCEDURE — 25000003 PHARM REV CODE 250

## 2024-03-17 PROCEDURE — 83735 ASSAY OF MAGNESIUM: CPT

## 2024-03-17 PROCEDURE — 87040 BLOOD CULTURE FOR BACTERIA: CPT

## 2024-03-17 PROCEDURE — 84100 ASSAY OF PHOSPHORUS: CPT

## 2024-03-17 PROCEDURE — 85025 COMPLETE CBC W/AUTO DIFF WBC: CPT

## 2024-03-17 PROCEDURE — 25000003 PHARM REV CODE 250: Performed by: HOSPITALIST

## 2024-03-17 RX ORDER — METRONIDAZOLE 500 MG/100ML
500 INJECTION, SOLUTION INTRAVENOUS
Status: DISCONTINUED | OUTPATIENT
Start: 2024-03-17 | End: 2024-03-19 | Stop reason: HOSPADM

## 2024-03-17 RX ADMIN — ACETAMINOPHEN 650 MG: 325 TABLET ORAL at 05:03

## 2024-03-17 RX ADMIN — METRONIDAZOLE 500 MG: 5 INJECTION, SOLUTION INTRAVENOUS at 01:03

## 2024-03-17 RX ADMIN — Medication 6 MG: at 09:03

## 2024-03-17 RX ADMIN — PIPERACILLIN SODIUM AND TAZOBACTAM SODIUM 4.5 G: 4; .5 INJECTION, POWDER, FOR SOLUTION INTRAVENOUS at 05:03

## 2024-03-17 RX ADMIN — ACETAMINOPHEN 650 MG: 325 TABLET ORAL at 01:03

## 2024-03-17 RX ADMIN — METRONIDAZOLE 500 MG: 5 INJECTION, SOLUTION INTRAVENOUS at 09:03

## 2024-03-17 NOTE — ASSESSMENT & PLAN NOTE
Patient's anemia is currently controlled. Has not received any PRBCs to date. Etiology likely d/t chronic blood loss  Current CBC reviewed-   Lab Results   Component Value Date    HGB 9.0 (L) 03/17/2024    HCT 28.8 (L) 03/17/2024     Monitor serial CBC and transfuse if patient becomes hemodynamically unstable, symptomatic or H/H drops below 7/21.    Plan:  - Iron studies ordered  - Has had vaginal bleeding for over a year, recently dx with uterine adenocarcinoma, obgyn consulted for further recommendations  - holding dvt prophylaxis until CT scans are read to rule out active bleed

## 2024-03-17 NOTE — PROGRESS NOTES
[facility] Medicine  Progress Note    Patient Name: Cherri Gilmore  MRN: [id number]  Patient Class: IP- Inpatient   Admission Date: 3/14/2024  Length of Stay: 3 days  Attending Physician: Shellie Murillo*  Primary Care Provider: Winter, Primary Doctor        Subjective:     Principal Problem:Severe sepsis    HPI:  68 year old F with PMH of post menopausal bleeding, recent pathology report noting focally necrotic high grade endometrial adenocarcinoma, presenting for fever of 103.1 and chills. Reports that yesterday started developing fever and chills and posterior head pain which she feels was a result of the rigors. Head pain has since resolved. Pt denies chest pain, sob, abdominal pain, n/v/d, constipation, fever, chills, or weakness of the extremities. Discussed cancer diagnosis with patient and stressed importance of follow up with GYN. Admitted to  for septic shock.    On presentation to [facility], the patient was alert, oriented, no complaints.  VS Normotensive, Febrile with temperature of 103.1, and tachycardic at 145 .  Initial labs notable for hgb 10.1, k 3.4, bun/cr of 24/1.9, tbili 1.2,  ALT 78, . CXR clear, pending CT head and abdomen. Urinalysis does not appear infectious. Therapy/stabilization measures were started, notable for Ceftriaxone and 2.5L fluid bolus.      Overview/Hospital Course:  Patient admitted to  with sepsis. Initially treated with BSABX, later transitioned to IV Flagyl. Admission blood cultures grew Bacteroides in one set and GPRs in the second set (awaiting speciation). Repeat cultures drawn. Suspect bacteremia secondary to necrotic tumor. CT chest showed scattered solid pulmonary micronodules (up to 3mm) but no overt evidence of metastases. Patient HDS and feeling well at this time. Will refer patient to [facility] on discharge.     Interval History: NAEON. Second set of admission BCx grew GPRs. First set speciated to bacteroides. Repeat BCx  drawn today. De-escalated from Zosyn to Flagyl. Patient HDS, feeling well, no complaints, reports hematuria has resolved.     Review of Systems   Constitutional:  Negative for activity change, appetite change, chills, fatigue and fever.   Eyes:  Negative for photophobia and visual disturbance.   Respiratory:  Negative for cough, chest tightness, shortness of breath and wheezing.    Cardiovascular:  Negative for chest pain.   Gastrointestinal:  Negative for abdominal distention, abdominal pain, blood in stool, constipation, diarrhea, nausea and vomiting.   Genitourinary:  Negative for difficulty urinating and dysuria.   Musculoskeletal:  Negative for gait problem.   Skin:  Negative for color change.   Neurological:  Negative for dizziness, weakness, light-headedness and headaches.   Psychiatric/Behavioral:  Negative for agitation, behavioral problems and confusion.      Objective:     Vital Signs (Most Recent):  Temp: 98.1 °F (36.7 °C) (03/17/24 1128)  Pulse: 67 (03/17/24 1128)  Resp: 18 (03/17/24 1128)  BP: 115/67 (03/17/24 1128)  SpO2: 97 % (03/17/24 1128) Vital Signs (24h Range):  Temp:  [97.9 °F (36.6 °C)-99 °F (37.2 °C)] 98.1 °F (36.7 °C)  Pulse:  [67-86] 67  Resp:  [18] 18  SpO2:  [96 %-99 %] 97 %  BP: (106-141)/(62-71) 115/67     Weight: 81.6 kg (180 lb)  Body mass index is 29.95 kg/m².    Intake/Output Summary (Last 24 hours) at 3/17/2024 1334  Last data filed at 3/17/2024 0527  Gross per 24 hour   Intake --   Output 1000 ml   Net -1000 ml         Physical Exam  Vitals and nursing note reviewed.   Constitutional:       General: She is not in acute distress.     Appearance: Normal appearance. She is not ill-appearing.   HENT:      Head: Normocephalic and atraumatic.      Right Ear: External ear normal.      Left Ear: External ear normal.      Nose: Nose normal.   Eyes:      General: No scleral icterus.     Conjunctiva/sclera: Conjunctivae normal.   Cardiovascular:      Rate and Rhythm: Normal rate and regular  rhythm.   Pulmonary:      Effort: Pulmonary effort is normal. No respiratory distress.   Abdominal:      General: Abdomen is flat. There is no distension.   Musculoskeletal:         General: Normal range of motion.      Cervical back: Normal range of motion.      Right lower leg: No edema.      Left lower leg: No edema.   Skin:     Coloration: Skin is not jaundiced.      Findings: No bruising.   Neurological:      Mental Status: She is alert and oriented to person, place, and time. Mental status is at baseline.             Significant Labs: All pertinent labs within the past 24 hours have been reviewed.  CBC:   Recent Labs   Lab 03/16/24  0615 03/17/24  0728   WBC 11.55 8.29   HGB 8.9* 9.0*   HCT 29.0* 28.8*    191     CMP:   Recent Labs   Lab 03/16/24  0615 03/17/24  0728    140   K 4.0 4.1    108   CO2 22* 23   GLU 90 84   BUN 25* 16   CREATININE 1.3 1.3   CALCIUM 9.2 9.0   PROT 6.7 6.8   ALBUMIN 2.9* 2.8*   BILITOT 0.5 0.4   ALKPHOS 121 112   AST 24 19   ALT 33 27   ANIONGAP 8 9       Significant Imaging: I have reviewed all pertinent imaging results/findings within the past 24 hours.    Assessment/Plan:      * Severe sepsis  This patient does have evidence of infective focus  My overall impression is sepsis.  Source: Unknown  Antibiotics given-   Antibiotics (72h ago, onward)      Start     Stop Route Frequency Ordered    03/17/24 1315  metronidazole IVPB 500 mg         -- IV Every 8 hours (non-standard times) 03/17/24 1210          Latest lactate reviewed-  Recent Labs   Lab 03/15/24  0645   LACTATE 0.7       Organ dysfunction indicated by Acute kidney injury and Acute liver injury    Fluid challenge Actual Body weight- Patient will receive 30ml/kg actual body weight to calculate fluid bolus for treatment of septic shock.     Post- resuscitation assessment Yes Perfusion exam was performed within 6 hours of septic shock presentation after bolus shows Adequate tissue perfusion assessed by  non-invasive monitoring     UA negative, CXR negative, no skin lesions noted.    Plan:  - Admission blood cultures growing Bacteroides in one set and GPRs in the second set. Suspect bacteremia secondary to necrotic uterine tumor.   - Initially on BSABX, now de-escalated to IV Flagyl  - F/u repeat BCx, if clear, patient can discharge on PO flagyl to complete 14d course  - F/u outpatient with GynOnc for source control      Pulmonary nodules  - Solid pulmonary micro nodules measuring up to 3 mm.  Follow-up to be determined by oncology.       Hyperglycemia  Elevated on admission at 165. No reported hx of diabetes though patient has not followed with a physician.    Plan:  - HbA1c ordered - not diabetic  - Will monitor blood glucose levels      Normocytic anemia due to blood loss  Patient's anemia is currently controlled. Has not received any PRBCs to date. Etiology likely d/t chronic blood loss  Current CBC reviewed-   Lab Results   Component Value Date    HGB 9.0 (L) 03/17/2024    HCT 28.8 (L) 03/17/2024     Monitor serial CBC and transfuse if patient becomes hemodynamically unstable, symptomatic or H/H drops below 7/21.    Plan:  - Iron studies ordered  - Has had vaginal bleeding for over a year, recently dx with uterine adenocarcinoma, obgyn consulted for further recommendations  - holding dvt prophylaxis until CT scans are read to rule out active bleed      Endometrial adenocarcinoma  The endometrial biopsy reveals extensive replacement of normal endometrial tissue by a high-grade/poorly differentiated adenocarcinoma with pleomorphic nuclear features in some of the areas examined. Mitotic activity is brisk (up to 6 mitoses in a single high power field). There are scattered areas of necrosis throughout the tumor. Per OBGYN note from today was trying to reach patient to discuss test results, patient was not able to answer the phone.     Plan:  - GYN onc consulted, not recommending inpatient intervention  - CT chest w  contrast ordered per gyn to stage tumor - no evidence of metastasis   - Will FU outpatient for intervention        VTE Risk Mitigation (From admission, onward)           Ordered     IP VTE HIGH RISK PATIENT  Once         03/14/24 1807     Place sequential compression device  Until discontinued         03/14/24 1807     Reason for No Pharmacological VTE Prophylaxis  Once        Question:  Reasons:  Answer:  Active Bleeding    03/14/24 1807                    Discharge Planning   LINDA: 3/18/2024     Code Status: Full Code   Is the patient medically ready for discharge?: No    Reason for patient still in hospital (select all that apply): Treatment  Discharge Plan A: Home with family          Taya Garcia MD  Department of Hospital Medicine   [facility]

## 2024-03-17 NOTE — ASSESSMENT & PLAN NOTE
Patient with acute kidney injury/acute renal failure likely due to  unknown.  ROBERTO is currently stable. Baseline creatinine unknown - Labs reviewed- Renal function/electrolytes with Estimated Creatinine Clearance: 43.7 mL/min (based on SCr of 1.3 mg/dL). according to latest data. Monitor urine output and serial BMP and adjust therapy as needed. Avoid nephrotoxins and renally dose meds for GFR listed above.    Plan:   Lab Results   Component Value Date    CREATININE 1.3 03/17/2024     NOW RESOLVED

## 2024-03-17 NOTE — ASSESSMENT & PLAN NOTE
Patient has hypokalemia which is Acute and currently controlled. Most recent potassium levels reviewed-   Lab Results   Component Value Date    K 4.1 03/17/2024   . Will continue potassium replacement per protocol and recheck repeat levels after replacement completed.

## 2024-03-17 NOTE — SUBJECTIVE & OBJECTIVE
Interval History: NAEON. Second set of admission BCx grew GPRs. First set speciated to bacteroides. Repeat BCx drawn today. De-escalated from Zosyn to Flagyl. Patient HDS, feeling well, no complaints, reports hematuria has resolved.     Review of Systems   Constitutional:  Negative for activity change, appetite change, chills, fatigue and fever.   Eyes:  Negative for photophobia and visual disturbance.   Respiratory:  Negative for cough, chest tightness, shortness of breath and wheezing.    Cardiovascular:  Negative for chest pain.   Gastrointestinal:  Negative for abdominal distention, abdominal pain, blood in stool, constipation, diarrhea, nausea and vomiting.   Genitourinary:  Negative for difficulty urinating and dysuria.   Musculoskeletal:  Negative for gait problem.   Skin:  Negative for color change.   Neurological:  Negative for dizziness, weakness, light-headedness and headaches.   Psychiatric/Behavioral:  Negative for agitation, behavioral problems and confusion.      Objective:     Vital Signs (Most Recent):  Temp: 98.1 °F (36.7 °C) (03/17/24 1128)  Pulse: 67 (03/17/24 1128)  Resp: 18 (03/17/24 1128)  BP: 115/67 (03/17/24 1128)  SpO2: 97 % (03/17/24 1128) Vital Signs (24h Range):  Temp:  [97.9 °F (36.6 °C)-99 °F (37.2 °C)] 98.1 °F (36.7 °C)  Pulse:  [67-86] 67  Resp:  [18] 18  SpO2:  [96 %-99 %] 97 %  BP: (106-141)/(62-71) 115/67     Weight: 81.6 kg (180 lb)  Body mass index is 29.95 kg/m².    Intake/Output Summary (Last 24 hours) at 3/17/2024 1334  Last data filed at 3/17/2024 0527  Gross per 24 hour   Intake --   Output 1000 ml   Net -1000 ml         Physical Exam  Vitals and nursing note reviewed.   Constitutional:       General: She is not in acute distress.     Appearance: Normal appearance. She is not ill-appearing.   HENT:      Head: Normocephalic and atraumatic.      Right Ear: External ear normal.      Left Ear: External ear normal.      Nose: Nose normal.   Eyes:      General: No scleral  icterus.     Conjunctiva/sclera: Conjunctivae normal.   Cardiovascular:      Rate and Rhythm: Normal rate and regular rhythm.   Pulmonary:      Effort: Pulmonary effort is normal. No respiratory distress.   Abdominal:      General: Abdomen is flat. There is no distension.   Musculoskeletal:         General: Normal range of motion.      Cervical back: Normal range of motion.      Right lower leg: No edema.      Left lower leg: No edema.   Skin:     Coloration: Skin is not jaundiced.      Findings: No bruising.   Neurological:      Mental Status: She is alert and oriented to person, place, and time. Mental status is at baseline.             Significant Labs: All pertinent labs within the past 24 hours have been reviewed.  CBC:   Recent Labs   Lab 03/16/24  0615 03/17/24  0728   WBC 11.55 8.29   HGB 8.9* 9.0*   HCT 29.0* 28.8*    191     CMP:   Recent Labs   Lab 03/16/24  0615 03/17/24  0728    140   K 4.0 4.1    108   CO2 22* 23   GLU 90 84   BUN 25* 16   CREATININE 1.3 1.3   CALCIUM 9.2 9.0   PROT 6.7 6.8   ALBUMIN 2.9* 2.8*   BILITOT 0.5 0.4   ALKPHOS 121 112   AST 24 19   ALT 33 27   ANIONGAP 8 9       Significant Imaging: I have reviewed all pertinent imaging results/findings within the past 24 hours.

## 2024-03-17 NOTE — ASSESSMENT & PLAN NOTE
This patient does have evidence of infective focus  My overall impression is sepsis.  Source: Unknown  Antibiotics given-   Antibiotics (72h ago, onward)      Start     Stop Route Frequency Ordered    03/17/24 1315  metronidazole IVPB 500 mg         -- IV Every 8 hours (non-standard times) 03/17/24 1210          Latest lactate reviewed-  Recent Labs   Lab 03/15/24  0645   LACTATE 0.7       Organ dysfunction indicated by Acute kidney injury and Acute liver injury    Fluid challenge Actual Body weight- Patient will receive 30ml/kg actual body weight to calculate fluid bolus for treatment of septic shock.     Post- resuscitation assessment Yes Perfusion exam was performed within 6 hours of septic shock presentation after bolus shows Adequate tissue perfusion assessed by non-invasive monitoring     UA negative, CXR negative, no skin lesions noted.    Plan:  - Admission blood cultures growing Bacteroides in one set and GPRs in the second set. Suspect bacteremia secondary to necrotic uterine tumor.   - Initially on BSABX, now de-escalated to IV Flagyl  - F/u repeat BCx, if clear, patient can discharge on PO flagyl to complete 14d course  - F/u outpatient with GynOnc for source control

## 2024-03-17 NOTE — PLAN OF CARE
[facility] - Med Surg  Initial Discharge Assessment       Primary Care Provider: No, Primary Doctor    Admission Diagnosis: Tachycardia [R00.0]  Elevated LFTs [R79.89]  ROBERTO (acute kidney injury) [N17.9]  Chest pain [R07.9]  Sepsis [A41.9]    Admission Date: 3/14/2024  Expected Discharge Date: 3/17/2024    Transition of Care Barriers: (P) None    Payor: [facility] MCARE / Plan: [facility] DUAL COMPLETE HMO SNP / Product Type: Medicare Advantage /     Extended Emergency Contact Information  Primary Emergency Contact: Miguel Gilmore  Mobile Phone: [phone]  Relation: Son  Preferred language: English   needed? No    Discharge Plan A: (P) Home with family  Discharge Plan B: (P) Home    No Pharmacies Listed      CM met with patient to discuss discharge planning. Patient lives in a single story home with four steps to enter. Prior to hospital admission, patient was independent with ADLs and did not require medical equipment. Once medically ready, patient's son will provide a ride home. Qwill continue to follow for post acute needs. Discharge Plan A and Plan B have been determined by review of patient's clinical status, future medical and therapeutic needs, and coverage/benefits for post-acute care in coordination with multidisciplinary team members.  Initial Assessment (most recent)       Adult Discharge Assessment - 03/17/24 1035          Discharge Assessment    Assessment Type Discharge Planning Assessment     Confirmed/corrected address, phone number and insurance Yes     Confirmed Demographics Correct on Facesheet     Source of Information patient     Communicated LINDA with patient/caregiver Date not available/Unable to determine     Reason For Admission Severe Sepsis     People in Home spouse;child(shreya), adult (P)      Facility Arrived From: Home (P)      Do you expect to return to your current living situation? Yes (P)      Do you have help at home or someone to help you manage your care at home?  Yes (P)      Who are your caregiver(s) and their phone number(s)? Miguel Gilmore Sr (spouse) and Miguel Gilmore (son) [phone] (P)      Prior to hospitilization cognitive status: Alert/Oriented (P)      Current cognitive status: Alert/Oriented (P)      Walking or Climbing Stairs Difficulty no (P)      Dressing/Bathing Difficulty no (P)      Home Layout Able to live on 1st floor (P)      Equipment Currently Used at Home none (P)      Readmission within 30 days? No (P)      Patient currently being followed by outpatient case management? No (P)      Do you currently have service(s) that help you manage your care at home? No (P)      Do you take prescription medications? Yes (P)      Do you have prescription coverage? Yes (P)      Coverage [employer] Mc[employer] (P)      Do you have any problems affording any of your prescribed medications? No (P)      Is the patient taking medications as prescribed? yes (P)      Who is going to help you get home at discharge? Miguel Gilmore Sr (spouse) and Miguel Gilmore (son) [phone] (P)      How do you get to doctors appointments? family or friend will provide (P)      Are you on dialysis? No (P)      Do you take coumadin? No (P)      Discharge Plan A Home with family (P)      Discharge Plan B Home (P)      DME Needed Upon Discharge  other (see comments) (P)    TBD    Discharge Plan discussed with: Patient (P)      Transition of Care Barriers None (P)         Physical Activity    On average, how many days per week do you engage in moderate to strenuous exercise (like a brisk walk)? 0 days (P)      On average, how many minutes do you engage in exercise at this level? 0 min (P)         Financial Resource Strain    How hard is it for you to pay for the very basics like food, housing, medical care, and heating? Not very hard (P)         Housing Stability    In the last 12 months, was there a time when you were not able to pay the mortgage or rent on time? No (P)      In the last 12 months, how  many places have you lived? 2 (P)      In the last 12 months, was there a time when you did not have a steady place to sleep or slept in a shelter (including now)? No (P)         Transportation Needs    In the past 12 months, has lack of transportation kept you from medical appointments or from getting medications? No (P)      In the past 12 months, has lack of transportation kept you from meetings, work, or from getting things needed for daily living? No (P)         Food Insecurity    Within the past 12 months, you worried that your food would run out before you got the money to buy more. Never true (P)      Within the past 12 months, the food you bought just didn't last and you didn't have money to get more. Never true (P)         Stress    Do you feel stress - tense, restless, nervous, or anxious, or unable to sleep at night because your mind is troubled all the time - these days? Not at all (P)         Social Connections    In a typical week, how many times do you talk on the phone with family, friends, or neighbors? More than three times a week (P)      How often do you get together with friends or relatives? More than three times a week (P)      How often do you attend Hinduism or Adventism services? More than 4 times per year (P)      Do you belong to any clubs or organizations such as Hinduism groups, unions, fraternal or athletic groups, or school groups? No (P)      How often do you attend meetings of the clubs or organizations you belong to? Never (P)      Are you , , , , never , or living with a partner?  (P)         Alcohol Use    Q1: How often do you have a drink containing alcohol? Never (P)      Q2: How many drinks containing alcohol do you have on a typical day when you are drinking? Patient does not drink (P)      Q3: How often do you have six or more drinks on one occasion? Never (P)         OTHER    Name(s) of People in Home Miguel Gilmore Sr (spouse) and  Miguel Gilmore (son) [phone] (P)                         Carlo Cramer, RNCM  Case Management  [phone]

## 2024-03-18 PROCEDURE — 21400001 HC TELEMETRY ROOM

## 2024-03-18 PROCEDURE — 63600175 PHARM REV CODE 636 W HCPCS: Mod: JZ,JG

## 2024-03-18 PROCEDURE — 63600175 PHARM REV CODE 636 W HCPCS: Performed by: HOSPITALIST

## 2024-03-18 PROCEDURE — 25000003 PHARM REV CODE 250

## 2024-03-18 PROCEDURE — 25000003 PHARM REV CODE 250: Performed by: HOSPITALIST

## 2024-03-18 RX ADMIN — ACETAMINOPHEN 650 MG: 325 TABLET ORAL at 12:03

## 2024-03-18 RX ADMIN — METRONIDAZOLE 500 MG: 5 INJECTION, SOLUTION INTRAVENOUS at 05:03

## 2024-03-18 RX ADMIN — METRONIDAZOLE 500 MG: 5 INJECTION, SOLUTION INTRAVENOUS at 02:03

## 2024-03-18 RX ADMIN — ACETAMINOPHEN 650 MG: 325 TABLET ORAL at 09:03

## 2024-03-18 RX ADMIN — ACETAMINOPHEN 650 MG: 325 TABLET ORAL at 10:03

## 2024-03-18 RX ADMIN — METRONIDAZOLE 500 MG: 5 INJECTION, SOLUTION INTRAVENOUS at 09:03

## 2024-03-18 RX ADMIN — CEFTRIAXONE SODIUM 2 G: 2 INJECTION, POWDER, FOR SOLUTION INTRAMUSCULAR; INTRAVENOUS at 02:03

## 2024-03-18 RX ADMIN — Medication 6 MG: at 09:03

## 2024-03-18 NOTE — SUBJECTIVE & OBJECTIVE
Interval History: Complaining of pelvic pain that's possibly secondary to movement, improved with tylenol      Objective:     Vital Signs (Most Recent):  Temp: 97.9 °F (36.6 °C) (03/18/24 1608)  Pulse: 68 (03/18/24 1608)  Resp: 18 (03/18/24 1608)  BP: 123/67 (03/18/24 1608)  SpO2: 98 % (03/18/24 1608) Vital Signs (24h Range):  Temp:  [96.9 °F (36.1 °C)-98.4 °F (36.9 °C)] 97.9 °F (36.6 °C)  Pulse:  [62-80] 68  Resp:  [16-18] 18  SpO2:  [97 %-98 %] 98 %  BP: (104-126)/(58-76) 123/67     Weight: 81.6 kg (180 lb)  Body mass index is 29.95 kg/m².    Intake/Output Summary (Last 24 hours) at 3/18/2024 1609  Last data filed at 3/18/2024 0521  Gross per 24 hour   Intake --   Output 500 ml   Net -500 ml         Physical Exam  Vitals and nursing note reviewed.   Constitutional:       General: She is not in acute distress.     Appearance: Normal appearance. She is not ill-appearing.   HENT:      Head: Normocephalic and atraumatic.      Right Ear: External ear normal.      Left Ear: External ear normal.      Nose: Nose normal.   Eyes:      General: No scleral icterus.     Conjunctiva/sclera: Conjunctivae normal.   Cardiovascular:      Rate and Rhythm: Normal rate and regular rhythm.   Pulmonary:      Effort: Pulmonary effort is normal. No respiratory distress.   Abdominal:      General: Abdomen is flat. There is no distension.   Musculoskeletal:         General: Normal range of motion.      Cervical back: Normal range of motion.      Right lower leg: No edema.      Left lower leg: No edema.   Skin:     Coloration: Skin is not jaundiced.      Findings: No bruising.   Neurological:      Mental Status: She is alert and oriented to person, place, and time. Mental status is at baseline.             Significant Labs: All pertinent labs within the past 24 hours have been reviewed.    Significant Imaging: I have reviewed all pertinent imaging results/findings within the past 24 hours.

## 2024-03-18 NOTE — PLAN OF CARE
ESTELLA assigned to patient's care team.  No needs identified at this time.  Pt stated her son will provide transportation home.  Pt ambulatory and independent with ADLs.    Discharge Plan A and Plan B have been determined by review of patient's clinical status, future medical and therapeutic needs, and coverage/benefits for post-acute care in coordination with multidisciplinary team members.     03/18/24 1335   Post-Acute Status   Post-Acute Authorization Other   Coverage [employer] Managed Medicare   Discharge Delays None known at this time   Discharge Plan   Discharge Plan A Home;Home with family   Discharge Plan B Home     Alejandra Roach LMSW  Part-Time-  [facility]  Ext. [phone]

## 2024-03-18 NOTE — PROGRESS NOTES
[facility] Medicine  Progress Note    Patient Name: Cherri Gilmore  MRN: [id number]  Patient Class: IP- Inpatient   Admission Date: 3/14/2024  Length of Stay: 4 days  Attending Physician: Shellie Murillo*  Primary Care Provider: Winter, Primary Doctor        Subjective:     Principal Problem:Severe sepsis        HPI:  68 year old F with PMH of post menopausal bleeding, recent pathology report noting focally necrotic high grade endometrial adenocarcinoma, presenting for fever of 103.1 and chills. Reports that yesterday started developing fever and chills and posterior head pain which she feels was a result of the rigors. Head pain has since resolved. Pt denies chest pain, sob, abdominal pain, n/v/d, constipation, fever, chills, or weakness of the extremities. Discussed cancer diagnosis with patient and stressed importance of follow up with GYN. Admitted to  for septic shock.    On presentation to [facility]-ED, the patient was alert, oriented, no complaints.  VS Normotensive, Febrile with temperature of 103.1, and tachycardic at 145 .  Initial labs notable for hgb 10.1, k 3.4, bun/cr of 24/1.9, tbili 1.2,  ALT 78, . CXR clear, pending CT head and abdomen. Urinalysis does not appear infectious. Therapy/stabilization measures were started, notable for Ceftriaxone and 2.5L fluid bolus.      Overview/Hospital Course:  Patient admitted to  with sepsis. Initially treated with BSABX, later transitioned to IV Flagyl. Admission blood cultures grew Bacteroides in one set and GPRs in the second set (awaiting speciation). Repeat cultures drawn. Suspect bacteremia secondary to necrotic tumor. CT chest showed scattered solid pulmonary micronodules (up to 3mm) but no overt evidence of metastases. Patient HDS and feeling well at this time. Will refer patient to [facility] on discharge.     Interval History: Complaining of pelvic pain that's possibly secondary to movement, improved with  tylenol      Objective:     Vital Signs (Most Recent):  Temp: 97.9 °F (36.6 °C) (03/18/24 1608)  Pulse: 68 (03/18/24 1608)  Resp: 18 (03/18/24 1608)  BP: 123/67 (03/18/24 1608)  SpO2: 98 % (03/18/24 1608) Vital Signs (24h Range):  Temp:  [96.9 °F (36.1 °C)-98.4 °F (36.9 °C)] 97.9 °F (36.6 °C)  Pulse:  [62-80] 68  Resp:  [16-18] 18  SpO2:  [97 %-98 %] 98 %  BP: (104-126)/(58-76) 123/67     Weight: 81.6 kg (180 lb)  Body mass index is 29.95 kg/m².    Intake/Output Summary (Last 24 hours) at 3/18/2024 1609  Last data filed at 3/18/2024 0521  Gross per 24 hour   Intake --   Output 500 ml   Net -500 ml         Physical Exam  Vitals and nursing note reviewed.   Constitutional:       General: She is not in acute distress.     Appearance: Normal appearance. She is not ill-appearing.   HENT:      Head: Normocephalic and atraumatic.      Right Ear: External ear normal.      Left Ear: External ear normal.      Nose: Nose normal.   Eyes:      General: No scleral icterus.     Conjunctiva/sclera: Conjunctivae normal.   Cardiovascular:      Rate and Rhythm: Normal rate and regular rhythm.   Pulmonary:      Effort: Pulmonary effort is normal. No respiratory distress.   Abdominal:      General: Abdomen is flat. There is no distension.   Musculoskeletal:         General: Normal range of motion.      Cervical back: Normal range of motion.      Right lower leg: No edema.      Left lower leg: No edema.   Skin:     Coloration: Skin is not jaundiced.      Findings: No bruising.   Neurological:      Mental Status: She is alert and oriented to person, place, and time. Mental status is at baseline.             Significant Labs: All pertinent labs within the past 24 hours have been reviewed.    Significant Imaging: I have reviewed all pertinent imaging results/findings within the past 24 hours.    Assessment/Plan:      * Severe sepsis  This patient does have evidence of infective focus  My overall impression is sepsis.  Source:  "Unknown  Antibiotics given-   Antibiotics (72h ago, onward)      Start     Stop Route Frequency Ordered    03/18/24 1245  cefTRIAXone (ROCEPHIN) 2 g in dextrose 5 % in water (D5W) 100 mL IVPB (MB+)         -- IV Every 24 hours (non-standard times) 03/18/24 1142    03/17/24 1315  metronidazole IVPB 500 mg         -- IV Every 8 hours (non-standard times) 03/17/24 1210          Latest lactate reviewed-  No results for input(s): "LACTATE", "POCLAC" in the last 72 hours.    Organ dysfunction indicated by Acute kidney injury and Acute liver injury    Fluid challenge Actual Body weight- Patient will receive 30ml/kg actual body weight to calculate fluid bolus for treatment of septic shock.     Post- resuscitation assessment Yes Perfusion exam was performed within 6 hours of septic shock presentation after bolus shows Adequate tissue perfusion assessed by non-invasive monitoring     UA negative, CXR negative, no skin lesions noted.    Plan:  - Admission blood cultures growing Bacteroides in one set and GPRs in the second set. Suspect bacteremia secondary to necrotic uterine tumor.   - Initially on BSABX, now de-escalated to IV Flagyl and Ceftriaxone, pending suceptibilites  - F/u repeat BCx, if clear, patient can discharge on PO flagyl to complete 14d course  - F/u outpatient with GynOnc for source control    Pulmonary nodules  - Solid pulmonary micro nodules measuring up to 3 mm.  Follow-up to be determined by oncology.       Hyperglycemia  Elevated on admission at 165. No reported hx of diabetes though patient has not followed with a physician.    Plan:  - HbA1c ordered - not diabetic  - Will monitor blood glucose levels      Normocytic anemia due to blood loss  Patient's anemia is currently controlled. Has not received any PRBCs to date. Etiology likely d/t chronic blood loss  Current CBC reviewed-   Lab Results   Component Value Date    HGB 9.0 (L) 03/17/2024    HCT 28.8 (L) 03/17/2024     Monitor serial CBC and " transfuse if patient becomes hemodynamically unstable, symptomatic or H/H drops below 7/21.    Plan:  - Iron studies ordered  - Has had vaginal bleeding for over a year, recently dx with uterine adenocarcinoma, obgyn consulted for further recommendations  - holding dvt prophylaxis until CT scans are read to rule out active bleed    Endometrial adenocarcinoma  The endometrial biopsy reveals extensive replacement of normal endometrial tissue by a high-grade/poorly differentiated adenocarcinoma with pleomorphic nuclear features in some of the areas examined. Mitotic activity is brisk (up to 6 mitoses in a single high power field). There are scattered areas of necrosis throughout the tumor. Per OBGYN note from today was trying to reach patient to discuss test results, patient was not able to answer the phone.     Plan:  - GYN onc consulted, not recommending inpatient intervention  - CT chest w contrast ordered per gyn to stage tumor - no evidence of metastasis   - Will FU outpatient for intervention        VTE Risk Mitigation (From admission, onward)           Ordered     IP VTE HIGH RISK PATIENT  Once         03/14/24 1807     Place sequential compression device  Until discontinued         03/14/24 1807     Reason for No Pharmacological VTE Prophylaxis  Once        Question:  Reasons:  Answer:  Active Bleeding    03/14/24 1807                    Discharge Planning   LINDA: 3/18/2024     Code Status: Full Code   Is the patient medically ready for discharge?: No    Reason for patient still in hospital (select all that apply): Patient trending condition  Discharge Plan A: Home, Home with family   Discharge Delays: None known at this time              Zbigniew Jim MD  Department of Hospital Medicine   [facility] Surg

## 2024-03-18 NOTE — PLAN OF CARE
Problem: Adult Inpatient Plan of Care  Goal: Plan of Care Review  Outcome: Ongoing, Progressing  Goal: Patient-Specific Goal (Individualized)  Outcome: Ongoing, Progressing  Goal: Absence of Hospital-Acquired Illness or Injury  Outcome: Ongoing, Progressing  Goal: Optimal Comfort and Wellbeing  Outcome: Ongoing, Progressing  Goal: Readiness for Transition of Care  Outcome: Ongoing, Progressing     Problem: Adjustment to Illness (Sepsis/Septic Shock)  Goal: Optimal Coping  Outcome: Ongoing, Progressing     Problem: Infection Progression (Sepsis/Septic Shock)  Goal: Absence of Infection Signs and Symptoms  Outcome: Ongoing, Progressing     Problem: Fluid and Electrolyte Imbalance (Acute Kidney Injury/Impairment)  Goal: Fluid and Electrolyte Balance  Outcome: Ongoing, Progressing     Problem: Oral Intake Inadequate (Acute Kidney Injury/Impairment)  Goal: Optimal Nutrition Intake  Outcome: Ongoing, Progressing  Rocephin added to antibiotic therapy, otherwise uneventful shift.

## 2024-03-18 NOTE — ASSESSMENT & PLAN NOTE
"This patient does have evidence of infective focus  My overall impression is sepsis.  Source: Unknown  Antibiotics given-   Antibiotics (72h ago, onward)      Start     Stop Route Frequency Ordered    03/18/24 1245  cefTRIAXone (ROCEPHIN) 2 g in dextrose 5 % in water (D5W) 100 mL IVPB (MB+)         -- IV Every 24 hours (non-standard times) 03/18/24 1142    03/17/24 1315  metronidazole IVPB 500 mg         -- IV Every 8 hours (non-standard times) 03/17/24 1210          Latest lactate reviewed-  No results for input(s): "LACTATE", "POCLAC" in the last 72 hours.    Organ dysfunction indicated by Acute kidney injury and Acute liver injury    Fluid challenge Actual Body weight- Patient will receive 30ml/kg actual body weight to calculate fluid bolus for treatment of septic shock.     Post- resuscitation assessment Yes Perfusion exam was performed within 6 hours of septic shock presentation after bolus shows Adequate tissue perfusion assessed by non-invasive monitoring     UA negative, CXR negative, no skin lesions noted.    Plan:  - Admission blood cultures growing Bacteroides in one set and GPRs in the second set. Suspect bacteremia secondary to necrotic uterine tumor.   - Initially on BSABX, now de-escalated to IV Flagyl and Ceftriaxone, pending suceptibilites  - F/u repeat BCx, if clear, patient can discharge on PO flagyl to complete 14d course  - F/u outpatient with GynOnc for source control  "

## 2024-03-19 VITALS
DIASTOLIC BLOOD PRESSURE: 80 MMHG | TEMPERATURE: 98 F | WEIGHT: 180 LBS | SYSTOLIC BLOOD PRESSURE: 130 MMHG | OXYGEN SATURATION: 100 % | RESPIRATION RATE: 16 BRPM | HEART RATE: 75 BPM | HEIGHT: 65 IN | BODY MASS INDEX: 29.99 KG/M2

## 2024-03-19 LAB
BACTERIA BLD CULT: ABNORMAL

## 2024-03-19 PROCEDURE — 25000003 PHARM REV CODE 250: Performed by: HOSPITALIST

## 2024-03-19 PROCEDURE — 63600175 PHARM REV CODE 636 W HCPCS: Performed by: HOSPITALIST

## 2024-03-19 PROCEDURE — 63600175 PHARM REV CODE 636 W HCPCS: Mod: JZ,JG

## 2024-03-19 PROCEDURE — 25000003 PHARM REV CODE 250

## 2024-03-19 RX ORDER — CEFPODOXIME PROXETIL 100 MG/1
200 TABLET, FILM COATED ORAL EVERY 12 HOURS
Qty: 36 TABLET | Refills: 0 | Status: SHIPPED | OUTPATIENT
Start: 2024-03-20 | End: 2024-03-29

## 2024-03-19 RX ORDER — METRONIDAZOLE 500 MG/1
500 TABLET ORAL EVERY 8 HOURS
Qty: 24 TABLET | Refills: 0 | Status: CANCELLED | OUTPATIENT
Start: 2024-03-20 | End: 2024-03-28

## 2024-03-19 RX ORDER — METRONIDAZOLE 500 MG/1
500 TABLET ORAL EVERY 8 HOURS
Qty: 27 TABLET | Refills: 0 | Status: SHIPPED | OUTPATIENT
Start: 2024-03-20 | End: 2024-03-29

## 2024-03-19 RX ADMIN — METRONIDAZOLE 500 MG: 5 INJECTION, SOLUTION INTRAVENOUS at 12:03

## 2024-03-19 RX ADMIN — METRONIDAZOLE 500 MG: 5 INJECTION, SOLUTION INTRAVENOUS at 05:03

## 2024-03-19 RX ADMIN — CEFTRIAXONE SODIUM 2 G: 2 INJECTION, POWDER, FOR SOLUTION INTRAMUSCULAR; INTRAVENOUS at 12:03

## 2024-03-19 RX ADMIN — ACETAMINOPHEN 650 MG: 325 TABLET ORAL at 08:03

## 2024-03-19 NOTE — DISCHARGE SUMMARY
[facility] Medicine  Discharge Summary      Patient Name: Cherri Gilmore  MRN: [id number]  AL: [id number]  Patient Class: IP- Inpatient  Admission Date: 3/14/2024  Hospital Length of Stay: 5 days  Discharge Date and Time:  03/19/2024 3:00 PM  Attending Physician: Shellie Murillo*   Discharging Provider: Zbigniew Jim MD  Primary Care Provider: Winter Primary Doctor  [facility] Medicine Team: [facility] 3 Zbigniew Jim MD  Primary Care Team: [facility] 3    HPI:   68 year old F with PMH of post menopausal bleeding, recent pathology report noting focally necrotic high grade endometrial adenocarcinoma, presenting for fever of 103.1 and chills. Reports that yesterday started developing fever and chills and posterior head pain which she feels was a result of the rigors. Head pain has since resolved. Pt denies chest pain, sob, abdominal pain, n/v/d, constipation, fever, chills, or weakness of the extremities. Discussed cancer diagnosis with patient and stressed importance of follow up with GYN. Admitted to  for septic shock.    On presentation to [facility], the patient was alert, oriented, no complaints.  VS Normotensive, Febrile with temperature of 103.1, and tachycardic at 145 .  Initial labs notable for hgb 10.1, k 3.4, bun/cr of 24/1.9, tbili 1.2,  ALT 78, . CXR clear, pending CT head and abdomen. Urinalysis does not appear infectious. Therapy/stabilization measures were started, notable for Ceftriaxone and 2.5L fluid bolus.      * No surgery found *      Hospital Course:   Patient admitted to  with sepsis. Initially treated with BSABX, later transitioned to IV Flagyl and Ceftriaxone. Admission blood cultures grew Bacteroides in one set and GPRs in the second set (awaiting speciation). Repeat cultures drawn which were negative. Suspect bacteremia secondary to necrotic tumor. CT chest showed scattered solid pulmonary micronodules (up to 3mm) but no overt evidence of  metastases. Patient HDS and feeling well at this time. Will urgently refer patient to [facility] on discharge. Will be discharged with PO flagyl and cefpodoxime for a total of 14 days of antibiotics.     Goals of Care Treatment Preferences:  Code Status: Full Code      Consults:   Consults (From admission, onward)          Status Ordering Provider     Inpatient consult to Gynecologic Oncology  Once        Provider:  (Not yet assigned)    MINISTERIO Bolden            No new Assessment & Plan notes have been filed under this hospital service since the last note was generated.  Service: Hospital Medicine    Final Active Diagnoses:    Diagnosis Date Noted POA    PRINCIPAL PROBLEM:  Severe sepsis [A41.9, R65.20] 03/14/2024 Yes    Pulmonary nodules [R91.8] 03/16/2024 Yes    Endometrial adenocarcinoma [C54.1] 03/14/2024 Yes    Normocytic anemia due to blood loss [D50.0] 03/14/2024 Yes    Hyperglycemia [R73.9] 03/14/2024 Yes      Problems Resolved During this Admission:    Diagnosis Date Noted Date Resolved POA    Hypokalemia [E87.6] 03/14/2024 03/17/2024 Yes    Metabolic acidosis, normal anion gap (NAG) [E87.20] 03/14/2024 03/17/2024 Yes    ROBERTO (acute kidney injury) [N17.9] 03/14/2024 03/17/2024 Yes    Transaminitis [R74.01] 03/14/2024 03/17/2024 Yes       Discharged Condition: stable    Disposition:     Follow Up:   Follow-up Information       No, Primary Doctor Follow up.                           Patient Instructions:      Ambulatory referral/consult to Gynecologic Oncology   Standing Status: Future   Referral Priority: Urgent Referral Type: Consultation   Referral Reason: Specialty Services Required   Requested Specialty: Gynecologic Oncology   Number of Visits Requested: 1       Significant Diagnostic Studies: N/A    Pending Diagnostic Studies:       None           Medications:  Reconciled Home Medications:      Medication List        START taking these medications      cefpodoxime 100 MG tablet  Commonly  known as: VANTIN  Take 2 tablets (200 mg total) by mouth every 12 (twelve) hours. for 9 days  Start taking on: March 20, 2024     metroNIDAZOLE 500 MG tablet  Commonly known as: FLAGYL  Take 1 tablet (500 mg total) by mouth every 8 (eight) hours. for 9 days  Start taking on: March 20, 2024            CONTINUE taking these medications      acetaminophen 500 MG tablet  Commonly known as: TYLENOL  Take 1,000 mg by mouth every 6 (six) hours as needed for Pain.            STOP taking these medications      amLODIPine 10 MG tablet  Commonly known as: NORVASC     losartan-hydrochlorothiazide 100-25 mg 100-25 mg per tablet  Commonly known as: HYZAAR              Indwelling Lines/Drains at time of discharge:   Lines/Drains/Airways       Drain  Duration             Female External Urinary Catheter w/ Suction 03/14/24 1335 5 days                    Time spent on the discharge of patient: 45 minutes         Zbigniew Jim MD  Department of Hospital Medicine  [facility] Surg

## 2024-03-19 NOTE — MEDICAL/APP STUDENT
[facility]  In-Patient Hospital Progress Note  (Medical Student Note)    DATE   03/19/2024 1:17 PM    PATIENT INFORMATION   Name: Ms.Lucia Gilmore  Medical Record Number: [id number]   YOB: 1955  Age: 68 y.o.  Length of Hospital Stay: 5  Code Status: FULL    HPI:   Ms.Lucia Gilmore is a 68 y.o. female recently diagnosed with focally necrotic high grade endometrial adenocarcinoma due to post menopausal bleeding with PMH of HTN. Presents to the ER with fever of 103.1 and chills, reports one day of symptoms. Denies chest pain, shortness of breath, abdominal pain, urinary      Chief Complaint: Severe sepsis    SUBJECTIVE:   Ms.Lucia Gilmore is a 68 y.o. female who  has a past medical history of Adenocarcinoma of endometrium and Essential (primary) hypertension..     Patient is currently hospitalized due to Severe sepsis.    This morning, patient reports feeling ***. Specifically, *** is {BETTER/WORSE/SAME AS/:88467}.     She also reports ***. She denies ***.     Voiding: {YES:93785}   Bowel Movements: {YES:66666}   Drinking: {YES:17325}   Eating: {YES:83386}   Has appetite to eat: {YES:71363}   Bed to chair, chair to bed: {YES:22829}   Ambulating: {YES:03276}     Allergies:  Review of patient's allergies indicates:  Not on File    Current Medications:  Current Facility-Administered Medications   Medication Dose Route Frequency Provider Last Rate Last Admin    acetaminophen tablet 650 mg  650 mg Oral Q8H PRN Zbigniew Jim MD   650 mg at 03/19/24 0818    cefTRIAXone (ROCEPHIN) 2 g in dextrose 5 % in water (D5W) 100 mL IVPB (MB+)  2 g Intravenous Q24H Shellie Murillo  mL/hr at 03/19/24 1249 2 g at 03/19/24 1249    dextrose 10% bolus 125 mL 125 mL  12.5 g Intravenous PRN Zbigniew Jim MD        dextrose 10% bolus 250 mL 250 mL  25 g Intravenous PRN Zbigniew Jim MD        glucagon (human recombinant) injection 1 mg  1 mg Intramuscular PRN Zbigniew Jim MD         glucose chewable tablet 16 g  16 g Oral PRZbigniew Umanzor MD        glucose chewable tablet 24 g  24 g Oral PRN Zbigniew Jim MD        melatonin tablet 6 mg  6 mg Oral Nightly Zbigniew Jim MD   6 mg at 03/18/24 2109    metronidazole IVPB 500 mg  500 mg Intravenous Q8H Taya Garcia  mL/hr at 03/19/24 1248 500 mg at 03/19/24 1248    naloxone 0.4 mg/mL injection 0.02 mg  0.02 mg Intravenous PRN Zbigniew Jim MD        sodium chloride 0.9% flush 10 mL  10 mL Intravenous Q12H Zbigniew Duran MD           Reviewed past medical and surgical history.  Active Ambulatory Problems     Diagnosis Date Noted    No Active Ambulatory Problems     Resolved Ambulatory Problems     Diagnosis Date Noted    No Resolved Ambulatory Problems     Past Medical History:   Diagnosis Date    Adenocarcinoma of endometrium     Essential (primary) hypertension         She has no past surgical history on file.    Reviewed family history.   Her family history is not on file.     Reviewed social history.   Social History     Socioeconomic History    Marital status: Single   Tobacco Use    Smoking status: Never    Smokeless tobacco: Never   Substance and Sexual Activity    Alcohol use: Never    Drug use: Never    Sexual activity: Not Currently     Social Determinants of Health     Financial Resource Strain: Low Risk  (3/17/2024)    Overall Financial Resource Strain (CARDIA)     Difficulty of Paying Living Expenses: Not very hard   Food Insecurity: No Food Insecurity (3/17/2024)    Hunger Vital Sign     Worried About Running Out of Food in the Last Year: Never true     Ran Out of Food in the Last Year: Never true   Transportation Needs: No Transportation Needs (3/17/2024)    PRAPARE - Transportation     Lack of Transportation (Medical): No     Lack of Transportation (Non-Medical): No   Physical Activity: Inactive (3/17/2024)    Exercise Vital Sign     Days of Exercise per Week: 0 days     Minutes of  "Exercise per Session: 0 min   Stress: No Stress Concern Present (3/17/2024)    [employer] - Occupational Stress Questionnaire     Feeling of Stress : Not at all   Social Connections: Moderately Integrated (3/17/2024)    Social Connection and Isolation Panel [NHANES]     Frequency of Communication with Friends and Family: More than three times a week     Frequency of Social Gatherings with Friends and Family: More than three times a week     Attends Yazidi Services: More than 4 times per year     Active Member of Clubs or Organizations: No     Attends Club or Organization Meetings: Never     Marital Status:    Housing Stability: Low Risk  (3/17/2024)    Housing Stability Vital Sign     Unable to Pay for Housing in the Last Year: No     Number of Places Lived in the Last Year: 2     Unstable Housing in the Last Year: No        Review of Systems:  Review of Systems    OBJECTIVE:   Vital Signs (Most Recent)  /75   Pulse 69   Temp 98.5 °F (36.9 °C) (Oral)   Resp 16   Ht 5' 5" (1.651 m)   Wt 81.6 kg (180 lb)   SpO2 96%   BMI 29.95 kg/m²     Vital Signs (over last 24 hours)  Temp:  [96.4 °F (35.8 °C)-98.5 °F (36.9 °C)]   Pulse:  [64-75]   Resp:  [16-18]   BP: (116-149)/(62-75)   SpO2:  [96 %-98 %]   Temp (24hrs), Av.8 °F (36.6 °C), Min:96.4 °F (35.8 °C), Max:98.5 °F (36.9 °C)      Body Habitus:  5' 5" (1.651 m)  81.6 kg (180 lb)   Body mass index is 29.95 kg/m².    Oxygen Status: ***    Physical Exam:  Physical Exam    Laboratory Results:  No results for input(s): "WBC", "HGB", "HCT", "MCV", "PLT", "PH", "ANIONGAP", "GLUCOSE", "NA", "K", "MG", "CL", "CO2", "BUN", "CREATININE", "CALCIUM", "HCO3", "CPK", "MB", "PT", "INR", "APTT", "ALT", "AST", "ALKPHOS", "BILITOT", "PROT", "ALBUMIN" in the last 48 hours.     Hemoglobin A1c:  Hemoglobin A1C   Date Value Ref Range Status   03/15/2024 5.7 (H) 4.0 - 5.6 % Final     Comment:     ADA Screening " "Guidelines:  5.7-6.4%  Consistent with prediabetes  >or=6.5%  Consistent with diabetes    High levels of fetal hemoglobin interfere with the HbA1C  assay. Heterozygous hemoglobin variants (HbS, HgC, etc)do  not significantly interfere with this assay.   However, presence of multiple variants may affect accuracy.           Imaging/Pathology Results:  ***    ASSESSMENT and PLAN:   Problem List:  Patient Active Problem List    Diagnosis Date Noted    Pulmonary nodules 03/16/2024    Endometrial adenocarcinoma 03/14/2024    Severe sepsis 03/14/2024    Normocytic anemia due to blood loss 03/14/2024    Hyperglycemia 03/14/2024        Assessment:   - Ms.Lucia Gilmore is 68 y.o. female is hospitalized due Severe sepsis.  - Patient mood is *** with *** thought content and judgement.   - Patient is concerned regarding ***.   - Patient's goal: ***  - Patient's expectations are in accordance with the plan for management.     Plan:  1. ***    2. ***    3. ***    DVT/VTE Prophylaxis: ***    PT/OT: ***    Discharge Plan:   - ***  - Upon discharge, Ms.Lucia Gilmore should follow-up with *** {display:52575:o:"in *** weeks","in *** months","in the future on an as-needed basis"}.    Patient Education:  - Diet:  Patient has been advised to follow a diet emphasizing vegetables, fruits, whole grains, low-fat dairy products, fish, legumes and nuts. Patient has been advised to limit sodium, sweets and red meat.   - Exercise: Patient has been advised that regular physical activity reduces health risk, positively impacts risk factors and aids in recovery. Patients who are able should engage in mild-to-moderate-intensity aerobic for a minimum of 20 minutes 3-4 times a week. For patients with deficits that impair their ability to exercise, a supervised exercise program can be beneficial.  - Smoking: Patient has been advised that counseling with or without drug therapy is available to help quit smoking.  - Sleep Apnea: Patient has been " advised that a referral for testing is available, and that treatment with positive airway pressure may be beneficial.  - [place] Physician Orders for Scope of Treatment (LaPOST): A LaPOST is a document that details a patient's goals of care and treatment preferences. The document should be completed after a thorough discussion with your Primary Care Provider and family members. Completion of a LaPOST document encourages communication between doctors and a patient, enables a patient to make informed decisions, and clearly documents these decisions for future healthcare providers. A LaPOST document can help ensure that a patient's wishes are honored and reduce patient and family stress regarding decision-making.     I spent *** minutes on this visit, including face-to-face with the patient, chart review, and updating medical, surgical, social, and family history.  All questions were directed to the medical team and answered.     --  Joao Webb    Medical Student

## 2024-03-19 NOTE — DISCHARGE SUMMARY
[facility]  Discharge Summary      Patient Name: Cherri Gilmore  MRN: [id number]  AL: [id number]  Patient Class: IP- Inpatient  Admission Date: 3/14/2024  Hospital Length of Stay: 5 days  Discharge Date and Time:  03/19/2024 3:02 PM  Attending Physician: Shellie Murillo*   Discharging Provider: Zbigniew Jim MD  Primary Care Provider: [facility]  [facility] Medicine Team: [facility] 3 Zbigniew Jim MD  Primary Care Team: [facility] 3    HPI:   68 year old F with PMH of post menopausal bleeding, recent pathology report noting focally necrotic high grade endometrial adenocarcinoma, presenting for fever of 103.1 and chills. Reports that yesterday started developing fever and chills and posterior head pain which she feels was a result of the rigors. Head pain has since resolved. Pt denies chest pain, sob, abdominal pain, n/v/d, constipation, fever, chills, or weakness of the extremities. Discussed cancer diagnosis with patient and stressed importance of follow up with GYN. Admitted to  for septic shock.    On presentation to [facility], the patient was alert, oriented, no complaints.  VS Normotensive, Febrile with temperature of 103.1, and tachycardic at 145 .  Initial labs notable for hgb 10.1, k 3.4, bun/cr of 24/1.9, tbili 1.2,  ALT 78, . CXR clear, pending CT head and abdomen. Urinalysis does not appear infectious. Therapy/stabilization measures were started, notable for Ceftriaxone and 2.5L fluid bolus.      * No surgery found *      Hospital Course:   Patient admitted to  with sepsis. Initially treated with BSABX, later transitioned to IV Flagyl and Ceftriaxone. Admission blood cultures grew Bacteroides in one set and GPRs in the second set (awaiting speciation). Repeat cultures drawn which were negative. Suspect bacteremia secondary to necrotic tumor. CT chest showed scattered solid pulmonary micronodules (up to 3mm) but no overt evidence of  metastases. Patient HDS and feeling well at this time. Will urgently refer patient to [facility] on discharge. Will be discharged with PO flagyl and cefpodoxime for a total of 14 days of antibiotics.     Goals of Care Treatment Preferences:  Code Status: Full Code      Consults:   Consults (From admission, onward)          Status Ordering Provider     Inpatient consult to Gynecologic Oncology  Once        Provider:  (Not yet assigned)    MINISTERIO Bolden            No new Assessment & Plan notes have been filed under this hospital service since the last note was generated.  Service: Hospital Medicine    Final Active Diagnoses:    Diagnosis Date Noted POA    PRINCIPAL PROBLEM:  Severe sepsis [A41.9, R65.20] 03/14/2024 Yes    Pulmonary nodules [R91.8] 03/16/2024 Yes    Endometrial adenocarcinoma [C54.1] 03/14/2024 Yes    Normocytic anemia due to blood loss [D50.0] 03/14/2024 Yes    Hyperglycemia [R73.9] 03/14/2024 Yes      Problems Resolved During this Admission:    Diagnosis Date Noted Date Resolved POA    Hypokalemia [E87.6] 03/14/2024 03/17/2024 Yes    Metabolic acidosis, normal anion gap (NAG) [E87.20] 03/14/2024 03/17/2024 Yes    ROBERTO (acute kidney injury) [N17.9] 03/14/2024 03/17/2024 Yes    Transaminitis [R74.01] 03/14/2024 03/17/2024 Yes       Discharged Condition: stable    Disposition:     Follow Up:   Follow-up Information       No, Primary Doctor Follow up.                           Patient Instructions:      Ambulatory referral/consult to Gynecologic Oncology   Standing Status: Future   Referral Priority: Urgent Referral Type: Consultation   Referral Reason: Specialty Services Required   Requested Specialty: Gynecologic Oncology   Number of Visits Requested: 1     Ambulatory referral/consult to Internal Medicine   Standing Status: Future   Referral Priority: Routine Referral Type: Consultation   Referral Reason: Specialty Services Required   Requested Specialty: Internal Medicine   Number of  Visits Requested: 1       Significant Diagnostic Studies: N/A    Pending Diagnostic Studies:       None           Medications:  Reconciled Home Medications:      Medication List        START taking these medications      cefpodoxime 100 MG tablet  Commonly known as: VANTIN  Take 2 tablets (200 mg total) by mouth every 12 (twelve) hours. for 9 days  Start taking on: March 20, 2024     metroNIDAZOLE 500 MG tablet  Commonly known as: FLAGYL  Take 1 tablet (500 mg total) by mouth every 8 (eight) hours. for 9 days  Start taking on: March 20, 2024            CONTINUE taking these medications      acetaminophen 500 MG tablet  Commonly known as: TYLENOL  Take 1,000 mg by mouth every 6 (six) hours as needed for Pain.            STOP taking these medications      amLODIPine 10 MG tablet  Commonly known as: NORVASC     losartan-hydrochlorothiazide 100-25 mg 100-25 mg per tablet  Commonly known as: HYZAAR              Indwelling Lines/Drains at time of discharge:   Lines/Drains/Airways       Drain  Duration             Female External Urinary Catheter w/ Suction 03/14/24 1335 5 days                    Time spent on the discharge of patient: 45 minutes         Zbigniew Jim MD  Department of Hospital Medicine  [facility]

## 2024-03-19 NOTE — PLAN OF CARE
Problem: Adult Inpatient Plan of Care  Goal: Plan of Care Review  Outcome: Met  Goal: Patient-Specific Goal (Individualized)  Outcome: Met  Goal: Absence of Hospital-Acquired Illness or Injury  Outcome: Met  Goal: Optimal Comfort and Wellbeing  Outcome: Met  Goal: Readiness for Transition of Care  Outcome: Met     Problem: Adjustment to Illness (Sepsis/Septic Shock)  Goal: Optimal Coping  Outcome: Met     Problem: Bleeding (Sepsis/Septic Shock)  Goal: Absence of Bleeding  Outcome: Met     Problem: Glycemic Control Impaired (Sepsis/Septic Shock)  Goal: Blood Glucose Level Within Desired Range  Outcome: Met     Problem: Infection Progression (Sepsis/Septic Shock)  Goal: Absence of Infection Signs and Symptoms  Outcome: Met     Problem: Nutrition Impaired (Sepsis/Septic Shock)  Goal: Optimal Nutrition Intake  Outcome: Met     Problem: Fluid and Electrolyte Imbalance (Acute Kidney Injury/Impairment)  Goal: Fluid and Electrolyte Balance  Outcome: Met     Problem: Oral Intake Inadequate (Acute Kidney Injury/Impairment)  Goal: Optimal Nutrition Intake  Outcome: Met     Problem: Renal Function Impairment (Acute Kidney Injury/Impairment)  Goal: Effective Renal Function  Outcome: Met  D/C instructions reviewed, questions answered and copy of instructions in [place] given to patient who verbalized understanding. D/C medications delivered to bedside.  Patient's son is in route to pick patient up.

## 2024-03-19 NOTE — PLAN OF CARE
Problem: Adult Inpatient Plan of Care  Goal: Plan of Care Review  Outcome: Ongoing, Progressing  Flowsheets (Taken 3/19/2024 0641)  Plan of Care Reviewed With: patient  Goal: Optimal Comfort and Wellbeing  Outcome: Ongoing, Progressing  Intervention: Provide Person-Centered Care  Flowsheets (Taken 3/19/2024 0641)  Trust Relationship/Rapport:   care explained   emotional support provided   thoughts/feelings acknowledged   reassurance provided

## 2024-03-19 NOTE — PLAN OF CARE
Pt medically ready for d/c.  Pt stated her son will provide transportation home.   No other needs identified.      Discharge Plan A and Plan B have been determined by review of patient's clinical status, future medical and therapeutic needs, and coverage/benefits for post-acute care in coordination with multidisciplinary team members.     03/19/24 1619   Post-Acute Status   Post-Acute Authorization Other   Coverage [employer] Managed Medicare   Other Status No Post-Acute Service Needs   Discharge Delays None known at this time   Discharge Plan   Discharge Plan A Home;Home with family   Discharge Plan B Home       Alejandra Roach LMSW  Part-Time-  [facility]  Ext. [phone]

## 2024-03-19 NOTE — DISCHARGE INSTRUCTIONS
You will need to take 9 more days of antibiotics for a total of 14 days. Please make sure to urgently see Gyn Onc for treatment of your endometrial tumor. We have provided a urgent referral to gyn onc and a PCP to follow up with your results.

## 2024-03-20 ENCOUNTER — TELEPHONE (OUTPATIENT)
Dept: GYNECOLOGIC ONCOLOGY | Facility: CLINIC | Age: 69
End: 2024-03-20
Payer: MEDICARE

## 2024-03-20 NOTE — PHYSICIAN QUERY
"PT Name: Cherri Gilmore  MR #: [id number]     DOCUMENTATION CLARIFICATION      CDS/: Mary Beth Oliver RN CDI       Contact information: [email or web address]  This form is a permanent document in the medical record.    Query Date: March 20, 2024    By submitting this query, we are merely seeking further clarification of documentation. Please utilize your independent clinical judgment when addressing the question(s) below.     The Medical Record contains the following:   Indicators   Supporting Clinical Findings Location in Medical Record   x Documentation of Sepsis, Septic Shock Severe sepsis  source unknown       Admission blood cultures grew Bacteroides in one set and GPRs in the second set (awaiting speciation). Repeat cultures drawn. Suspect bacteremia secondary to necrotic tumor      H&P D Talia ROLDAN/   PIPER Murillo MD    ed 3/18 D Talia ROLDAN/ PIPER Murillo MD   x Blood Culture 3/14  Bacteroides Fragilis, Trueperella Bernardiae   Lab      Respiratory Culture      Urine Culture      Other Culture     x Acute/Chronic Illness Severe Sepsis  ROBERTO  Acute liver injury  Pulmonary nodules  Hyperglycemia w/o hx of diabetes  Endometrial adenocarcinoma   ed 3/18 D Talia ROLDAN/ PIPER Murillo MD   x Medication/Treatment Will change abx to metronidazole on DC. Will refer patient to [facility] on discharge.  [facility] 3/16 D Talia ROLDAN/ PIPER Murillo MD    Other        Provider, please further specify the  "Sepsis" diagnosis:   [ x  ] Due to  Bacteroides Fragilis     [ x  ] Due to Trueperella Bernardiae       [   ] Due to (please specify): _____________________________     [   ] Other specification (please specify): ____________________     [   ] Clinically Undetermined       Please document in your progress notes daily for the duration of treatment until resolved, and include in your discharge summary.    Form No. [id number]    "

## 2024-03-20 NOTE — TELEPHONE ENCOUNTER
Left voicemail with  on Dwight on the line asking that patient call our office back to schedule and that we have open appointment today if needed. ----- Message from Gali Pimentel sent at 3/19/2024  9:49 AM CDT -----  Regarding: new patient appt  Type:  Patient  Call      Name of who is calling:Cherri        What is request in detail:patient is requesting a call back, she is a new patient and would like to set up appt        Can clinic reply by [facility]:no        What number to call back if not in [facility]: [phone]

## 2024-03-20 NOTE — TELEPHONE ENCOUNTER
Spoke with our patient and [facility]  about her schedule appointment. ----- Message from Gali Pimentel sent at 3/19/2024  9:49 AM CDT -----  Regarding: new patient appt  Type:  Patient  Call      Name of who is calling:Cherri        What is request in detail:patient is requesting a call back, she is a new patient and would like to set up appt        Can clinic reply by [facility]:no        What number to call back if not in [facility]: [phone]

## 2024-03-20 NOTE — PLAN OF CARE
[facility] Med Surg  Discharge Final Note    Primary Care Provider: [facility]    Expected Discharge Date: 3/19/2024    Pt discharged home with no needs.  Pt's son provided transportation home.      Per [facility]- Gyn/Onc will contact patient to schedule follow up appointment.      Discharge Plan A and Plan B have been determined by review of patient's clinical status, future medical and therapeutic needs, and coverage/benefits for post-acute care in coordination with multidisciplinary team members.    Final Discharge Note (most recent)       Final Note - 03/20/24 0903          Final Note    Assessment Type Final Discharge Note     Anticipated Discharge Disposition Home or Self Care        Post-Acute Status    Post-Acute Authorization Other     Coverage [employer] Managed Medicare     Other Status No Post-Acute Service Needs     Discharge Delays None known at this time                     Important Message from Medicare  Important Message from Medicare regarding Discharge Appeal Rights: Given to patient/caregiver, Signed/date by patient/caregiver, Explained to patient/caregiver     Date IMM was signed: 03/18/24  Time IMM was signed: 1106    Contact Info       [facility]   Relationship: PCP - General        Next Steps: Follow up          Alejandra Roach LMSW  Part-Time-  [facility]  Ext. [phone]

## 2024-03-22 ENCOUNTER — TELEPHONE (OUTPATIENT)
Dept: GYNECOLOGIC ONCOLOGY | Facility: CLINIC | Age: 69
End: 2024-03-22
Payer: MEDICARE

## 2024-03-22 LAB
BACTERIA BLD CULT: NORMAL
BACTERIA BLD CULT: NORMAL

## 2024-03-22 NOTE — TELEPHONE ENCOUNTER
Pt aware of appt change 03/27/24 10:45 am with Dr. Briscoe at [facility] gyn-onc location.       Left vm via  John. Left vm informing pt of appt change from 3/25 to 4/9 with Dr. Morena Briscoe, and to contact clinic at [phone]. Yolanda Quintanilla also provided language services phone number.

## 2024-03-27 ENCOUNTER — TELEPHONE (OUTPATIENT)
Dept: GYNECOLOGIC ONCOLOGY | Facility: CLINIC | Age: 69
End: 2024-03-27
Payer: MEDICARE

## 2024-03-27 ENCOUNTER — OFFICE VISIT (OUTPATIENT)
Dept: GYNECOLOGIC ONCOLOGY | Facility: CLINIC | Age: 69
End: 2024-03-27
Payer: MEDICARE

## 2024-03-27 VITALS
SYSTOLIC BLOOD PRESSURE: 169 MMHG | DIASTOLIC BLOOD PRESSURE: 79 MMHG | BODY MASS INDEX: 35 KG/M2 | HEART RATE: 86 BPM | HEIGHT: 67 IN | WEIGHT: 223 LBS

## 2024-03-27 DIAGNOSIS — C54.1 ENDOMETRIAL ADENOCARCINOMA: ICD-10-CM

## 2024-03-27 DIAGNOSIS — C54.1 ENDOMETRIAL ADENOCARCINOMA: Primary | ICD-10-CM

## 2024-03-27 PROCEDURE — 1111F DSCHRG MED/CURRENT MED MERGE: CPT | Mod: CPTII,S$GLB,, | Performed by: OBSTETRICS & GYNECOLOGY

## 2024-03-27 PROCEDURE — 1159F MED LIST DOCD IN RCRD: CPT | Mod: CPTII,S$GLB,, | Performed by: OBSTETRICS & GYNECOLOGY

## 2024-03-27 PROCEDURE — 1125F AMNT PAIN NOTED PAIN PRSNT: CPT | Mod: CPTII,S$GLB,, | Performed by: OBSTETRICS & GYNECOLOGY

## 2024-03-27 PROCEDURE — 99205 OFFICE O/P NEW HI 60 MIN: CPT | Mod: S$GLB,,, | Performed by: OBSTETRICS & GYNECOLOGY

## 2024-03-27 PROCEDURE — 99999 PR PBB SHADOW E&M-EST. PATIENT-LVL III: CPT | Mod: PBBFAC,,, | Performed by: OBSTETRICS & GYNECOLOGY

## 2024-03-27 PROCEDURE — 1101F PT FALLS ASSESS-DOCD LE1/YR: CPT | Mod: CPTII,S$GLB,, | Performed by: OBSTETRICS & GYNECOLOGY

## 2024-03-27 PROCEDURE — 3288F FALL RISK ASSESSMENT DOCD: CPT | Mod: CPTII,S$GLB,, | Performed by: OBSTETRICS & GYNECOLOGY

## 2024-03-27 PROCEDURE — 3008F BODY MASS INDEX DOCD: CPT | Mod: CPTII,S$GLB,, | Performed by: OBSTETRICS & GYNECOLOGY

## 2024-03-27 PROCEDURE — 3044F HG A1C LEVEL LT 7.0%: CPT | Mod: CPTII,S$GLB,, | Performed by: OBSTETRICS & GYNECOLOGY

## 2024-03-27 PROCEDURE — 3077F SYST BP >= 140 MM HG: CPT | Mod: CPTII,S$GLB,, | Performed by: OBSTETRICS & GYNECOLOGY

## 2024-03-27 PROCEDURE — 3078F DIAST BP <80 MM HG: CPT | Mod: CPTII,S$GLB,, | Performed by: OBSTETRICS & GYNECOLOGY

## 2024-03-27 RX ORDER — HEPARIN SODIUM 5000 [USP'U]/ML
5000 INJECTION, SOLUTION INTRAVENOUS; SUBCUTANEOUS ONCE
Status: CANCELLED | OUTPATIENT
Start: 2024-04-12

## 2024-03-27 RX ORDER — CEFAZOLIN SODIUM 2 G/50ML
2 SOLUTION INTRAVENOUS
Status: CANCELLED | OUTPATIENT
Start: 2024-03-27

## 2024-03-27 RX ORDER — FAMOTIDINE 20 MG/1
20 TABLET, FILM COATED ORAL
Status: CANCELLED | OUTPATIENT
Start: 2024-03-27

## 2024-03-27 RX ORDER — SODIUM CHLORIDE 9 MG/ML
INJECTION, SOLUTION INTRAVENOUS CONTINUOUS
Status: CANCELLED | OUTPATIENT
Start: 2024-03-27

## 2024-03-27 RX ORDER — MUPIROCIN 20 MG/G
OINTMENT TOPICAL
Status: CANCELLED | OUTPATIENT
Start: 2024-03-27

## 2024-03-27 NOTE — H&P (VIEW-ONLY)
"Subjective:      Patient ID: Cherri Gilmore is a 69 y.o. female.    Chief Complaint: Consult (New patient )      HPI  Presents today for hospital follow up and discussions surrounding newly diagnosed endometrial cancer.     Patient reported PMB and had EMBx on 3/7/2024 at OSH. Biopsy was consistent with high grade endometrial adenocarcinoma, FIGO grade 3. No features suggestive of serous morphology, although mixed mullerian tumor could not be excluded. Tumor positive for "vimentin, pankeratin, WT1 (predominantly cytoplasmic positivity), PAX8, and p16. There is patchy strong positivity for p63. Estrogen receptor positivity is 1-2%. Although there is strong positivity for p53, the pattern is mixed and appears to be non mutated. The Ki-67 proliferation index is markedly increased (focally up to 80%). There is negative staining for progesterone receptor, CEA-M, CD10, and Napsin A."     CT CAP 3/14/2024 with enlarged uterus, however no obvious evidence of metastatic disease.     Prior abdominal surgeries include BTL and cholecystectomy.  x 5.     Recently admitted for sepsis 3/14 of unclear source. Recovered with antibiotics therapy and resuscitation.     Review of Systems   Constitutional:  Negative for appetite change, chills, fatigue and fever.   HENT:  Negative for mouth sores.    Respiratory:  Negative for cough and shortness of breath.    Cardiovascular:  Negative for leg swelling.   Gastrointestinal:  Negative for abdominal pain, blood in stool, constipation and diarrhea.   Endocrine: Negative for cold intolerance.   Genitourinary:  Negative for dysuria and vaginal bleeding.   Musculoskeletal:  Negative for myalgias.   Skin:  Negative for rash.   Allergic/Immunologic: Negative.    Neurological:  Negative for weakness and numbness.   Hematological:  Negative for adenopathy. Does not bruise/bleed easily.   Psychiatric/Behavioral:  Negative for confusion.        Past Medical History:   Diagnosis Date    " Adenocarcinoma of endometrium     Essential (primary) hypertension      Past Surgical History:   Procedure Laterality Date    DILATION AND CURETTAGE OF UTERUS      TUBAL LIGATION       Family History   Problem Relation Age of Onset    Hypertension Mother     Diabetes Mother     Stroke Father     Breast cancer Sister     Stomach cancer Brother      Social History     Socioeconomic History    Marital status: Single   Tobacco Use    Smoking status: Never    Smokeless tobacco: Never   Substance and Sexual Activity    Alcohol use: Never    Drug use: Never    Sexual activity: Not Currently     Social Determinants of Health     Financial Resource Strain: Low Risk  (3/17/2024)    Overall Financial Resource Strain (CARDIA)     Difficulty of Paying Living Expenses: Not very hard   Food Insecurity: No Food Insecurity (3/17/2024)    Hunger Vital Sign     Worried About Running Out of Food in the Last Year: Never true     Ran Out of Food in the Last Year: Never true   Transportation Needs: No Transportation Needs (3/17/2024)    PRAPARE - Transportation     Lack of Transportation (Medical): No     Lack of Transportation (Non-Medical): No   Physical Activity: Inactive (3/17/2024)    Exercise Vital Sign     Days of Exercise per Week: 0 days     Minutes of Exercise per Session: 0 min   Stress: No Stress Concern Present (3/17/2024)    [employer] - Occupational Stress Questionnaire     Feeling of Stress : Not at all   Social Connections: Moderately Integrated (3/17/2024)    Social Connection and Isolation Panel [NHANES]     Frequency of Communication with Friends and Family: More than three times a week     Frequency of Social Gatherings with Friends and Family: More than three times a week     Attends Rastafarian Services: More than 4 times per year     Active Member of Clubs or Organizations: No     Attends Club or Organization Meetings: Never     Marital Status:    Housing Stability: Low Risk   (3/17/2024)    Housing Stability Vital Sign     Unable to Pay for Housing in the Last Year: No     Number of Places Lived in the Last Year: 2     Unstable Housing in the Last Year: No     Current Outpatient Medications   Medication Sig    acetaminophen (TYLENOL) 500 MG tablet Take 1,000 mg by mouth every 6 (six) hours as needed for Pain.    cefpodoxime (VANTIN) 100 MG tablet Take 2 tablets (200 mg total) by mouth every 12 (twelve) hours. for 9 days    metroNIDAZOLE (FLAGYL) 500 MG tablet Take 1 tablet (500 mg total) by mouth every 8 (eight) hours. for 9 days     No current facility-administered medications for this visit.     Review of patient's allergies indicates:  No Known Allergies    Objective:   Physical Exam:   Constitutional: She is oriented to person, place, and time. She appears well-developed and well-nourished.    HENT:   Head: Normocephalic and atraumatic.    Eyes: Pupils are equal, round, and reactive to light. EOM are normal.    Neck: No thyromegaly present.    Cardiovascular:  Normal rate, regular rhythm and intact distal pulses.             Pulmonary/Chest: Effort normal and breath sounds normal. No respiratory distress. She has no wheezes.        Abdominal: Soft. Bowel sounds are normal. She exhibits no distension and no mass. There is no abdominal tenderness.             Musculoskeletal: Normal range of motion and moves all extremeties.      Lymphadenopathy:     She has no cervical adenopathy.        Right: No supraclavicular adenopathy present.        Left: No supraclavicular adenopathy present.    Neurological: She is alert and oriented to person, place, and time.    Skin: Skin is warm and dry. No rash noted.    Psychiatric: She has a normal mood and affect.       Assessment:     1. Endometrial adenocarcinoma        Plan:   No orders of the defined types were placed in this encounter.        I had an extensive conversation with the patient and her son who was present with her at today's giving a  broad overview of endometrial cancer to include epidemiology, risk factors, clinical features, diagnosis, as well as staging and surgical treatment.  I have recommended robotic-assisted hysterectomy, bilateral salpingo-oophorectomy and sentinel lymph node mapping and biopsy.  Based on the data from surgery we will determine whether adjuvant therapy would be recommended.  We will also have a better-informed discussion about prognosis.     The risks, benefits, and indications of the procedure were discussed with the patient and her family members if present.  These included bleeding, transfusion, infection, damage to surrounding tissues (bowel, bladder, ureter), wound separation, lymphedema, conversion to laparotomy if laparoscopic, perioperative cardiac events, VTE, pneumonia, and possible death.  She voiced understanding, all questions were answered and consents were signed.    Surgery 4/12/2024    I spent approximately 60 minutes reviewing the available records and evaluating the patient, out of which over 50% of the time was spent face to face with the patient in counseling and coordinating this patient's care.

## 2024-03-27 NOTE — PROGRESS NOTES
"Subjective:      Patient ID: Cherri Gilmore is a 69 y.o. female.    Chief Complaint: Consult (New patient )      HPI  Presents today for hospital follow up and discussions surrounding newly diagnosed endometrial cancer.     Patient reported PMB and had EMBx on 3/7/2024 at OSH. Biopsy was consistent with high grade endometrial adenocarcinoma, FIGO grade 3. No features suggestive of serous morphology, although mixed mullerian tumor could not be excluded. Tumor positive for "vimentin, pankeratin, WT1 (predominantly cytoplasmic positivity), PAX8, and p16. There is patchy strong positivity for p63. Estrogen receptor positivity is 1-2%. Although there is strong positivity for p53, the pattern is mixed and appears to be non mutated. The Ki-67 proliferation index is markedly increased (focally up to 80%). There is negative staining for progesterone receptor, CEA-M, CD10, and Napsin A."     CT CAP 3/14/2024 with enlarged uterus, however no obvious evidence of metastatic disease.     Prior abdominal surgeries include BTL and cholecystectomy.  x 5.     Recently admitted for sepsis 3/14 of unclear source. Recovered with antibiotics therapy and resuscitation.     Review of Systems   Constitutional:  Negative for appetite change, chills, fatigue and fever.   HENT:  Negative for mouth sores.    Respiratory:  Negative for cough and shortness of breath.    Cardiovascular:  Negative for leg swelling.   Gastrointestinal:  Negative for abdominal pain, blood in stool, constipation and diarrhea.   Endocrine: Negative for cold intolerance.   Genitourinary:  Negative for dysuria and vaginal bleeding.   Musculoskeletal:  Negative for myalgias.   Skin:  Negative for rash.   Allergic/Immunologic: Negative.    Neurological:  Negative for weakness and numbness.   Hematological:  Negative for adenopathy. Does not bruise/bleed easily.   Psychiatric/Behavioral:  Negative for confusion.        Past Medical History:   Diagnosis Date    " Adenocarcinoma of endometrium     Essential (primary) hypertension      Past Surgical History:   Procedure Laterality Date    DILATION AND CURETTAGE OF UTERUS      TUBAL LIGATION       Family History   Problem Relation Age of Onset    Hypertension Mother     Diabetes Mother     Stroke Father     Breast cancer Sister     Stomach cancer Brother      Social History     Socioeconomic History    Marital status: Single   Tobacco Use    Smoking status: Never    Smokeless tobacco: Never   Substance and Sexual Activity    Alcohol use: Never    Drug use: Never    Sexual activity: Not Currently     Social Determinants of Health     Financial Resource Strain: Low Risk  (3/17/2024)    Overall Financial Resource Strain (CARDIA)     Difficulty of Paying Living Expenses: Not very hard   Food Insecurity: No Food Insecurity (3/17/2024)    Hunger Vital Sign     Worried About Running Out of Food in the Last Year: Never true     Ran Out of Food in the Last Year: Never true   Transportation Needs: No Transportation Needs (3/17/2024)    PRAPARE - Transportation     Lack of Transportation (Medical): No     Lack of Transportation (Non-Medical): No   Physical Activity: Inactive (3/17/2024)    Exercise Vital Sign     Days of Exercise per Week: 0 days     Minutes of Exercise per Session: 0 min   Stress: No Stress Concern Present (3/17/2024)    [employer] - Occupational Stress Questionnaire     Feeling of Stress : Not at all   Social Connections: Moderately Integrated (3/17/2024)    Social Connection and Isolation Panel [NHANES]     Frequency of Communication with Friends and Family: More than three times a week     Frequency of Social Gatherings with Friends and Family: More than three times a week     Attends Shinto Services: More than 4 times per year     Active Member of Clubs or Organizations: No     Attends Club or Organization Meetings: Never     Marital Status:    Housing Stability: Low Risk   (3/17/2024)    Housing Stability Vital Sign     Unable to Pay for Housing in the Last Year: No     Number of Places Lived in the Last Year: 2     Unstable Housing in the Last Year: No     Current Outpatient Medications   Medication Sig    acetaminophen (TYLENOL) 500 MG tablet Take 1,000 mg by mouth every 6 (six) hours as needed for Pain.    cefpodoxime (VANTIN) 100 MG tablet Take 2 tablets (200 mg total) by mouth every 12 (twelve) hours. for 9 days    metroNIDAZOLE (FLAGYL) 500 MG tablet Take 1 tablet (500 mg total) by mouth every 8 (eight) hours. for 9 days     No current facility-administered medications for this visit.     Review of patient's allergies indicates:  No Known Allergies    Objective:   Physical Exam:   Constitutional: She is oriented to person, place, and time. She appears well-developed and well-nourished.    HENT:   Head: Normocephalic and atraumatic.    Eyes: Pupils are equal, round, and reactive to light. EOM are normal.    Neck: No thyromegaly present.    Cardiovascular:  Normal rate, regular rhythm and intact distal pulses.             Pulmonary/Chest: Effort normal and breath sounds normal. No respiratory distress. She has no wheezes.        Abdominal: Soft. Bowel sounds are normal. She exhibits no distension and no mass. There is no abdominal tenderness.             Musculoskeletal: Normal range of motion and moves all extremeties.      Lymphadenopathy:     She has no cervical adenopathy.        Right: No supraclavicular adenopathy present.        Left: No supraclavicular adenopathy present.    Neurological: She is alert and oriented to person, place, and time.    Skin: Skin is warm and dry. No rash noted.    Psychiatric: She has a normal mood and affect.       Assessment:     1. Endometrial adenocarcinoma        Plan:   No orders of the defined types were placed in this encounter.        I had an extensive conversation with the patient and her son who was present with her at today's giving a  broad overview of endometrial cancer to include epidemiology, risk factors, clinical features, diagnosis, as well as staging and surgical treatment.  I have recommended robotic-assisted hysterectomy, bilateral salpingo-oophorectomy and sentinel lymph node mapping and biopsy.  Based on the data from surgery we will determine whether adjuvant therapy would be recommended.  We will also have a better-informed discussion about prognosis.     The risks, benefits, and indications of the procedure were discussed with the patient and her family members if present.  These included bleeding, transfusion, infection, damage to surrounding tissues (bowel, bladder, ureter), wound separation, lymphedema, conversion to laparotomy if laparoscopic, perioperative cardiac events, VTE, pneumonia, and possible death.  She voiced understanding, all questions were answered and consents were signed.    Surgery 4/12/2024    I spent approximately 60 minutes reviewing the available records and evaluating the patient, out of which over 50% of the time was spent face to face with the patient in counseling and coordinating this patient's care.

## 2024-04-04 ENCOUNTER — ANESTHESIA EVENT (OUTPATIENT)
Dept: SURGERY | Facility: OTHER | Age: 69
DRG: 741 | End: 2024-04-04
Payer: MEDICARE

## 2024-04-05 ENCOUNTER — HOSPITAL ENCOUNTER (OUTPATIENT)
Dept: PREADMISSION TESTING | Facility: OTHER | Age: 69
Discharge: HOME | End: 2024-04-05
Attending: OBSTETRICS & GYNECOLOGY
Payer: MEDICARE

## 2024-04-05 VITALS
WEIGHT: 220 LBS | SYSTOLIC BLOOD PRESSURE: 176 MMHG | RESPIRATION RATE: 16 BRPM | HEART RATE: 90 BPM | DIASTOLIC BLOOD PRESSURE: 86 MMHG | OXYGEN SATURATION: 100 % | HEIGHT: 67 IN | BODY MASS INDEX: 34.53 KG/M2 | TEMPERATURE: 97 F

## 2024-04-05 DIAGNOSIS — C54.1 ENDOMETRIAL ADENOCARCINOMA: ICD-10-CM

## 2024-04-05 LAB
ABO + RH BLD: NORMAL
BLD GP AB SCN CELLS X3 SERPL QL: NORMAL
SPECIMEN OUTDATE: NORMAL

## 2024-04-05 PROCEDURE — 36415 COLL VENOUS BLD VENIPUNCTURE: CPT | Performed by: OBSTETRICS & GYNECOLOGY

## 2024-04-05 PROCEDURE — 86850 RBC ANTIBODY SCREEN: CPT | Performed by: OBSTETRICS & GYNECOLOGY

## 2024-04-05 RX ORDER — HYDROCHLOROTHIAZIDE 25 MG/1
1 TABLET ORAL DAILY
COMMUNITY
End: 2024-04-05 | Stop reason: CLARIF

## 2024-04-05 RX ORDER — AMLODIPINE BESYLATE 10 MG/1
10 TABLET ORAL DAILY
COMMUNITY

## 2024-04-05 RX ORDER — LOSARTAN POTASSIUM AND HYDROCHLOROTHIAZIDE 25; 100 MG/1; MG/1
1 TABLET ORAL DAILY
COMMUNITY

## 2024-04-05 RX ORDER — LIDOCAINE HYDROCHLORIDE 10 MG/ML
0.5 INJECTION, SOLUTION EPIDURAL; INFILTRATION; INTRACAUDAL; PERINEURAL ONCE
Status: CANCELLED | OUTPATIENT
Start: 2024-04-05 | End: 2024-04-05

## 2024-04-05 RX ORDER — FAMOTIDINE 20 MG/1
20 TABLET, FILM COATED ORAL
Status: CANCELLED | OUTPATIENT
Start: 2024-04-05 | End: 2024-04-05

## 2024-04-05 RX ORDER — AMLODIPINE BESYLATE 10 MG/1
1 TABLET ORAL DAILY
COMMUNITY
End: 2024-04-05 | Stop reason: CLARIF

## 2024-04-05 RX ORDER — SODIUM CHLORIDE, SODIUM LACTATE, POTASSIUM CHLORIDE, CALCIUM CHLORIDE 600; 310; 30; 20 MG/100ML; MG/100ML; MG/100ML; MG/100ML
INJECTION, SOLUTION INTRAVENOUS CONTINUOUS
Status: CANCELLED | OUTPATIENT
Start: 2024-04-05

## 2024-04-05 NOTE — ANESTHESIA PREPROCEDURE EVALUATION
04/05/2024  Cherri Gilmore is a 69 y.o., female.      Pre-op Assessment    I have reviewed the Patient Summary Reports.     I have reviewed the Nursing Notes. I have reviewed the NPO Status.   I have reviewed the Medications.     Review of Systems  Anesthesia Hx:  No problems with previous Anesthesia                Social:  Non-Smoker       Hematology/Oncology:       -- Anemia:                  Current/Recent Cancer.            Oncology Comments: Endometrial ca.     Cardiovascular:     Hypertension, well controlled                                        Pulmonary:  Pulmonary Normal                       Hepatic/GI:  Hepatic/GI Normal                 Neurological:  Neurology Normal                                      Endocrine:        Obesity / BMI > 30  Psych:  Psychiatric Normal                    Physical Exam  General: Cooperative and Alert    Airway:  Mallampati: II   Mouth Opening: Normal  TM Distance: Normal  Tongue: Normal  Neck ROM: Normal ROM    Dental:  Intact        Anesthesia Plan  Type of Anesthesia, risks & benefits discussed:    Anesthesia Type: Gen ETT  Intra-op Monitoring Plan: Standard ASA Monitors  Post Op Pain Control Plan: multimodal analgesia  Induction:  IV  Airway Plan: Video  Informed Consent: Informed consent signed with the Patient and all parties understand the risks and agree with anesthesia plan.  All questions answered.   ASA Score: 3  Anesthesia Plan Notes: Has recent labs. T&S ordered.  Pt was recently in the hospital for sepsis march 15th. Review of path results revealed focally necrotic, high grade endometrial adenocarcinoma (suspect this is the source of her bacteremia).     Ready For Surgery From Anesthesia Perspective.     .

## 2024-04-05 NOTE — DISCHARGE INSTRUCTIONS
Information to Prepare you for your Surgery    PRE-ADMIT TESTING -  [phone]    [address]  [facility]          Your surgery has been scheduled at [facility]. We are pleased to have the opportunity to serve you. For Further Information please call [phone].    On the day of surgery please report to the Information Desk on the 1st floor.    CONTACT YOUR PHYSICIAN'S OFFICE THE DAY PRIOR TO YOUR SURGERY TO OBTAIN YOUR ARRIVAL TIME.     The evening before surgery do not eat anything after 9 p.m. ( this includes hard candy, chewing gum and mints).  You may only have GATORADE, POWERADE AND WATER  from 9 p.m. until you leave your home.   DO NOT DRINK ANY LIQUIDS ON THE WAY TO THE HOSPITAL.      Why does your anesthesiologist allow you to drink Gatorade/Powerade before surgery?  Gatorade/Powerade helps to increase your comfort before surgery and to decrease your nausea after surgery. The carbohydrates in Gatorade/Powerade help reduce your body's stress response to surgery.  If you are a diabetic-drink only water prior to surgery.    Outpatient Surgery- May allow 2 adult (18 and older) Support Persons (1 being the designated ) for all surgical/procedural patients. A breastfeeding mother will be allowed her infant and 2 adult Support Persons. No one under the age of 18 will be allowed in the building. No swapping out of visitors in the [facility].      SPECIAL MEDICATION INSTRUCTIONS: TAKE medications checked off by the Anesthesiologist on your Medication List.    Angiogram Patients: Take medications as instructed by your physician, including aspirin.     Surgery Patients:    If you take ASPIRIN - Your PHYSICIAN/SURGEON will need to inform you IF/OR when you need to stop taking aspirin prior to your surgery.     The week prior to surgery do not ot take any medications containing IBUPROFEN or NSAIDS ( Advil, Motrin, Goodys, BC, Aleve, Naproxen etc)  If you are not sure if you should take a medicine please call your surgeon's office.  Ok to take Tylenol    Do Not Wear any make-up (especially eye make-up) to surgery. Please remove any false eyelashes or eyelash extensions. If you arrive the day of surgery with makeup/eyelashes on you will be required to remove prior to surgery. (There is a risk of corneal abrasions if eye makeup/eyelash extensions are not removed)      Leave all valuables at home.   Do Not wear any jewelry or watches, including any metal in body piercings. Jewelry must be removed prior to coming to the hospital.  There is a possibility that rings that are unable to be removed may be cut off if they are on the surgical extremity.    Please remove all hair extensions, wigs, clips and any other metal accessories/ ornaments from your hair.  These items may pose a flammable/fire risk in Surgery and must be removed.    Do not shave your surgical area at least 5 days prior to your surgery. The surgical prep will be performed at the hospital according to Infection Control regulations.    Contact Lens must be removed before surgery. Either do not wear the contact lens or bring a case and solution for storage.  Please bring a container for eyeglasses or dentures as required.  Bring any paperwork your physician has provided, such as consent forms,  history and physicals, doctor's orders, etc.   Bring comfortable clothes that are loose fitting to wear upon discharge. Take into consideration the type of surgery being performed.  Maintain your diet as advised per your physician the day prior to surgery.      Adequate rest the night before surgery is advised.   Park in the Parking lot behind the hospital or in the [facility] across the street from the parking lot. Parking is complimentary.  If you will be discharged the same day as your procedure, please arrange for a responsible adult to drive you home or to accompany you if traveling by taxi.    YOU WILL NOT BE PERMITTED TO DRIVE OR TO LEAVE THE HOSPITAL ALONE AFTER SURGERY.   If you are being discharged the same day, it is strongly recommended that you arrange for someone to remain with you for the first 24 hrs following your surgery.    The Surgeon will speak to your family/visitor after your surgery regarding the outcome of your surgery and post op care.  The Surgeon may speak to you after your surgery, but there is a possibility you may not remember the details.  Please check with your family members regarding the conversation with the Surgeon.    We strongly recommend whoever is bringing you home be present for discharge instructions.  This will ensure a thorough understanding for your post op home care.          Thank you for your cooperation.  The Staff of [facility].            Bathing Instructions with Hibiclens    Shower the evening before and morning of your procedure with Chlorhexidine (Hibiclens)  do not use Chlorhexidine on your face or genitals. Do not get in your eyes.  Wash your face with water and your regular face wash/soap  Use your regular shampoo  Apply Chlorhexidine (Hibiclens) directly on your skin or on a wet washcloth and wash gently. When showering: Move away from the shower stream when applying Chlorhexidine (Hibiclens) to avoid rinsing off too soon.  Rinse thoroughly with warm water  Do not dilute Chlorhexidine (Hibiclens)   Dry off as usual, do not use any deodorant, powder, body lotions, perfume, after shave or cologne.

## 2024-04-10 ENCOUNTER — TELEPHONE (OUTPATIENT)
Dept: GYNECOLOGIC ONCOLOGY | Facility: CLINIC | Age: 69
End: 2024-04-10
Payer: MEDICARE

## 2024-04-12 ENCOUNTER — ANESTHESIA (OUTPATIENT)
Dept: SURGERY | Facility: OTHER | Age: 69
DRG: 741 | End: 2024-04-12
Payer: MEDICARE

## 2024-04-12 ENCOUNTER — HOSPITAL ENCOUNTER (INPATIENT)
Facility: OTHER | Age: 69
LOS: 1 days | Discharge: HOME | DRG: 741 | End: 2024-04-12
Attending: OBSTETRICS & GYNECOLOGY | Admitting: OBSTETRICS & GYNECOLOGY
Payer: MEDICARE

## 2024-04-12 DIAGNOSIS — Z90.710 HISTORY OF ROBOT-ASSISTED LAPAROSCOPIC HYSTERECTOMY: Primary | ICD-10-CM

## 2024-04-12 DIAGNOSIS — C54.1 ENDOMETRIAL ADENOCARCINOMA: ICD-10-CM

## 2024-04-12 LAB — POCT GLUCOSE: 118 MG/DL (ref 70–110)

## 2024-04-12 PROCEDURE — 27201423 OPTIME MED/SURG SUP & DEVICES STERILE SUPPLY: Performed by: OBSTETRICS & GYNECOLOGY

## 2024-04-12 PROCEDURE — 88341 IMHCHEM/IMCYTCHM EA ADD ANTB: CPT | Mod: 26,,, | Performed by: PATHOLOGY

## 2024-04-12 PROCEDURE — 88341 IMHCHEM/IMCYTCHM EA ADD ANTB: CPT | Performed by: PATHOLOGY

## 2024-04-12 PROCEDURE — 25000003 PHARM REV CODE 250: Performed by: OBSTETRICS & GYNECOLOGY

## 2024-04-12 PROCEDURE — 38900 IO MAP OF SENT LYMPH NODE: CPT | Mod: 50,AS,, | Performed by: NURSE PRACTITIONER

## 2024-04-12 PROCEDURE — 71000033 HC RECOVERY, INTIAL HOUR: Performed by: OBSTETRICS & GYNECOLOGY

## 2024-04-12 PROCEDURE — 37000008 HC ANESTHESIA 1ST 15 MINUTES: Performed by: OBSTETRICS & GYNECOLOGY

## 2024-04-12 PROCEDURE — 25000003 PHARM REV CODE 250: Performed by: ANESTHESIOLOGY

## 2024-04-12 PROCEDURE — 25000003 PHARM REV CODE 250: Performed by: NURSE ANESTHETIST, CERTIFIED REGISTERED

## 2024-04-12 PROCEDURE — 8E0W4CZ ROBOTIC ASSISTED PROCEDURE OF TRUNK REGION, PERCUTANEOUS ENDOSCOPIC APPROACH: ICD-10-PCS | Performed by: OBSTETRICS & GYNECOLOGY

## 2024-04-12 PROCEDURE — 0UT94ZZ RESECTION OF UTERUS, PERCUTANEOUS ENDOSCOPIC APPROACH: ICD-10-PCS | Performed by: OBSTETRICS & GYNECOLOGY

## 2024-04-12 PROCEDURE — 63600175 PHARM REV CODE 636 W HCPCS: Performed by: OBSTETRICS & GYNECOLOGY

## 2024-04-12 PROCEDURE — 71000039 HC RECOVERY, EACH ADD'L HOUR: Performed by: OBSTETRICS & GYNECOLOGY

## 2024-04-12 PROCEDURE — 58573 TLH W/T/O UTERUS OVER 250 G: CPT | Mod: AS,,, | Performed by: NURSE PRACTITIONER

## 2024-04-12 PROCEDURE — 36000712 HC OR TIME LEV V 1ST 15 MIN: Performed by: OBSTETRICS & GYNECOLOGY

## 2024-04-12 PROCEDURE — 71000015 HC POSTOP RECOV 1ST HR: Performed by: OBSTETRICS & GYNECOLOGY

## 2024-04-12 PROCEDURE — 63600175 PHARM REV CODE 636 W HCPCS: Performed by: ANESTHESIOLOGY

## 2024-04-12 PROCEDURE — 38570 LAPAROSCOPY LYMPH NODE BIOP: CPT | Mod: 51,,, | Performed by: OBSTETRICS & GYNECOLOGY

## 2024-04-12 PROCEDURE — 58573 TLH W/T/O UTERUS OVER 250 G: CPT | Mod: ,,, | Performed by: OBSTETRICS & GYNECOLOGY

## 2024-04-12 PROCEDURE — 07BC4ZX EXCISION OF PELVIS LYMPHATIC, PERCUTANEOUS ENDOSCOPIC APPROACH, DIAGNOSTIC: ICD-10-PCS | Performed by: OBSTETRICS & GYNECOLOGY

## 2024-04-12 PROCEDURE — D9220A PRA ANESTHESIA: Mod: ANES,,, | Performed by: ANESTHESIOLOGY

## 2024-04-12 PROCEDURE — 88307 TISSUE EXAM BY PATHOLOGIST: CPT | Mod: 26,,, | Performed by: PATHOLOGY

## 2024-04-12 PROCEDURE — 88342 IMHCHEM/IMCYTCHM 1ST ANTB: CPT | Mod: 26,,, | Performed by: PATHOLOGY

## 2024-04-12 PROCEDURE — 38570 LAPAROSCOPY LYMPH NODE BIOP: CPT | Mod: AS,51,, | Performed by: NURSE PRACTITIONER

## 2024-04-12 PROCEDURE — 36000713 HC OR TIME LEV V EA ADD 15 MIN: Performed by: OBSTETRICS & GYNECOLOGY

## 2024-04-12 PROCEDURE — 88309 TISSUE EXAM BY PATHOLOGIST: CPT | Mod: 26,,, | Performed by: PATHOLOGY

## 2024-04-12 PROCEDURE — P9045 ALBUMIN (HUMAN), 5%, 250 ML: HCPCS | Mod: JZ,JG | Performed by: NURSE ANESTHETIST, CERTIFIED REGISTERED

## 2024-04-12 PROCEDURE — 63600175 PHARM REV CODE 636 W HCPCS: Performed by: NURSE ANESTHETIST, CERTIFIED REGISTERED

## 2024-04-12 PROCEDURE — 82962 GLUCOSE BLOOD TEST: CPT | Performed by: OBSTETRICS & GYNECOLOGY

## 2024-04-12 PROCEDURE — 71000016 HC POSTOP RECOV ADDL HR: Performed by: OBSTETRICS & GYNECOLOGY

## 2024-04-12 PROCEDURE — 37000009 HC ANESTHESIA EA ADD 15 MINS: Performed by: OBSTETRICS & GYNECOLOGY

## 2024-04-12 PROCEDURE — 63600175 PHARM REV CODE 636 W HCPCS

## 2024-04-12 PROCEDURE — 38900 IO MAP OF SENT LYMPH NODE: CPT | Mod: 50,,, | Performed by: OBSTETRICS & GYNECOLOGY

## 2024-04-12 PROCEDURE — 11000001 HC ACUTE MED/SURG PRIVATE ROOM

## 2024-04-12 PROCEDURE — 0UT74ZZ RESECTION OF BILATERAL FALLOPIAN TUBES, PERCUTANEOUS ENDOSCOPIC APPROACH: ICD-10-PCS | Performed by: OBSTETRICS & GYNECOLOGY

## 2024-04-12 PROCEDURE — D9220A PRA ANESTHESIA: Mod: CRNA,,, | Performed by: NURSE ANESTHETIST, CERTIFIED REGISTERED

## 2024-04-12 PROCEDURE — 0UT24ZZ RESECTION OF BILATERAL OVARIES, PERCUTANEOUS ENDOSCOPIC APPROACH: ICD-10-PCS | Performed by: OBSTETRICS & GYNECOLOGY

## 2024-04-12 RX ORDER — PROCHLORPERAZINE EDISYLATE 5 MG/ML
5 INJECTION INTRAMUSCULAR; INTRAVENOUS EVERY 30 MIN PRN
Status: DISCONTINUED | OUTPATIENT
Start: 2024-04-12 | End: 2024-04-12 | Stop reason: HOSPADM

## 2024-04-12 RX ORDER — OXYCODONE HYDROCHLORIDE 5 MG/1
5 TABLET ORAL EVERY 4 HOURS PRN
Status: DISCONTINUED | OUTPATIENT
Start: 2024-04-12 | End: 2024-04-12 | Stop reason: HOSPADM

## 2024-04-12 RX ORDER — HYDROMORPHONE HYDROCHLORIDE 2 MG/ML
0.4 INJECTION, SOLUTION INTRAMUSCULAR; INTRAVENOUS; SUBCUTANEOUS EVERY 5 MIN PRN
Status: DISCONTINUED | OUTPATIENT
Start: 2024-04-12 | End: 2024-04-12 | Stop reason: HOSPADM

## 2024-04-12 RX ORDER — DOCUSATE SODIUM 100 MG/1
100 CAPSULE, LIQUID FILLED ORAL 2 TIMES DAILY
Qty: 60 CAPSULE | Refills: 0 | Status: SHIPPED | OUTPATIENT
Start: 2024-04-12

## 2024-04-12 RX ORDER — MEPERIDINE HYDROCHLORIDE 25 MG/ML
12.5 INJECTION INTRAMUSCULAR; INTRAVENOUS; SUBCUTANEOUS ONCE AS NEEDED
Status: DISCONTINUED | OUTPATIENT
Start: 2024-04-12 | End: 2024-04-12 | Stop reason: HOSPADM

## 2024-04-12 RX ORDER — SODIUM CHLORIDE 9 MG/ML
INJECTION, SOLUTION INTRAVENOUS CONTINUOUS
Status: DISCONTINUED | OUTPATIENT
Start: 2024-04-12 | End: 2024-04-12 | Stop reason: HOSPADM

## 2024-04-12 RX ORDER — SODIUM CHLORIDE 0.9 % (FLUSH) 0.9 %
3 SYRINGE (ML) INJECTION
Status: DISCONTINUED | OUTPATIENT
Start: 2024-04-12 | End: 2024-04-12 | Stop reason: HOSPADM

## 2024-04-12 RX ORDER — INDOCYANINE GREEN AND WATER 25 MG
KIT INJECTION
Status: DISCONTINUED | OUTPATIENT
Start: 2024-04-12 | End: 2024-04-12 | Stop reason: HOSPADM

## 2024-04-12 RX ORDER — DEXAMETHASONE SODIUM PHOSPHATE 4 MG/ML
INJECTION, SOLUTION INTRA-ARTICULAR; INTRALESIONAL; INTRAMUSCULAR; INTRAVENOUS; SOFT TISSUE
Status: DISCONTINUED | OUTPATIENT
Start: 2024-04-12 | End: 2024-04-12

## 2024-04-12 RX ORDER — KETOROLAC TROMETHAMINE 30 MG/ML
15 INJECTION, SOLUTION INTRAMUSCULAR; INTRAVENOUS EVERY 6 HOURS PRN
Status: DISCONTINUED | OUTPATIENT
Start: 2024-04-12 | End: 2024-04-12 | Stop reason: HOSPADM

## 2024-04-12 RX ORDER — ACETAMINOPHEN 500 MG
1000 TABLET ORAL EVERY 6 HOURS PRN
Status: DISCONTINUED | OUTPATIENT
Start: 2024-04-12 | End: 2024-04-12 | Stop reason: HOSPADM

## 2024-04-12 RX ORDER — ROCURONIUM BROMIDE 10 MG/ML
INJECTION, SOLUTION INTRAVENOUS
Status: DISCONTINUED | OUTPATIENT
Start: 2024-04-12 | End: 2024-04-12

## 2024-04-12 RX ORDER — HYDROMORPHONE HYDROCHLORIDE 2 MG/ML
0.2 INJECTION, SOLUTION INTRAMUSCULAR; INTRAVENOUS; SUBCUTANEOUS
Status: CANCELLED | OUTPATIENT
Start: 2024-04-12

## 2024-04-12 RX ORDER — HYDROMORPHONE HYDROCHLORIDE 2 MG/ML
INJECTION, SOLUTION INTRAMUSCULAR; INTRAVENOUS; SUBCUTANEOUS
Status: DISCONTINUED | OUTPATIENT
Start: 2024-04-12 | End: 2024-04-12

## 2024-04-12 RX ORDER — LIDOCAINE HYDROCHLORIDE 10 MG/ML
0.5 INJECTION, SOLUTION EPIDURAL; INFILTRATION; INTRACAUDAL; PERINEURAL ONCE
Status: DISCONTINUED | OUTPATIENT
Start: 2024-04-12 | End: 2024-04-12 | Stop reason: HOSPADM

## 2024-04-12 RX ORDER — ALBUMIN HUMAN 50 G/1000ML
SOLUTION INTRAVENOUS
Status: DISCONTINUED | OUTPATIENT
Start: 2024-04-12 | End: 2024-04-12

## 2024-04-12 RX ORDER — ONDANSETRON HYDROCHLORIDE 2 MG/ML
4 INJECTION, SOLUTION INTRAVENOUS EVERY 4 HOURS PRN
Status: DISCONTINUED | OUTPATIENT
Start: 2024-04-12 | End: 2024-04-12 | Stop reason: HOSPADM

## 2024-04-12 RX ORDER — MUPIROCIN 20 MG/G
OINTMENT TOPICAL
Status: DISCONTINUED | OUTPATIENT
Start: 2024-04-12 | End: 2024-04-12 | Stop reason: HOSPADM

## 2024-04-12 RX ORDER — FAMOTIDINE 20 MG/1
20 TABLET, FILM COATED ORAL
Status: COMPLETED | OUTPATIENT
Start: 2024-04-12 | End: 2024-04-12

## 2024-04-12 RX ORDER — KETAMINE HYDROCHLORIDE 50 MG/ML
INJECTION, SOLUTION INTRAMUSCULAR; INTRAVENOUS
Status: DISCONTINUED | OUTPATIENT
Start: 2024-04-12 | End: 2024-04-12

## 2024-04-12 RX ORDER — OXYCODONE HYDROCHLORIDE 5 MG/1
5 TABLET ORAL EVERY 4 HOURS PRN
Qty: 10 TABLET | Refills: 0 | Status: SHIPPED | OUTPATIENT
Start: 2024-04-12

## 2024-04-12 RX ORDER — SODIUM CHLORIDE, SODIUM LACTATE, POTASSIUM CHLORIDE, CALCIUM CHLORIDE 600; 310; 30; 20 MG/100ML; MG/100ML; MG/100ML; MG/100ML
INJECTION, SOLUTION INTRAVENOUS CONTINUOUS
Status: DISCONTINUED | OUTPATIENT
Start: 2024-04-12 | End: 2024-04-12 | Stop reason: HOSPADM

## 2024-04-12 RX ORDER — HEPARIN SODIUM 5000 [USP'U]/ML
5000 INJECTION, SOLUTION INTRAVENOUS; SUBCUTANEOUS ONCE
Status: COMPLETED | OUTPATIENT
Start: 2024-04-12 | End: 2024-04-12

## 2024-04-12 RX ORDER — OXYCODONE HYDROCHLORIDE 5 MG/1
5 TABLET ORAL
Status: DISCONTINUED | OUTPATIENT
Start: 2024-04-12 | End: 2024-04-12 | Stop reason: HOSPADM

## 2024-04-12 RX ORDER — PROPOFOL 10 MG/ML
VIAL (ML) INTRAVENOUS
Status: DISCONTINUED | OUTPATIENT
Start: 2024-04-12 | End: 2024-04-12

## 2024-04-12 RX ORDER — FENTANYL CITRATE 50 UG/ML
INJECTION, SOLUTION INTRAMUSCULAR; INTRAVENOUS
Status: DISCONTINUED | OUTPATIENT
Start: 2024-04-12 | End: 2024-04-12

## 2024-04-12 RX ORDER — DEXTROMETHORPHAN HYDROBROMIDE, GUAIFENESIN 5; 100 MG/5ML; MG/5ML
650 LIQUID ORAL EVERY 6 HOURS
Qty: 60 TABLET | Refills: 0 | Status: SHIPPED | OUTPATIENT
Start: 2024-04-12 | End: 2024-05-03 | Stop reason: SDUPTHER

## 2024-04-12 RX ORDER — ONDANSETRON HYDROCHLORIDE 2 MG/ML
4 INJECTION, SOLUTION INTRAVENOUS EVERY 4 HOURS PRN
Status: CANCELLED | OUTPATIENT
Start: 2024-04-12

## 2024-04-12 RX ORDER — LIDOCAINE HYDROCHLORIDE 20 MG/ML
INJECTION INTRAVENOUS
Status: DISCONTINUED | OUTPATIENT
Start: 2024-04-12 | End: 2024-04-12

## 2024-04-12 RX ORDER — ONDANSETRON HYDROCHLORIDE 2 MG/ML
INJECTION, SOLUTION INTRAVENOUS
Status: DISCONTINUED | OUTPATIENT
Start: 2024-04-12 | End: 2024-04-12

## 2024-04-12 RX ORDER — ACETAMINOPHEN 500 MG
1000 TABLET ORAL EVERY 6 HOURS PRN
Status: CANCELLED | OUTPATIENT
Start: 2024-04-12

## 2024-04-12 RX ORDER — FAMOTIDINE 20 MG/1
20 TABLET, FILM COATED ORAL
Status: DISCONTINUED | OUTPATIENT
Start: 2024-04-12 | End: 2024-04-12 | Stop reason: HOSPADM

## 2024-04-12 RX ADMIN — HEPARIN SODIUM 5000 UNITS: 5000 INJECTION INTRAVENOUS; SUBCUTANEOUS at 09:04

## 2024-04-12 RX ADMIN — SUGAMMADEX 200 MG: 100 INJECTION, SOLUTION INTRAVENOUS at 02:04

## 2024-04-12 RX ADMIN — FAMOTIDINE 20 MG: 20 TABLET ORAL at 08:04

## 2024-04-12 RX ADMIN — PROPOFOL 150 MG: 10 INJECTION, EMULSION INTRAVENOUS at 10:04

## 2024-04-12 RX ADMIN — HYDROMORPHONE HYDROCHLORIDE 0.4 MG: 2 INJECTION INTRAMUSCULAR; INTRAVENOUS; SUBCUTANEOUS at 01:04

## 2024-04-12 RX ADMIN — ROCURONIUM BROMIDE 20 MG: 10 INJECTION INTRAVENOUS at 12:04

## 2024-04-12 RX ADMIN — HYDROMORPHONE HYDROCHLORIDE 0.2 MG: 2 INJECTION INTRAMUSCULAR; INTRAVENOUS; SUBCUTANEOUS at 01:04

## 2024-04-12 RX ADMIN — ALBUMIN (HUMAN) 250 ML: 12.5 SOLUTION INTRAVENOUS at 11:04

## 2024-04-12 RX ADMIN — CEFAZOLIN 2 G: 2 INJECTION, POWDER, FOR SOLUTION INTRAMUSCULAR; INTRAVENOUS at 11:04

## 2024-04-12 RX ADMIN — SODIUM CHLORIDE, SODIUM LACTATE, POTASSIUM CHLORIDE, AND CALCIUM CHLORIDE: 600; 310; 30; 20 INJECTION, SOLUTION INTRAVENOUS at 12:04

## 2024-04-12 RX ADMIN — SODIUM CHLORIDE, SODIUM LACTATE, POTASSIUM CHLORIDE, AND CALCIUM CHLORIDE: 600; 310; 30; 20 INJECTION, SOLUTION INTRAVENOUS at 09:04

## 2024-04-12 RX ADMIN — PROPOFOL 50 MG: 10 INJECTION, EMULSION INTRAVENOUS at 11:04

## 2024-04-12 RX ADMIN — ONDANSETRON HYDROCHLORIDE 4 MG: 2 INJECTION INTRAMUSCULAR; INTRAVENOUS at 01:04

## 2024-04-12 RX ADMIN — MUPIROCIN: 20 OINTMENT TOPICAL at 08:04

## 2024-04-12 RX ADMIN — KETAMINE HYDROCHLORIDE 10 MG: 50 INJECTION INTRAMUSCULAR; INTRAVENOUS at 12:04

## 2024-04-12 RX ADMIN — KETOROLAC TROMETHAMINE 15 MG: 30 INJECTION, SOLUTION INTRAMUSCULAR; INTRAVENOUS at 02:04

## 2024-04-12 RX ADMIN — ROCURONIUM BROMIDE 20 MG: 10 INJECTION INTRAVENOUS at 11:04

## 2024-04-12 RX ADMIN — OXYCODONE 5 MG: 5 TABLET ORAL at 02:04

## 2024-04-12 RX ADMIN — ROCURONIUM BROMIDE 10 MG: 10 INJECTION INTRAVENOUS at 11:04

## 2024-04-12 RX ADMIN — FENTANYL CITRATE 50 MCG: 0.05 INJECTION, SOLUTION INTRAMUSCULAR; INTRAVENOUS at 12:04

## 2024-04-12 RX ADMIN — ROCURONIUM BROMIDE 40 MG: 10 INJECTION INTRAVENOUS at 10:04

## 2024-04-12 RX ADMIN — DEXAMETHASONE SODIUM PHOSPHATE 4 MG: 4 INJECTION, SOLUTION INTRAMUSCULAR; INTRAVENOUS at 11:04

## 2024-04-12 RX ADMIN — LIDOCAINE HYDROCHLORIDE 60 MG: 20 INJECTION, SOLUTION INTRAVENOUS at 10:04

## 2024-04-12 RX ADMIN — KETAMINE HYDROCHLORIDE 30 MG: 50 INJECTION INTRAMUSCULAR; INTRAVENOUS at 11:04

## 2024-04-12 RX ADMIN — FENTANYL CITRATE 50 MCG: 0.05 INJECTION, SOLUTION INTRAMUSCULAR; INTRAVENOUS at 11:04

## 2024-04-12 RX ADMIN — FENTANYL CITRATE 100 MCG: 0.05 INJECTION, SOLUTION INTRAMUSCULAR; INTRAVENOUS at 10:04

## 2024-04-12 NOTE — OP NOTE
DATE OF PROCEDURE:  04/12/2024     SURGEON:  Morena Briscoe M.D.     ASSISTANTS: MARY [place], First Assist-- No qualified resident was available for the procedure. Dorina Stephens MD (RES)      PREOPERATIVE DIAGNOSIS:   1. Grade 3 endometrioid endometrial adenocarcinoma  2. Uterine leiomyoma      POSTOPERATIVE DIAGNOSES:    1. Grade 3 endometrioid endometrial adenocarcinoma  2. Uterine leiomyoma        PROCEDURE PERFORMED:  Robotic-assisted total laparoscopic hysterectomy,   bilateral salpingo-oophorectomy, bilateral sentinel lymph node mapping, bilateral sentinel lymph node dissection     ANESTHESIA:  General endotracheal anesthesia.     SPECIMENS REMOVED:  1.  Uterus and cervix, Bilateral fallopian tubes and ovaries.  2.  Left sentinel lymph node  3.  Right sentinel lymph node     ESTIMATED BLOOD LOSS:  <20 mL.     COMPLICATIONS:  None.     FINDINGS: Enlarged 14 week size leiomyomatous uterus. Normal fallopian tubes and ovaries bilaterally.  No ascites. No obvious intraperitoneal disease. +fluorescent sentinel lymph nodes bilaterally, 1 external iliac on the right and 1 external iliac on the left.      PROCEDURE IN DETAIL:  The patient was taken to the Operating Room.  Informed consent had been obtained.  She underwent general endotracheal anesthesia without difficulty, was prepped and draped in the normal sterile fashion in a dorsal lithotomy position.  Timeout was performed.  All parties agreed to the planned procedure.  Perioperative antibiotics were administered.  Christian catheter was placed under sterile conditions.  Two cervical injections of 1.25mg/ml ICG were performed both superficial and deep at the 3 and 9 o'clock position. A total of 4cc was used. The VCare uterine manipulator was then secured in place in a standard fashion for uterine manipulation. Gloves were changed and attention was turned to the patient's abdomen.       Veress needle was gently inserted in the umbilicus. Intra-abdominal  placement was confirmed with low CO2 pressure and water drop test.  Abdomen was insufflated and pneumoperitoneum was obtained.  Skin incision was made superior to the umbilicus. Robotic trocar was introduced.  Intra-abdominal placement was confirmed.  Additional trocars were placed, 2 robotic trocars to the left of the camera, 1 robotic trocar to the right of the camera and an additional 8 mm assist port to the right of the camera.  The patient was placed in steep Trendelenburg. Robot was docked and operating surgeon reported to the console.       Survey of the abdomen and pelvis revealed the above findings. Bilateral round ligaments were identified.  These were cauterized and transected.  The posterior leaf of the broad ligament was then opened bilaterally facilitating access to the retroperitoneum. Paravesical and pararectal spaces were opened. We identified +fluorescent sentinel pelvic lymph nodes bilaterally and these were excised, location as above.      The infundibulopelvic ligaments were then skeletonized with good visualization of the ureter beneath. These were cauterized and transected.  The anterior leaf of the broad ligament was then opened circumferentially.  Proper plane for the bladder flap was identified and the bladder was gently reflected off of the anterior aspect of the uterus and cervix to the level of the cervicovaginal junction. Bilateral uterine arteries were then skeletonized.  These were cauterized and transected.  The remainder of the uterosacral and cardinal ligaments were then also serially cauterized and transected.  Posterior colpotomy was initiated in the 6 o'clock position and carried around circumferentially.  The uterus, cervix, bilateral fallopian tubes and ovaries were then placed in an endocatch bag and removed through the vagina. Lymph nodes were removed through the vagina as well.        We then closed the vaginal cuff with a V-Loc running suture in a running fashion.  Bilateral ureters were reinspected and both noted to be vermiculating equally and briskly. Pelvis was hemostatic. The robotic trocars were then removed under direct visualization and the robot was undocked. Skin incisions were then rendered hemostatic.  These were closed with 4-0 Monocryl in a subcuticular fashion and topped with sterile Dermabond. The patient tolerated the procedure well. Sponge, lap, needle and instrument counts were correct x2 as reported by the circulating nurse.  She was awakened from anesthesia and taken to recovery in stable condition.

## 2024-04-12 NOTE — PLAN OF CARE
Cherri Faheem Gilmore has met all discharge criteria from Phase II. Vital Signs are stable, ambulating  without difficulty. Discharge instructions given, patient verbalized understanding. Discharged from facility via wheelchair in stable condition.

## 2024-04-12 NOTE — DISCHARGE SUMMARY
[facility]  Brief Op Note/Discharge Note  Short Stay    Admit Date: 4/12/2024    Discharge Date: 04/12/2024    Attending Physician: José Miguel Briscoe MD     Surgery Date: 4/12/2024     Surgeon(s) and Role:     * José Miguel Briscoe MD - Primary    Assisting Surgeon:      * Dorina Stephens MD - Resident    Pre-op Diagnosis:  Endometrial adenocarcinoma [C54.1]    Post-op Diagnosis:  Post-Op Diagnosis Codes:     * Endometrial adenocarcinoma [C54.1]    Procedure(s) (LRB):  XI ROBOTIC HYSTERECTOMY (N/A)  XI ROBOTIC SALPINGO-OOPHORECTOMY (Bilateral)  MAPPING, LYMPH NODE, SENTINEL (N/A)  BIOPSY, LYMPH NODE (N/A)    Anesthesia: General    Findings/Key Components: See Op Note for full details.    Estimated Blood Loss: * No values recorded between 4/12/2024 12:00 AM and 4/12/2024  9:47 AM *         Specimens:   Specimen (24h ago, onward)       Start     Ordered    04/12/24 1412  Specimen to Pathology, Surgery Gynecology and Obstetrics  Once        Comments: Pre-op Diagnosis: Endometrial adenocarcinoma [C54.1]Procedure(s):XI ROBOTIC HYSTERECTOMYXI ROBOTIC SALPINGO-OOPHORECTOMYMAPPING, LYMPH NODE, SENTINELBIOPSY, LYMPH NODE Number of specimens: 3Name of specimens: 1. Uterus, cervix, bilateral tubes and ovaries 2. Right pelvic sentinel lymph node 3. Left pelvic sentinel lymph node     References:    Click here for ordering Quick Tip   Question Answer Comment   Procedure Type: Gynecology and Obstetrics    Specimen Class: Routine/Screening    Which provider would you like to cc? JOSÉ MIGUEL BRISCOE    Release to patient Immediate        04/12/24 1411    Pending  Specimen to Pathology, Surgery Gynecology and Obstetrics  Once        Comments: Pre-op Diagnosis: Endometrial adenocarcinoma [C54.1]Procedure(s):XI ROBOTIC HYSTERECTOMYXI ROBOTIC SALPINGO-OOPHORECTOMYMAPPING, LYMPH NODE, SENTINELBIOPSY, LYMPH NODE Number of specimens: 3Name of specimens: 1. UTERUS, CERVIX, BILATERAL TUBES AND OVARIES                                   2. RIGHT PELVIC SENTINEL LYMPH NODE                                  3. LEFT PELVIC SENTINEL LYMPH NODE     References:    Click here for ordering Quick Tip   Question Answer Comment   Procedure Type: Gynecology and Obstetrics    Specimen Class: Known or suspected malignancy    Which provider would you like to cc? JOSÉ MIGUEL DIAZ    Release to patient Immediate        Pending                    Discharge Provider: Dorina Stephens    Diagnoses:  Active Hospital Problems    Diagnosis  POA    *H/o RA-TLH/BSO/SLND [Z90.710]  Not Applicable      Resolved Hospital Problems   No resolved problems to display.       Discharged Condition: good    Hospital Course:   Patient was admitted for outpatient procedure as above, and tolerated the procedure well with no complications. Please see operative report for further details. Following the procedure, the patient was awakened from anesthesia and transferred to the recovery area in stable condition. She was discharged to home once ambulating, voiding, tolerating PO intake, and pain was well-controlled. Patient was given routine post-op instructions and prescriptions for pain medication to take as needed. Patient instructed to follow up with Dr. Diaz in 4 weeks.    Final Diagnoses: Same as principal problem.    Disposition: Home or Self Care    Follow up/Patient Instructions:    Medications:  Reconciled Home Medications:      Medication List        START taking these medications      docusate sodium 100 MG capsule  Commonly known as: COLACE  [place] yuan cápsula (100 mg en total) por vía oral 2 veces al día.  (Take 1 capsule (100 mg total) by mouth 2 (two) times daily.)     oxyCODONE 5 MG immediate release tablet  Commonly known as: ROXICODONE  [place] 1 tableta (5 mg en total) por vía oral cada 4 (cuatro) horas según sea necesario para el dolor.  (Take 1 tablet (5 mg total) by mouth every 4 (four) hours as needed for Pain.)            CHANGE how you take these medications      *  acetaminophen 500 MG tablet  Commonly known as: TYLENOL  Take 1,000 mg by mouth every 6 (six) hours as needed for Pain.  What changed: Another medication with the same name was added. Make sure you understand how and when to take each.     * acetaminophen 650 MG Tbsr  Commonly known as: TYLENOL  Take 1 tablet (650 mg total) by mouth every 6 (six) hours. Alternate between ibuprofen and tylenol every 3 hours. For example: @0800: ibuprofen 600mg @1100: tylenol 650mg @1400: ibuprofen 600mg @1700: tylenol 650 mg @2000: ibuprofen 600mg  What changed: You were already taking a medication with the same name, and this prescription was added. Make sure you understand how and when to take each.           * This list has 2 medication(s) that are the same as other medications prescribed for you. Read the directions carefully, and ask your doctor or other care provider to review them with you.                CONTINUE taking these medications      amLODIPine 10 MG tablet  Commonly known as: NORVASC  Take 10 mg by mouth once daily.     losartan-hydrochlorothiazide 100-25 mg 100-25 mg per tablet  Commonly known as: HYZAAR  Take 1 tablet by mouth once daily.            Discharge Procedure Orders   Diet general     Diet general     Lifting restrictions   Order Comments: No lifting greater than 15 pounds for six weeks.     Other restrictions (specify):   Order Comments: PELVIC REST:  No douching, tampons, or intercourse for 6 weeks.    If prescribed, vaginal estrogen cream may be used during the postoperative period.     DRIVING:  No driving while on narcotics. Driving may be resumed initially with a competent passenger one to two weeks after surgery if no longer taking narcotics.     EXERCISE:  For six weeks your exercise should be limited to walking. You may walk as far as you wish, as long as you increase your level of exertion gradually and avoid slippery surfaces. You may climb stairs as needed to get around, but should not use  stair climbing for exercise.     Remove dressing in 24 hours   Order Comments: If you have a bandage on wound, you may remove it the day after dismissal.  If you had steri-strips remove them once they begin to peel off (usually 2 weeks). Keep incision clean and dry.  Inspect the incision daily for signs and symptoms of infection.     Wound care routine (specify)   Order Comments: WOUND CARE:  If you have a band-aid or bandage on your wound, you may remove it the day after dismissal.  If you had steri-strips remove them once they begin to peel off (usually 2 weeks).  If your steri-strips still haven't come off in 2 weeks, please remove them. You may wash the wound with mild soap and water.   You may shower at any time but should avoid immersing any abdominal incisions in water for at least two weeks after surgery or until the wound is completely healed. If given, please shower with Hibiclens soap until bottle is completely finished. Keep your wound clean and dry.  You should observe your incision for signs of infection which include redness, warmth, drainage or fever.     Call MD for:  temperature >100.4     Call MD for:  persistent nausea and vomiting     Call MD for:  severe uncontrolled pain     Call MD for:  difficulty breathing, headache or visual disturbances     Call MD for:  redness, tenderness, or signs of infection (pain, swelling, redness, odor or green/yellow discharge around incision site)     Call MD for:  hives     Call MD for:   Order Comments: inability to void,urine is ketchup colored or you have large clots, vaginal bleeding is heavier than a period.    VAGINAL DISCHARGE: You may develop a vaginal discharge and intermittent vaginal spotting after surgery and up to 6 weeks postoperatively.  The discharge may have an odor and may change in color but it is normal.  This is due to dissolving stiches.  Contact your surgical team if you develop vaginal or vulvar irritation along with a discharge.  Also  contact your surgical team if you have vaginal discharge that smells like urine or stool.    CONSTIPATION REMEDIES: Patients are often constipated after surgery or with use of oral narcotic medicine. You should continue to take the stool softener, Senokot-S during the next six weeks, and consume adequate amounts of water.  If you have not had a bowel movement for 3 days after dismissal, or are uncomfortable and unable to pass stool, please try one or all of the following measures:  1.  Milk of Magnesia - 30 cc by mouth every 12 hours   2.  Dulcolax suppository - One suppository per rectum every 4-6 hours   3.  Metamucil, Fibercon or other bulk former - use as directed  4.  Fleets Enema        PAIN MEDICATIONS:     Take your pain medications as instructed. It is best to take pain medications before your pain becomes severe. This will allow you to take less medication yet have better pain relief. For the first 2 or 3 days it may be helpful to take your pain medications on a regular schedule (e.g. every 4 to 6 hours). This will help you to keep your pain under better control. You should then begin to take fewer medications each day until you no longer need them. Do not take pain medication on an empty stomach. This may lead to nausea and vomiting.     Lifting restrictions   Order Comments: LIFTING:  No lifting greater than 15 pounds for six weeks.     PELVIC REST:  No douching, tampons, or intercourse for 6 weeks.  If prescribed, vaginal estrogen cream may be used during the postoperative period.     Other restrictions (specify):   Order Comments: DRIVING:  No driving while on narcotics. Driving may be resumed initially with a competent passenger one to two weeks after surgery if no longer taking narcotics.     EXERCISE:  For six weeks your exercise should be limited to walking. You may walk as far as you wish, as long as you increase your level of exertion gradually and avoid slippery surfaces. You may climb stairs as  needed to get around, but should not use stair climbing for exercise.     Leave dressing on - Keep it clean, dry, and intact until clinic visit     Remove dressing in 24 hours   Order Comments: If you have a bandage on wound, you may remove it the day after dismissal.  If you had steri-strips remove them once they begin to peel off (usually 2 weeks). Keep incision clean and dry.  Inspect the incision daily for signs and symptoms of infection.     Call MD for:  temperature >100.4     Call MD for:  persistent nausea and vomiting     Call MD for:  severe uncontrolled pain     Call MD for:  difficulty breathing, headache or visual disturbances     Call MD for:  redness, tenderness, or signs of infection (pain, swelling, redness, odor or green/yellow discharge around incision site)     Call MD for:  hives     Call MD for:   Order Comments: inability to void,urine is ketchup colored or you have large clots, vaginal bleeding is heavier than a period.    VAGINAL DISCHARGE: You may develop a vaginal discharge and intermittent vaginal spotting after surgery and up to 6 weeks postoperatively.  The discharge may have an odor and may change in color but it is normal.  This is due to dissolving stiches.  Contact your surgical team if you develop vaginal or vulvar irritation along with a discharge.  Also contact your surgical team if you have vaginal discharge that smells like urine or stool.    PAIN MEDICATIONS:     Take your pain medications as instructed. It is best to take pain medications before your pain becomes severe. This will allow you to take less medication yet have better pain relief. For the first 2 or 3 days it may be helpful to take your pain medications on a regular schedule (e.g. every 4 to 6 hours). This will help you to keep your pain under better control. You should then begin to take fewer medications each day until you no longer need them. Do not take pain medication on an empty stomach. This may lead to  nausea and vomiting.    CONSTIPATION REMEDIES: Patients are often constipated after surgery or with use of oral narcotic medicine. You should continue to take the stool softener, Senokot-S during the next six weeks, and consume adequate amounts of water.  If you have not had a bowel movement for 3 days after dismissal, or are uncomfortable and unable to pass stool, please try one or all of the following measures:  1.  Milk of Magnesia - 30 cc by mouth every 12 hours   2.  Dulcolax suppository - One suppository per rectum every 4-6 hours   3.  Metamucil, Fibercon or other bulk former - use as directed  4.  Fleets Enema  5.  Prunes or Prune juice    If you continue to have constipation after trying the above remedies, you should contact your surgical team using the contact information listed above     Activity as tolerated   Order Comments: Return to normal activity slowly as you feel able.  For 6 weeks your exercise should be limited to walking.  You may walk as far as you wish, as long as you increase your level of exertion gradually and avoid slippery surfaces.    If you had a hysterectomy at the surgery do not insert anything in your vagina for 9 weeks.     Shower on day dressing removed (No bath)   Order Comments: You may shower at any time but should avoid immersing any abdominal incisions in water for at least 2 weeks after surgery or until the wound is completely healed.  If given, please shower with Hibaclens soap until bottle is completely finish     Activity as tolerated   Order Comments: Return to normal activity slowly as you feel able.  For 6 weeks your exercise should be limited to walking.  You may walk as far as you wish, as long as you increase your level of exertion gradually and avoid slippery surfaces.     Shower on day dressing removed (No bath)   Order Comments: You may shower at any time but should avoid immersing any abdominal incisions in water for at least 2 weeks after surgery or until the  wound is completely healed.  If given, please shower with Hibaclens soap until bottle is completely finish      Follow-up Information       Morena Briscoe MD Follow up in 4 week(s).    Specialty: Gynecologic Oncology  Why: post-op follow up  Contact information:  [address] [facility] [address]  [phone]                             Dorina Stephens MD   PGY-4, OB-GYN

## 2024-04-12 NOTE — OPERATIVE NOTE ADDENDUM
Certification of Assistant at Surgery       Surgery Date: 4/12/2024     Participating Surgeons:  Surgeon(s) and Role:     * Morena Briscoe MD - Primary    Procedures:  Procedure(s) (LRB):  XI ROBOTIC HYSTERECTOMY (N/A)  XI ROBOTIC SALPINGO-OOPHORECTOMY (Bilateral)  MAPPING, LYMPH NODE, SENTINEL (N/A)  BIOPSY, LYMPH NODE (N/A)    Assistant Surgeon's Certification of Necessity:  I understand that section 1842 (b) (6) (d) of the Social Security Act generally prohibits Medicare Part B reasonable charge payment for the services of assistants at surgery in teaching hospitals when qualified residents are available to furnish such services. I certify that the services for which payment is claimed were medically necessary, and that no qualified resident was available to perform the services. I further understand that these services are subject to post-payment review by the Medicare carrier.      Елена Harley NP    04/12/2024  2:23 PM

## 2024-04-12 NOTE — INTERVAL H&P NOTE
The patient has been examined and the H&P has been reviewed:    I concur with the findings and no changes have occurred since H&P was written.    Surgery risks, benefits and alternative options discussed and understood by patient/family.    [facility]  ID#: [id number], Turkish used for this encounter.    Cherri Gilmore is 69 y.o. No obstetric history on file. presenting for scheduled RA-TLH/BSO/SLND.    Temp:  [98.3 °F (36.8 °C)] 98.3 °F (36.8 °C)  Pulse:  [93] 93  Resp:  [18] 18  SpO2:  [98 %] 98 %  BP: (146)/(83) 146/83    General: NAD, alert, oriented, cooperative  HEENT: NCAT, EOM grossly intact  Lungs: Normal WOB  Heart: regular rate  Abdomen: soft, nondistended, nontender, no rebound or guarding    Consents in chart. Pre-operative heparin given at 0930 . All questions answered and concerns addressed. To OR for planned procedure.      Rico Zarate MD PGY1  Obstetrics and Gynecology          Active Hospital Problems    Diagnosis  POA    *H/o RA-TLH/BSO/SLND [Z90.710]  Not Applicable      Resolved Hospital Problems   No resolved problems to display.

## 2024-04-12 NOTE — ANESTHESIA POSTPROCEDURE EVALUATION
Anesthesia Post Evaluation    Patient: Cherri Gilmore    Procedure(s) Performed: Procedure(s) (LRB):  XI ROBOTIC HYSTERECTOMY (N/A)  XI ROBOTIC SALPINGO-OOPHORECTOMY (Bilateral)  MAPPING, LYMPH NODE, SENTINEL (N/A)  BIOPSY, LYMPH NODE (N/A)    Final Anesthesia Type: general      Patient location during evaluation: PACU  Patient participation: Yes- Able to Participate  Level of consciousness: awake and alert  Post-procedure vital signs: reviewed and stable  Pain management: adequate  Airway patency: patent    PONV status at discharge: No PONV  Anesthetic complications: no      Cardiovascular status: blood pressure returned to baseline  Respiratory status: unassisted  Hydration status: euvolemic  Follow-up not needed.              Vitals Value Taken Time   /67 04/12/24 1532   Temp 36.3 °C (97.3 °F) 04/12/24 1530   Pulse 90 04/12/24 1537   Resp 16 04/12/24 1530   SpO2 93 % 04/12/24 1537   Vitals shown include unvalidated device data.      Event Time   Out of Recovery 15:38:08         Pain/Jovani Score: Pain Rating Prior to Med Admin: 6 (4/12/2024  2:25 PM)  Pain Rating Post Med Admin: 2 (4/12/2024  3:15 PM)  Jovani Score: 9 (4/12/2024  3:15 PM)

## 2024-04-12 NOTE — TRANSFER OF CARE
"Anesthesia Transfer of Care Note    Patient: Cherri Gilmore    Procedure(s) Performed: Procedure(s) (LRB):  XI ROBOTIC HYSTERECTOMY (N/A)  XI ROBOTIC SALPINGO-OOPHORECTOMY (Bilateral)  MAPPING, LYMPH NODE, SENTINEL (N/A)  BIOPSY, LYMPH NODE (N/A)    Patient location: PACU    Anesthesia Type: general    Transport from OR: Transported from OR on 6-10 L/min O2 by face mask with adequate spontaneous ventilation    Post pain: adequate analgesia    Post assessment: no apparent anesthetic complications and tolerated procedure well    Post vital signs: stable    Level of consciousness: awake and alert    Nausea/Vomiting: no nausea/vomiting    Complications: none    Transfer of care protocol was followed      Last vitals: Visit Vitals  BP (!) 146/83 (BP Location: Right arm, Patient Position: Sitting)   Pulse 93   Temp 36.8 °C (98.3 °F) (Oral)   Resp 18   Ht 5' 7" (1.702 m)   Wt 99.8 kg (220 lb 0.3 oz)   LMP  (LMP Unknown)   SpO2 98%   Breastfeeding No   BMI 34.46 kg/m²     "
SHORTNESS OF BREATH

## 2024-04-12 NOTE — ANESTHESIA PROCEDURE NOTES
Intubation    Date/Time: 4/12/2024 11:00 AM    Performed by: Ne Oswald CRNA  Authorized by: Onel Bunch MD    Intubation:     Induction:  Intravenous    Intubated:  Postinduction    Mask Ventilation:  Easy with oral airway    Attempts:  1    Attempted By:  CRNA    Method of Intubation:  Video laryngoscopy    Blade:  Castle 3    Laryngeal View Grade: Grade I - full view of cords      Difficult Airway Encountered?: No      Complications:  None    Airway Device:  Oral endotracheal tube    Airway Device Size:  7.0    Style/Cuff Inflation:  Cuffed (inflated to minimal occlusive pressure)    Tube secured:  22    Secured at:  The lips    Placement Verified By:  Capnometry    Complicating Factors:  None    Findings Post-Intubation:  BS equal bilateral and atraumatic/condition of teeth unchanged

## 2024-04-15 VITALS
TEMPERATURE: 98 F | HEART RATE: 96 BPM | RESPIRATION RATE: 16 BRPM | OXYGEN SATURATION: 99 % | BODY MASS INDEX: 34.53 KG/M2 | WEIGHT: 220 LBS | SYSTOLIC BLOOD PRESSURE: 137 MMHG | DIASTOLIC BLOOD PRESSURE: 77 MMHG | HEIGHT: 67 IN

## 2024-04-18 ENCOUNTER — TELEPHONE (OUTPATIENT)
Dept: HEMATOLOGY/ONCOLOGY | Facility: CLINIC | Age: 69
End: 2024-04-18
Payer: MEDICARE

## 2024-04-18 DIAGNOSIS — C54.1 ENDOMETRIAL ADENOCARCINOMA: Primary | ICD-10-CM

## 2024-04-18 NOTE — TELEPHONE ENCOUNTER
Nurse attempted to call pt's son to make him aware of lab scheduled for 5/1 directly before pt's appt with Dr. Briscoe on 5/1.  No answer, left voicemail.

## 2024-04-30 ENCOUNTER — TELEPHONE (OUTPATIENT)
Dept: GYNECOLOGIC ONCOLOGY | Facility: CLINIC | Age: 69
End: 2024-04-30
Payer: MEDICARE

## 2024-04-30 NOTE — TELEPHONE ENCOUNTER
1303: [facility] intereprter utilized; operater ID# [id number]  Instructed pt to come into appointment unless temp is greater than 100.4. Per pt, she has not been running a fever, only coughing. Clinic informed pt to mask up for appointment tomorrow. Pt verbalizes understanding.

## 2024-04-30 NOTE — TELEPHONE ENCOUNTER
----- Message from Tomy Kemar sent at 4/30/2024 12:47 PM CDT -----  Type: General Call Back         Name of Caller:pt  Symptoms:flu  Would the patient rather a call back or a response via [employer]? call  Best Call Back Number:[phone]   Additional Information: Pt states she would like a call back from nurse in office regarding her possibly having the flu. Pt states she has an post op appt tomorrow and wants to know if she should still come to appt. Please call pt with further info and assistance. Thank you.

## 2024-05-01 ENCOUNTER — LAB VISIT (OUTPATIENT)
Dept: LAB | Facility: OTHER | Age: 69
End: 2024-05-01
Attending: OBSTETRICS & GYNECOLOGY
Payer: MEDICARE

## 2024-05-01 ENCOUNTER — OFFICE VISIT (OUTPATIENT)
Dept: GYNECOLOGIC ONCOLOGY | Facility: CLINIC | Age: 69
End: 2024-05-01
Payer: MEDICARE

## 2024-05-01 VITALS
HEART RATE: 88 BPM | DIASTOLIC BLOOD PRESSURE: 73 MMHG | BODY MASS INDEX: 34.2 KG/M2 | SYSTOLIC BLOOD PRESSURE: 138 MMHG | WEIGHT: 217.88 LBS | HEIGHT: 67 IN

## 2024-05-01 DIAGNOSIS — Z90.710 HISTORY OF ROBOT-ASSISTED LAPAROSCOPIC HYSTERECTOMY: ICD-10-CM

## 2024-05-01 DIAGNOSIS — C54.1 ENDOMETRIAL ADENOCARCINOMA: Primary | ICD-10-CM

## 2024-05-01 DIAGNOSIS — C54.1 ENDOMETRIAL ADENOCARCINOMA: ICD-10-CM

## 2024-05-01 LAB — TEMPUS BLOOD ADD-ON: NORMAL

## 2024-05-01 PROCEDURE — 3078F DIAST BP <80 MM HG: CPT | Mod: CPTII,S$GLB,, | Performed by: OBSTETRICS & GYNECOLOGY

## 2024-05-01 PROCEDURE — 99999 PR PBB SHADOW E&M-EST. PATIENT-LVL III: CPT | Mod: PBBFAC,,, | Performed by: OBSTETRICS & GYNECOLOGY

## 2024-05-01 PROCEDURE — 99024 POSTOP FOLLOW-UP VISIT: CPT | Mod: S$GLB,,, | Performed by: OBSTETRICS & GYNECOLOGY

## 2024-05-01 PROCEDURE — 1101F PT FALLS ASSESS-DOCD LE1/YR: CPT | Mod: CPTII,S$GLB,, | Performed by: OBSTETRICS & GYNECOLOGY

## 2024-05-01 PROCEDURE — 3288F FALL RISK ASSESSMENT DOCD: CPT | Mod: CPTII,S$GLB,, | Performed by: OBSTETRICS & GYNECOLOGY

## 2024-05-01 PROCEDURE — 1125F AMNT PAIN NOTED PAIN PRSNT: CPT | Mod: CPTII,S$GLB,, | Performed by: OBSTETRICS & GYNECOLOGY

## 2024-05-01 PROCEDURE — 3075F SYST BP GE 130 - 139MM HG: CPT | Mod: CPTII,S$GLB,, | Performed by: OBSTETRICS & GYNECOLOGY

## 2024-05-01 PROCEDURE — 3044F HG A1C LEVEL LT 7.0%: CPT | Mod: CPTII,S$GLB,, | Performed by: OBSTETRICS & GYNECOLOGY

## 2024-05-01 PROCEDURE — 36415 COLL VENOUS BLD VENIPUNCTURE: CPT | Performed by: OBSTETRICS & GYNECOLOGY

## 2024-05-01 NOTE — Clinical Note
New start carbo/taxol q3 weeks. Buddhist. I placed orders for port and treatment plan. Needs new start chemo education. Son is a good contact.

## 2024-05-03 ENCOUNTER — OFFICE VISIT (OUTPATIENT)
Dept: INTERNAL MEDICINE | Facility: CLINIC | Age: 69
End: 2024-05-03
Payer: MEDICARE

## 2024-05-03 VITALS
HEART RATE: 64 BPM | DIASTOLIC BLOOD PRESSURE: 80 MMHG | HEIGHT: 67 IN | BODY MASS INDEX: 33.98 KG/M2 | WEIGHT: 216.5 LBS | SYSTOLIC BLOOD PRESSURE: 136 MMHG | OXYGEN SATURATION: 100 %

## 2024-05-03 DIAGNOSIS — I10 HYPERTENSION, UNSPECIFIED TYPE: ICD-10-CM

## 2024-05-03 DIAGNOSIS — R73.9 HYPERGLYCEMIA: ICD-10-CM

## 2024-05-03 DIAGNOSIS — Z00.00 ANNUAL PHYSICAL EXAM: Primary | ICD-10-CM

## 2024-05-03 DIAGNOSIS — H66.91 OTITIS OF RIGHT EAR: ICD-10-CM

## 2024-05-03 DIAGNOSIS — I10 PRIMARY HYPERTENSION: Primary | ICD-10-CM

## 2024-05-03 DIAGNOSIS — A41.9 SEPSIS: ICD-10-CM

## 2024-05-03 DIAGNOSIS — Z12.31 ENCOUNTER FOR SCREENING MAMMOGRAM FOR BREAST CANCER: ICD-10-CM

## 2024-05-03 DIAGNOSIS — I47.10 SVT (SUPRAVENTRICULAR TACHYCARDIA): ICD-10-CM

## 2024-05-03 DIAGNOSIS — C54.1 ENDOMETRIAL CANCER: ICD-10-CM

## 2024-05-03 PROCEDURE — 1111F DSCHRG MED/CURRENT MED MERGE: CPT | Mod: CPTII,S$GLB,, | Performed by: INTERNAL MEDICINE

## 2024-05-03 PROCEDURE — 3075F SYST BP GE 130 - 139MM HG: CPT | Mod: CPTII,S$GLB,, | Performed by: INTERNAL MEDICINE

## 2024-05-03 PROCEDURE — 1101F PT FALLS ASSESS-DOCD LE1/YR: CPT | Mod: CPTII,S$GLB,, | Performed by: INTERNAL MEDICINE

## 2024-05-03 PROCEDURE — 1159F MED LIST DOCD IN RCRD: CPT | Mod: CPTII,S$GLB,, | Performed by: INTERNAL MEDICINE

## 2024-05-03 PROCEDURE — 3288F FALL RISK ASSESSMENT DOCD: CPT | Mod: CPTII,S$GLB,, | Performed by: INTERNAL MEDICINE

## 2024-05-03 PROCEDURE — 1125F AMNT PAIN NOTED PAIN PRSNT: CPT | Mod: CPTII,S$GLB,, | Performed by: INTERNAL MEDICINE

## 2024-05-03 PROCEDURE — 1160F RVW MEDS BY RX/DR IN RCRD: CPT | Mod: CPTII,S$GLB,, | Performed by: INTERNAL MEDICINE

## 2024-05-03 PROCEDURE — 99999 PR PBB SHADOW E&M-EST. PATIENT-LVL IV: CPT | Mod: PBBFAC,,, | Performed by: INTERNAL MEDICINE

## 2024-05-03 PROCEDURE — 99204 OFFICE O/P NEW MOD 45 MIN: CPT | Mod: S$GLB,,, | Performed by: INTERNAL MEDICINE

## 2024-05-03 PROCEDURE — 3008F BODY MASS INDEX DOCD: CPT | Mod: CPTII,S$GLB,, | Performed by: INTERNAL MEDICINE

## 2024-05-03 PROCEDURE — 3079F DIAST BP 80-89 MM HG: CPT | Mod: CPTII,S$GLB,, | Performed by: INTERNAL MEDICINE

## 2024-05-03 PROCEDURE — 3044F HG A1C LEVEL LT 7.0%: CPT | Mod: CPTII,S$GLB,, | Performed by: INTERNAL MEDICINE

## 2024-05-03 RX ORDER — DEXTROMETHORPHAN HYDROBROMIDE, GUAIFENESIN 5; 100 MG/5ML; MG/5ML
650 LIQUID ORAL EVERY 8 HOURS PRN
Qty: 60 TABLET | Refills: 0 | Status: SHIPPED | OUTPATIENT
Start: 2024-05-03

## 2024-05-03 RX ORDER — AMOXICILLIN 500 MG/1
500 TABLET, FILM COATED ORAL 2 TIMES DAILY
Qty: 14 TABLET | Refills: 0 | Status: SHIPPED | OUTPATIENT
Start: 2024-05-03 | End: 2024-05-10

## 2024-05-03 RX ORDER — AMOXICILLIN 500 MG/1
500 TABLET, FILM COATED ORAL 2 TIMES DAILY
Qty: 14 TABLET | Refills: 0 | Status: SHIPPED | OUTPATIENT
Start: 2024-05-03 | End: 2024-05-03

## 2024-05-03 RX ORDER — FLUTICASONE PROPIONATE 50 MCG
1 SPRAY, SUSPENSION (ML) NASAL DAILY
Qty: 16 G | Refills: 2 | Status: SHIPPED | OUTPATIENT
Start: 2024-05-03

## 2024-05-03 NOTE — PROGRESS NOTES
"Subjective:       Patient ID: Cherri Gilmore is a 69 y.o. female.    Chief Complaint: Hospital Follow Up  This is a 69-year-old who presents today for hospital follow-up.  Patient reports that she recently moved back to [place] from [place] about 9 months ago . She used to work as nursing assistant and has been following with PCP on [facility].  She developed sudden chills and fever last month and went to the hospital where she was evaluated and treated reports prior that time she had been having some vaginal bleeding as well.  She was diagnosed with sepsis and ultimately had biopsy and diagnosed with endometrial cancer she had hysterectomy and has seen Gynecology Oncology.  She reports they are planning on doing some additional chemotherapy treatment as well.  She does take blood pressure medicines for hypertension amlodipine and losartan HCTZ blood pressure is controlled with her medicines.  She is recovering from the hysterectomy without difficulty.  She has had no recurrence in her fever she did have an upper respiratory infection last week that seems to be improving but hit having residual right ear pain.  When she was hospitalized she also had SVT.     Follow-up  Associated symptoms include fatigue. Pertinent negatives include no fever or headaches.     Review of Systems   Constitutional:  Positive for fatigue. Negative for fever.   HENT:          Right ear pain/fluid     Dry cough    Respiratory:  Negative for shortness of breath.    Cardiovascular:  Negative for palpitations.   Neurological:  Negative for dizziness and headaches.       Objective:    Blood pressure 136/80, pulse 64, height 5' 7" (1.702 m), weight 98.2 kg (216 lb 7.9 oz), SpO2 100%.   Physical Exam  Constitutional:       General: She is not in acute distress.  HENT:      Head: Normocephalic.      Mouth/Throat:      Pharynx: Oropharynx is clear.   Cardiovascular:      Rate and Rhythm: Normal rate and regular rhythm.      Heart " sounds: Normal heart sounds. No murmur heard.     No friction rub. No gallop.   Pulmonary:      Effort: Pulmonary effort is normal. No respiratory distress.      Breath sounds: Normal breath sounds.   Abdominal:      General: Bowel sounds are normal.      Palpations: Abdomen is soft. There is no mass.      Tenderness: There is no abdominal tenderness.      Comments: Healing surgical scars    Skin:     Findings: No erythema.   Neurological:      Mental Status: She is alert.         Assessment:       1. Primary hypertension    2. Endometrial cancer    3. Encounter for screening mammogram for breast cancer    4. SVT (supraventricular tachycardia)    5. Sepsis    6. Otitis of right ear        Plan:       Cherri was seen today for hospital follow up.    Diagnoses and all orders for this visit:    Primary hypertension  Blood pressure acceptable she remains on medicine denies need for refills  Currently on amloidpine and losartan/hctz     Endometrial cancer  Recent diagnosis following with Gynecology Oncology    Encounter for screening mammogram for breast cancer  -     Mammo Digital Screening Bilat w/ Wilfred; Future    SVT (supraventricular tachycardia)  During recent hospitalization for sepsis but would recommend a cardiology follow-up referral placed  -     Ambulatory referral/consult to Cardiology; Future    Sepsis  Recent episode of she was treated and the source of sepsis was felt to be her endometrial cancer she has had no fevers since discharge  Sp hysterectomy she continue to follow with gynecology/oncology     Otitis of right ear  Discussed conservative measures Flonase and amoxicillin course     Other orders  -     Discontinue: amoxicillin (AMOXIL) 500 MG Tab; Take 1 tablet (500 mg total) by mouth 2 (two) times daily. for 15 days  -     amoxicillin (AMOXIL) 500 MG Tab; Take 1 tablet (500 mg total) by mouth 2 (two) times daily. for 7 days  -     acetaminophen (TYLENOL) 650 MG TbSR; Take 1 tablet (650 mg total) by  mouth every 8 (eight) hours as needed (pain).  -     fluticasone propionate (FLONASE) 50 mcg/actuation nasal spray; 1 spray (50 mcg total) by Each Nostril route once daily.    She will check with her insurance where she can go for her test and primary care as attempt to order some testing for her showed that she was out of network she will clarify with her insurance I did place a mammogram order in for her for scheduling as well    A follow-up appoint with labs prior arrange but she will verify with her insurance PCP

## 2024-05-11 LAB
DNA RANGE(S) EXAMINED NAR: NORMAL
DNA RANGE(S) EXAMINED NAR: NORMAL
GENE DIS ANL INTERP-IMP: NORMAL
GENE DIS ANL INTERP-IMP: POSITIVE
GENE DIS ASSESSED: NORMAL
GENE DIS ASSESSED: NORMAL
GENE MUT TESTED BLD/T: 5.3 M/MB
MSI CA SPEC-IMP: NORMAL
MSI CA SPEC-IMP: NOT DETECTED
REASON FOR STUDY: NORMAL
REASON FOR STUDY: NORMAL
TEMPUS AMENDMENTNOTE1: NORMAL
TEMPUS FUSIONADDENDUM: NORMAL
TEMPUS LCA: NORMAL
TEMPUS LCA: NORMAL
TEMPUS PORTAL: NORMAL
TEMPUS PORTAL: NORMAL
TEMPUS THERAPY1: NORMAL
TEMPUS THERAPYCOUNT: 1
TEMPUS TRIAL1: NORMAL
TEMPUS TRIAL2: NORMAL
TEMPUS TRIAL3: NORMAL
TEMPUS TRIALCOUNT: 3

## 2024-05-14 ENCOUNTER — TELEPHONE (OUTPATIENT)
Dept: GYNECOLOGIC ONCOLOGY | Facility: CLINIC | Age: 69
End: 2024-05-14
Payer: MEDICARE

## 2024-05-14 LAB
FINAL PATHOLOGIC DIAGNOSIS: NORMAL
GROSS: NORMAL
Lab: NORMAL
MICROSCOPIC EXAM: NORMAL
SUPPLEMENTAL DIAGNOSIS: NORMAL

## 2024-05-14 NOTE — PROGRESS NOTES
"Subjective:      Patient ID: Cherri Gilmore is a 69 y.o. female.    Chief Complaint: No chief complaint on file.      HPI  S/p RTLH/BSO/SLN 2024  Stage IIIA2, grade 3 mixed carcinoma, tumor size 11.5cm, 100% myometrial invasion, extensive LVSI, negative lymph nodes.   P53 mutated  MMRp  HER2 0+  ER/WI-  MGS KRAS    Presents today for post operative visit. Recovering appropriately from surgery. Up and about, eating, +BM.   ___________________________  Patient reported PMB and had EMBx on 3/7/2024 at OSH. Biopsy was consistent with high grade endometrial adenocarcinoma, FIGO grade 3. No features suggestive of serous morphology, although mixed mullerian tumor could not be excluded. Tumor positive for "vimentin, pankeratin, WT1 (predominantly cytoplasmic positivity), PAX8, and p16. There is patchy strong positivity for p63. Estrogen receptor positivity is 1-2%. Although there is strong positivity for p53, the pattern is mixed and appears to be non mutated. The Ki-67 proliferation index is markedly increased (focally up to 80%). There is negative staining for progesterone receptor, CEA-M, CD10, and Napsin A."     CT CAP 3/14/2024 with enlarged uterus, however no obvious evidence of metastatic disease.     Prior abdominal surgeries include BTL and cholecystectomy.  x 5.     Recently admitted for sepsis 3/14 of unclear source. Recovered with antibiotics therapy and resuscitation.     Review of Systems   Constitutional:  Negative for appetite change, chills, fatigue and fever.   HENT:  Negative for mouth sores.    Respiratory:  Negative for cough and shortness of breath.    Cardiovascular:  Negative for leg swelling.   Gastrointestinal:  Negative for abdominal pain, blood in stool, constipation and diarrhea.   Endocrine: Negative for cold intolerance.   Genitourinary:  Negative for dysuria and vaginal bleeding.   Musculoskeletal:  Negative for myalgias.   Skin:  Negative for rash.   Allergic/Immunologic: " Negative.    Neurological:  Negative for weakness and numbness.   Hematological:  Negative for adenopathy. Does not bruise/bleed easily.   Psychiatric/Behavioral:  Negative for confusion.        Past Medical History:   Diagnosis Date    Adenocarcinoma of endometrium     Essential (primary) hypertension      Past Surgical History:   Procedure Laterality Date    DILATION AND CURETTAGE OF UTERUS      LYMPH NODE BIOPSY N/A 4/12/2024    Procedure: BIOPSY, LYMPH NODE;  Surgeon: Morena Briscoe MD;  Location: [facility];  Service: OB/GYN;  Laterality: N/A;    MAPPING, LYMPH NODE, SENTINEL N/A 4/12/2024    Procedure: MAPPING, LYMPH NODE, SENTINEL;  Surgeon: Morena Briscoe MD;  Location: [facility] OR;  Service: OB/GYN;  Laterality: N/A;    ROBOT-ASSISTED LAPAROSCOPIC ABDOMINAL HYSTERECTOMY USING DA CAMPBELL XI N/A 4/12/2024    Procedure: XI ROBOTIC HYSTERECTOMY;  Surgeon: Morena Birscoe MD;  Location: [facility];  Service: OB/GYN;  Laterality: N/A;  2.5 hr case / surgery admit due to insurance    ROBOT-ASSISTED LAPAROSCOPIC SALPINGO-OOPHORECTOMY USING DA CAMPBELL XI Bilateral 4/12/2024    Procedure: XI ROBOTIC SALPINGO-OOPHORECTOMY;  Surgeon: Morena Briscoe MD;  Location: [facility];  Service: OB/GYN;  Laterality: Bilateral;    TUBAL LIGATION       Family History   Problem Relation Name Age of Onset    Hypertension Mother      Diabetes Mother      Stroke Father      Breast cancer Sister      Stomach cancer Brother       Social History     Socioeconomic History    Marital status: Single   Tobacco Use    Smoking status: Never    Smokeless tobacco: Never   Substance and Sexual Activity    Alcohol use: Never    Drug use: Never    Sexual activity: Not Currently     Social Determinants of Health     Financial Resource Strain: Low Risk  (3/17/2024)    Overall Financial Resource Strain (CARDIA)     Difficulty of Paying Living Expenses: Not very hard   Food Insecurity: No Food Insecurity (5/2/2024)    Hunger Vital Sign     Worried About  Running Out of Food in the Last Year: Never true     Ran Out of Food in the Last Year: Never true   Transportation Needs: No Transportation Needs (5/2/2024)    PRAPARE - Transportation     Lack of Transportation (Medical): No     Lack of Transportation (Non-Medical): No   Physical Activity: Insufficiently Active (5/2/2024)    Exercise Vital Sign     Days of Exercise per Week: 5 days     Minutes of Exercise per Session: 20 min   Stress: No Stress Concern Present (5/2/2024)    [employer] - Occupational Stress Questionnaire     Feeling of Stress : Not at all     Current Outpatient Medications   Medication Sig    acetaminophen (TYLENOL) 650 MG TbSR Take 1 tablet (650 mg total) by mouth every 8 (eight) hours as needed (pain).    amLODIPine (NORVASC) 10 MG tablet Take 10 mg by mouth once daily.    docusate sodium (COLACE) 100 MG capsule Take 1 capsule (100 mg total) by mouth 2 (two) times daily.    fluticasone propionate (FLONASE) 50 mcg/actuation nasal spray 1 spray (50 mcg total) by Each Nostril route once daily.    losartan-hydrochlorothiazide 100-25 mg (HYZAAR) 100-25 mg per tablet Take 1 tablet by mouth once daily.    oxyCODONE (ROXICODONE) 5 MG immediate release tablet Take 1 tablet (5 mg total) by mouth every 4 (four) hours as needed for Pain.     No current facility-administered medications for this visit.     Review of patient's allergies indicates:   Allergen Reactions    Vicodin [hydrocodone-acetaminophen] Anxiety     Can take tylenol       Objective:   Physical Exam:   Constitutional: She is oriented to person, place, and time. She appears well-developed and well-nourished.    HENT:   Head: Normocephalic and atraumatic.    Eyes: Pupils are equal, round, and reactive to light. EOM are normal.    Neck: No thyromegaly present.    Cardiovascular:  Normal rate, regular rhythm and intact distal pulses.             Pulmonary/Chest: Effort normal and breath sounds normal. No respiratory  distress. She has no wheezes.        Abdominal: Soft. Bowel sounds are normal. She exhibits no distension and no mass. There is no abdominal tenderness.             Musculoskeletal: Normal range of motion and moves all extremeties.      Lymphadenopathy:     She has no cervical adenopathy.        Right: No supraclavicular adenopathy present.        Left: No supraclavicular adenopathy present.    Neurological: She is alert and oriented to person, place, and time.    Skin: Skin is warm and dry. No rash noted.    Psychiatric: She has a normal mood and affect.       Assessment:     1. Endometrial adenocarcinoma    2. H/o RA-TLH/BSO/SLND          Plan:     Orders Placed This Encounter   Procedures    IR Tunneled Cath Placement With Port (XPD)    Ambulatory referral/consult to Interventional RAD    Ambulatory referral/consult to Radiation Oncology       Recovering appropriately from surgery.   Reviewed pathology in detail and recommendations for adjuvant systemic chemotherapy +/- RT.   Continue post operative care and chemotherapy treatment planning.

## 2024-05-20 ENCOUNTER — HOSPITAL ENCOUNTER (OUTPATIENT)
Dept: RADIOLOGY | Facility: HOSPITAL | Age: 69
Discharge: HOME | End: 2024-05-20
Attending: INTERNAL MEDICINE

## 2024-05-20 VITALS — WEIGHT: 218 LBS | BODY MASS INDEX: 40.12 KG/M2 | HEIGHT: 62 IN

## 2024-05-20 DIAGNOSIS — Z12.31 ENCOUNTER FOR SCREENING MAMMOGRAM FOR BREAST CANCER: ICD-10-CM

## 2024-05-20 PROCEDURE — 77063 BREAST TOMOSYNTHESIS BI: CPT | Mod: TC

## 2024-05-21 ENCOUNTER — TELEPHONE (OUTPATIENT)
Dept: GYNECOLOGIC ONCOLOGY | Facility: CLINIC | Age: 69
End: 2024-05-21
Payer: MEDICARE

## 2024-05-21 DIAGNOSIS — C54.1 ENDOMETRIAL ADENOCARCINOMA: Primary | ICD-10-CM

## 2024-05-21 DIAGNOSIS — Z51.11 ENCOUNTER FOR CHEMOTHERAPY MANAGEMENT: ICD-10-CM

## 2024-05-21 RX ORDER — DEXAMETHASONE 4 MG/1
8 TABLET ORAL DAILY
Qty: 20 TABLET | Refills: 0 | Status: SHIPPED | OUTPATIENT
Start: 2024-06-06 | End: 2024-05-24

## 2024-05-21 RX ORDER — PROCHLORPERAZINE MALEATE 5 MG
5 TABLET ORAL EVERY 6 HOURS PRN
Qty: 30 TABLET | Refills: 1 | Status: SHIPPED | OUTPATIENT
Start: 2024-05-21 | End: 2025-05-21

## 2024-05-21 RX ORDER — OLANZAPINE 5 MG/1
5 TABLET ORAL NIGHTLY
Qty: 30 TABLET | Refills: 2 | Status: SHIPPED | OUTPATIENT
Start: 2024-05-21 | End: 2025-05-21

## 2024-05-21 NOTE — TELEPHONE ENCOUNTER
Call placed to patient to discuss upcoming chemotherapy scheduling. Informed patient chemotherapy is approved. Desired location of treatment/labs discussed and patient scheduled on 6/5 at [facility]. Reviewed frequency of regimen and preferred pharmacy for pre-medications, if needed. ED precautions reviewed and all questions answered. All upcoming appointments reviewed and patient verbalized understanding.     Chemotherapy education materials to be provided in chemo class scheduled on 5/27/2024.

## 2024-05-24 DIAGNOSIS — C54.1 ENDOMETRIAL ADENOCARCINOMA: ICD-10-CM

## 2024-05-24 DIAGNOSIS — Z51.11 ENCOUNTER FOR CHEMOTHERAPY MANAGEMENT: ICD-10-CM

## 2024-05-24 RX ORDER — DEXAMETHASONE 4 MG/1
TABLET ORAL
Qty: 20 TABLET | Refills: 10 | Status: SHIPPED | OUTPATIENT
Start: 2024-05-24

## 2024-05-27 ENCOUNTER — CLINICAL SUPPORT (OUTPATIENT)
Dept: GYNECOLOGIC ONCOLOGY | Facility: CLINIC | Age: 69
End: 2024-05-27
Payer: MEDICARE

## 2024-05-27 DIAGNOSIS — C54.1 ENDOMETRIAL ADENOCARCINOMA: Primary | ICD-10-CM

## 2024-05-27 NOTE — PROGRESS NOTES
Met with patient in person to discuss upcoming systemic therapy initiation. Patient spoke fluent English and did not require or desire a .  Discussed that therapy regimen prescribed involves the following drugs: Carboplatin/Taxol, and timeline of therapy administration. Went over what to expect on first day of treatment, including what to bring with you, visitor policy, and infusion suite guidelines. Also discussed supportive and shared services available to patient, including social work, financial counseling, oncology nutrition, integrative oncology, and oncology psychology.      Covered with patient potential side effects and symptom management. Reviewed supportive medications that will be given before, during, and after treatment. Also stressed that other side effects are possible outside of those listed. Spent additional time on signs of infection, infection prevention, and proper nutrition/hydration.    Education provided to patient via chemotherapy education binder and [facility] education handouts. Also provided contact information for clinic and information related to [facility] communication. Discussed communication process for after-hours needs and some common scenarios in which patient should call provider for guidance vs. immediately report to the emergency room.     Finally, patient was given my contact information and role of oncology navigator was discussed. Encouraged patient to call with any questions, concerns, or needs. Patient verbalized understanding of all above information. Pt also provided with print out of all upcoming appointments and all appointments discussed in detail.

## 2024-05-29 ENCOUNTER — PATIENT MESSAGE (OUTPATIENT)
Dept: INTERVENTIONAL RADIOLOGY/VASCULAR | Facility: HOSPITAL | Age: 69
End: 2024-05-29
Payer: MEDICARE

## 2024-05-29 ENCOUNTER — LAB VISIT (OUTPATIENT)
Dept: LAB | Facility: OTHER | Age: 69
End: 2024-05-29
Attending: OBSTETRICS & GYNECOLOGY
Payer: MEDICARE

## 2024-05-29 ENCOUNTER — OFFICE VISIT (OUTPATIENT)
Dept: GYNECOLOGIC ONCOLOGY | Facility: CLINIC | Age: 69
End: 2024-05-29
Payer: MEDICARE

## 2024-05-29 VITALS
HEART RATE: 80 BPM | DIASTOLIC BLOOD PRESSURE: 63 MMHG | HEIGHT: 67 IN | SYSTOLIC BLOOD PRESSURE: 129 MMHG | BODY MASS INDEX: 34.21 KG/M2 | WEIGHT: 218 LBS

## 2024-05-29 DIAGNOSIS — Z01.818 EXAMINATION PRIOR TO CHEMOTHERAPY: ICD-10-CM

## 2024-05-29 DIAGNOSIS — C54.1 ENDOMETRIAL ADENOCARCINOMA: ICD-10-CM

## 2024-05-29 DIAGNOSIS — C54.1 ENDOMETRIAL ADENOCARCINOMA: Primary | ICD-10-CM

## 2024-05-29 DIAGNOSIS — D49.9 NEOPLASM OF UNSPECIFIED BEHAVIOR OF UNSPECIFIED SITE: ICD-10-CM

## 2024-05-29 LAB
ANION GAP SERPL CALC-SCNC: 8 MMOL/L (ref 8–16)
BASOPHILS # BLD AUTO: 0.04 K/UL (ref 0–0.2)
BASOPHILS NFR BLD: 0.6 % (ref 0–1.9)
BUN SERPL-MCNC: 21 MG/DL (ref 8–23)
CALCIUM SERPL-MCNC: 9.5 MG/DL (ref 8.7–10.5)
CHLORIDE SERPL-SCNC: 109 MMOL/L (ref 95–110)
CO2 SERPL-SCNC: 23 MMOL/L (ref 23–29)
CREAT SERPL-MCNC: 1.3 MG/DL (ref 0.5–1.4)
DIFFERENTIAL METHOD BLD: ABNORMAL
EOSINOPHIL # BLD AUTO: 0.2 K/UL (ref 0–0.5)
EOSINOPHIL NFR BLD: 3.4 % (ref 0–8)
ERYTHROCYTE [DISTWIDTH] IN BLOOD BY AUTOMATED COUNT: 14.4 % (ref 11.5–14.5)
EST. GFR  (NO RACE VARIABLE): 45 ML/MIN/1.73 M^2
GLUCOSE SERPL-MCNC: 113 MG/DL (ref 70–110)
HCT VFR BLD AUTO: 31 % (ref 37–48.5)
HGB BLD-MCNC: 9.5 G/DL (ref 12–16)
IMM GRANULOCYTES # BLD AUTO: 0.01 K/UL (ref 0–0.04)
IMM GRANULOCYTES NFR BLD AUTO: 0.1 % (ref 0–0.5)
INR PPP: 0.9 (ref 0.8–1.2)
LYMPHOCYTES # BLD AUTO: 2.2 K/UL (ref 1–4.8)
LYMPHOCYTES NFR BLD: 31.1 % (ref 18–48)
MCH RBC QN AUTO: 24.9 PG (ref 27–31)
MCHC RBC AUTO-ENTMCNC: 30.6 G/DL (ref 32–36)
MCV RBC AUTO: 81 FL (ref 82–98)
MONOCYTES # BLD AUTO: 0.6 K/UL (ref 0.3–1)
MONOCYTES NFR BLD: 8.4 % (ref 4–15)
NEUTROPHILS # BLD AUTO: 4 K/UL (ref 1.8–7.7)
NEUTROPHILS NFR BLD: 56.4 % (ref 38–73)
NRBC BLD-RTO: 0 /100 WBC
PLATELET # BLD AUTO: 221 K/UL (ref 150–450)
PMV BLD AUTO: 10.8 FL (ref 9.2–12.9)
POTASSIUM SERPL-SCNC: 3.7 MMOL/L (ref 3.5–5.1)
PROTHROMBIN TIME: 10.8 SEC (ref 9–12.5)
RBC # BLD AUTO: 3.81 M/UL (ref 4–5.4)
SODIUM SERPL-SCNC: 140 MMOL/L (ref 136–145)
WBC # BLD AUTO: 7.02 K/UL (ref 3.9–12.7)

## 2024-05-29 PROCEDURE — 3078F DIAST BP <80 MM HG: CPT | Mod: CPTII,S$GLB,, | Performed by: OBSTETRICS & GYNECOLOGY

## 2024-05-29 PROCEDURE — 99215 OFFICE O/P EST HI 40 MIN: CPT | Mod: 24,S$GLB,, | Performed by: OBSTETRICS & GYNECOLOGY

## 2024-05-29 PROCEDURE — 3044F HG A1C LEVEL LT 7.0%: CPT | Mod: CPTII,S$GLB,, | Performed by: OBSTETRICS & GYNECOLOGY

## 2024-05-29 PROCEDURE — 36415 COLL VENOUS BLD VENIPUNCTURE: CPT | Performed by: NURSE PRACTITIONER

## 2024-05-29 PROCEDURE — 1101F PT FALLS ASSESS-DOCD LE1/YR: CPT | Mod: CPTII,S$GLB,, | Performed by: OBSTETRICS & GYNECOLOGY

## 2024-05-29 PROCEDURE — 80048 BASIC METABOLIC PNL TOTAL CA: CPT | Performed by: NURSE PRACTITIONER

## 2024-05-29 PROCEDURE — 1159F MED LIST DOCD IN RCRD: CPT | Mod: CPTII,S$GLB,, | Performed by: OBSTETRICS & GYNECOLOGY

## 2024-05-29 PROCEDURE — 1126F AMNT PAIN NOTED NONE PRSNT: CPT | Mod: CPTII,S$GLB,, | Performed by: OBSTETRICS & GYNECOLOGY

## 2024-05-29 PROCEDURE — 85610 PROTHROMBIN TIME: CPT | Performed by: NURSE PRACTITIONER

## 2024-05-29 PROCEDURE — 99999 PR PBB SHADOW E&M-EST. PATIENT-LVL III: CPT | Mod: PBBFAC,,, | Performed by: OBSTETRICS & GYNECOLOGY

## 2024-05-29 PROCEDURE — 3074F SYST BP LT 130 MM HG: CPT | Mod: CPTII,S$GLB,, | Performed by: OBSTETRICS & GYNECOLOGY

## 2024-05-29 PROCEDURE — 3288F FALL RISK ASSESSMENT DOCD: CPT | Mod: CPTII,S$GLB,, | Performed by: OBSTETRICS & GYNECOLOGY

## 2024-05-29 PROCEDURE — 3008F BODY MASS INDEX DOCD: CPT | Mod: CPTII,S$GLB,, | Performed by: OBSTETRICS & GYNECOLOGY

## 2024-05-29 PROCEDURE — 85025 COMPLETE CBC W/AUTO DIFF WBC: CPT | Performed by: NURSE PRACTITIONER

## 2024-05-29 NOTE — PROGRESS NOTES
"Subjective     Patient ID: Cherri Gilmore is a 69 y.o. female.    Chief Complaint: Post-op Evaluation (6 week post op )    HPI  S/p RTLH/BSO/SLN 2024  Stage IIIA2, grade 3 mixed carcinoma, tumor size 11.5cm, 100% myometrial invasion, extensive LVSI, negative lymph nodes.   P53 mutated  MMRp  HER2 0+  ER/NC-  NGS KRAS     IR port placed .     Recovering appropriately from surgery.     Here today to discuss next steps in treatment- chemotherapy.   ___________________________  Patient reported PMB and had EMBx on 3/7/2024 at OSH. Biopsy was consistent with high grade endometrial adenocarcinoma, FIGO grade 3. No features suggestive of serous morphology, although mixed mullerian tumor could not be excluded. Tumor positive for "vimentin, pankeratin, WT1 (predominantly cytoplasmic positivity), PAX8, and p16. There is patchy strong positivity for p63. Estrogen receptor positivity is 1-2%. Although there is strong positivity for p53, the pattern is mixed and appears to be non mutated. The Ki-67 proliferation index is markedly increased (focally up to 80%). There is negative staining for progesterone receptor, CEA-M, CD10, and Napsin A."      CT CAP 3/14/2024 with enlarged uterus, however no obvious evidence of metastatic disease.      Prior abdominal surgeries include BTL and cholecystectomy.  x 5.      Recently admitted for sepsis 3/14 of unclear source. Recovered with antibiotics therapy and resuscitation.          Review of Systems   Constitutional:  Negative for appetite change, chills, fatigue and fever.   HENT:  Negative for mouth sores.    Respiratory:  Negative for cough and shortness of breath.    Cardiovascular:  Negative for leg swelling.   Gastrointestinal:  Negative for abdominal pain, blood in stool, constipation and diarrhea.   Endocrine: Negative for cold intolerance.   Genitourinary:  Negative for dysuria and vaginal bleeding.   Musculoskeletal:  Negative for myalgias.   Integumentary:  " Negative for rash.   Allergic/Immunologic: Negative.    Neurological:  Negative for weakness and numbness.   Hematological:  Negative for adenopathy. Does not bruise/bleed easily.   Psychiatric/Behavioral:  Negative for confusion.           Objective     Physical Exam  Vitals reviewed. Exam conducted with a chaperone present.   Constitutional:       Appearance: Normal appearance.   HENT:      Head: Normocephalic and atraumatic.   Eyes:      Extraocular Movements: Extraocular movements intact.      Conjunctiva/sclera: Conjunctivae normal.      Pupils: Pupils are equal, round, and reactive to light.   Pulmonary:      Effort: Pulmonary effort is normal. No respiratory distress.   Abdominal:      General: There is no distension.      Palpations: Abdomen is soft.      Tenderness: There is no abdominal tenderness.   Musculoskeletal:         General: Normal range of motion.   Skin:     General: Skin is warm and dry.   Neurological:      General: No focal deficit present.      Mental Status: She is alert and oriented to person, place, and time. Mental status is at baseline.   Psychiatric:         Mood and Affect: Mood normal.         Behavior: Behavior normal.         Thought Content: Thought content normal.         Judgment: Judgment normal.          Assessment and Plan     1. Endometrial adenocarcinoma    2. Examination prior to chemotherapy    Other orders  -     fosaprepitant 150 mg in sodium chloride 0.9% 150 mL IVPB  -     palonosetron 0.25mg/dexAMETHasone 20mg in NS IVPB 0.25 mg 50 mL  -     diphenhydrAMINE (BENADRYL) 50 mg in NS 50 mL IVPB  -     famotidine (PF) injection 20 mg  -     PACLitaxeL (TAXOL) 175 mg/m2 = 378 mg in sodium chloride 0.9% 500 mL chemo infusion  -     CARBOplatin (PARAPLATIN) 445 mg in sodium chloride 0.9% 294.5 mL chemo infusion  -     sodium chloride 0.9% bolus 500 mL 500 mL  -     prochlorperazine injection Soln 5 mg  -     EPINEPHrine (EPIPEN) 0.3 mg/0.3 mL pen injection 0.3 mg  -      diphenhydrAMINE injection 50 mg  -     hydrocortisone sodium succinate injection 100 mg  -     sodium chloride 0.9% flush 10 mL  -     heparin, porcine (PF) 100 unit/mL injection flush 500 Units  -     alteplase injection 2 mg      Recovered appropriately from surgery.   We discussed NCCN guidelines supporting adjuvant platinum based systemic chemotherapy +/- RT for Stage IIIA2 grade 3 mixed endometrial carcinoma (MMRp and HER2 negative) and she was provided a copy of this document.     The R/B/I of chemotherapy were explained to the patient to include but not limited to hair and skin changes, bone marrow damage (anemia, thrombocytopenia, immune suppression, neutropenia), allergic reactions, diarrhea, constipation, mouth sores, neuropathy, secondary cancers, damage at injection sites, cardiotoxicity and possible death. The patient voiced understanding, all questions were answered and consents were signed.    Port placed 5/30.     Okay to treat C1.   RTC prechemo or sooner if needed.     I spent approximately 45 minutes reviewing the available records and evaluating the patient, out of which over 50% of the time was spent face to face with the patient in counseling and coordinating this patient's care.               No follow-ups on file.

## 2024-05-29 NOTE — Clinical Note
Getting C1 6/5. Orders signed with PCP labs from 5/29. Please schedule C2. Has a port now. Please include a . TY

## 2024-05-30 ENCOUNTER — HOSPITAL ENCOUNTER (OUTPATIENT)
Dept: INTERVENTIONAL RADIOLOGY/VASCULAR | Facility: HOSPITAL | Age: 69
Discharge: HOME | End: 2024-05-30
Attending: OBSTETRICS & GYNECOLOGY
Payer: MEDICARE

## 2024-05-30 DIAGNOSIS — D49.9 NEOPLASM OF UNSPECIFIED BEHAVIOR OF UNSPECIFIED SITE: ICD-10-CM

## 2024-05-30 DIAGNOSIS — C54.1 ENDOMETRIAL ADENOCARCINOMA: Primary | ICD-10-CM

## 2024-05-30 RX ORDER — LIDOCAINE HYDROCHLORIDE 10 MG/ML
1 INJECTION, SOLUTION EPIDURAL; INFILTRATION; INTRACAUDAL; PERINEURAL ONCE
Status: DISCONTINUED | OUTPATIENT
Start: 2024-05-30 | End: 2024-05-31 | Stop reason: HOSPADM

## 2024-05-30 RX ORDER — SODIUM CHLORIDE 9 MG/ML
INJECTION, SOLUTION INTRAVENOUS CONTINUOUS
Status: DISCONTINUED | OUTPATIENT
Start: 2024-05-30 | End: 2024-05-31 | Stop reason: HOSPADM

## 2024-05-30 NOTE — PROGRESS NOTES
Patient procedure cancelled due to patient eating 1 hour ago. Pt reports that she did not receive a pre op phone call. Dr. Arias notified.     Patient gave permission to speak to son Miguel Gilmore ([phone]) for pre-op arrival time and instructions.

## 2024-06-01 RX ORDER — EPINEPHRINE 0.3 MG/.3ML
0.3 INJECTION SUBCUTANEOUS ONCE AS NEEDED
Status: CANCELLED | OUTPATIENT
Start: 2024-06-01

## 2024-06-01 RX ORDER — PROCHLORPERAZINE EDISYLATE 5 MG/ML
5 INJECTION INTRAMUSCULAR; INTRAVENOUS ONCE AS NEEDED
Status: CANCELLED
Start: 2024-06-01

## 2024-06-01 RX ORDER — HEPARIN 100 UNIT/ML
500 SYRINGE INTRAVENOUS
Status: CANCELLED | OUTPATIENT
Start: 2024-06-01

## 2024-06-01 RX ORDER — DIPHENHYDRAMINE HYDROCHLORIDE 50 MG/ML
50 INJECTION INTRAMUSCULAR; INTRAVENOUS ONCE AS NEEDED
Status: CANCELLED | OUTPATIENT
Start: 2024-06-01

## 2024-06-01 RX ORDER — FAMOTIDINE 10 MG/ML
20 INJECTION INTRAVENOUS
Status: CANCELLED | OUTPATIENT
Start: 2024-06-01

## 2024-06-01 RX ORDER — SODIUM CHLORIDE 0.9 % (FLUSH) 0.9 %
10 SYRINGE (ML) INJECTION
Status: CANCELLED | OUTPATIENT
Start: 2024-06-01

## 2024-06-03 ENCOUNTER — TELEPHONE (OUTPATIENT)
Dept: GYNECOLOGIC ONCOLOGY | Facility: CLINIC | Age: 69
End: 2024-06-03
Payer: MEDICARE

## 2024-06-03 ENCOUNTER — TELEPHONE (OUTPATIENT)
Dept: INFUSION THERAPY | Facility: OTHER | Age: 69
End: 2024-06-03
Payer: MEDICARE

## 2024-06-03 DIAGNOSIS — Z51.12 ENCOUNTER FOR ANTINEOPLASTIC CHEMOTHERAPY AND IMMUNOTHERAPY: Primary | ICD-10-CM

## 2024-06-03 DIAGNOSIS — Z51.11 ENCOUNTER FOR ANTINEOPLASTIC CHEMOTHERAPY AND IMMUNOTHERAPY: Primary | ICD-10-CM

## 2024-06-03 DIAGNOSIS — C57.7 MALIGNANT NEOPLASM OF OTHER SPECIFIED FEMALE GENITAL ORGANS: ICD-10-CM

## 2024-06-03 NOTE — TELEPHONE ENCOUNTER
----- Message from Tiki Kim sent at 6/3/2024 10:55 AM CDT -----  Type:  Sooner Appointment Request    Caller is requesting a sooner appointment.  Caller declined first available appointment listed below.  Caller will not accept being placed on the waitlist and is requesting a message be sent to doctor.  Name of Caller:pt   When is the first available appointment?  Symptoms:Reschedule Infusion due to not having port placed   Would the patient rather a call back or a response via [facility]? Call   Best Call Back Number:[phone]  Additional Information:

## 2024-06-03 NOTE — TELEPHONE ENCOUNTER
----- Message from Tiki Kim sent at 6/3/2024 10:52 AM CDT -----  Type: call back     Who Called:pt    Does the patient know what this is regarding?:Port Placement   Would the patient rather a call back or a response via [facility]? Call   Best Call Back Number:[phone] or   Additional Information:

## 2024-06-05 ENCOUNTER — INFUSION (OUTPATIENT)
Dept: INFUSION THERAPY | Facility: OTHER | Age: 69
End: 2024-06-05
Attending: OBSTETRICS & GYNECOLOGY
Payer: MEDICARE

## 2024-06-05 VITALS
WEIGHT: 219.38 LBS | HEIGHT: 67 IN | DIASTOLIC BLOOD PRESSURE: 77 MMHG | TEMPERATURE: 98 F | OXYGEN SATURATION: 100 % | SYSTOLIC BLOOD PRESSURE: 135 MMHG | HEART RATE: 76 BPM | BODY MASS INDEX: 34.43 KG/M2 | RESPIRATION RATE: 16 BRPM

## 2024-06-05 DIAGNOSIS — C54.1 ENDOMETRIAL ADENOCARCINOMA: Primary | ICD-10-CM

## 2024-06-05 PROCEDURE — 96417 CHEMO IV INFUS EACH ADDL SEQ: CPT

## 2024-06-05 PROCEDURE — 96415 CHEMO IV INFUSION ADDL HR: CPT

## 2024-06-05 PROCEDURE — 96367 TX/PROPH/DG ADDL SEQ IV INF: CPT

## 2024-06-05 PROCEDURE — 25000003 PHARM REV CODE 250: Performed by: OBSTETRICS & GYNECOLOGY

## 2024-06-05 PROCEDURE — 63600175 PHARM REV CODE 636 W HCPCS: Performed by: OBSTETRICS & GYNECOLOGY

## 2024-06-05 PROCEDURE — 96375 TX/PRO/DX INJ NEW DRUG ADDON: CPT

## 2024-06-05 PROCEDURE — 96413 CHEMO IV INFUSION 1 HR: CPT

## 2024-06-05 PROCEDURE — 96361 HYDRATE IV INFUSION ADD-ON: CPT

## 2024-06-05 RX ORDER — FAMOTIDINE 10 MG/ML
20 INJECTION INTRAVENOUS
Status: COMPLETED | OUTPATIENT
Start: 2024-06-05 | End: 2024-06-05

## 2024-06-05 RX ORDER — EPINEPHRINE 0.3 MG/.3ML
0.3 INJECTION SUBCUTANEOUS ONCE AS NEEDED
Status: COMPLETED | OUTPATIENT
Start: 2024-06-05 | End: 2024-06-05

## 2024-06-05 RX ORDER — HEPARIN 100 UNIT/ML
500 SYRINGE INTRAVENOUS
Status: DISCONTINUED | OUTPATIENT
Start: 2024-06-05 | End: 2024-06-05 | Stop reason: HOSPADM

## 2024-06-05 RX ORDER — PROCHLORPERAZINE EDISYLATE 5 MG/ML
5 INJECTION INTRAMUSCULAR; INTRAVENOUS ONCE AS NEEDED
Status: DISCONTINUED | OUTPATIENT
Start: 2024-06-05 | End: 2024-06-05 | Stop reason: HOSPADM

## 2024-06-05 RX ORDER — SODIUM CHLORIDE 0.9 % (FLUSH) 0.9 %
10 SYRINGE (ML) INJECTION
Status: DISCONTINUED | OUTPATIENT
Start: 2024-06-05 | End: 2024-06-05 | Stop reason: HOSPADM

## 2024-06-05 RX ORDER — DIPHENHYDRAMINE HYDROCHLORIDE 50 MG/ML
50 INJECTION INTRAMUSCULAR; INTRAVENOUS ONCE AS NEEDED
Status: COMPLETED | OUTPATIENT
Start: 2024-06-05 | End: 2024-06-05

## 2024-06-05 RX ADMIN — FOSAPREPITANT DIMEGLUMINE 150 MG: 150 INJECTION, POWDER, LYOPHILIZED, FOR SOLUTION INTRAVENOUS at 09:06

## 2024-06-05 RX ADMIN — DEXAMETHASONE SODIUM PHOSPHATE 0.25 MG: 4 INJECTION, SOLUTION INTRA-ARTICULAR; INTRALESIONAL; INTRAMUSCULAR; INTRAVENOUS; SOFT TISSUE at 09:06

## 2024-06-05 RX ADMIN — SODIUM CHLORIDE 500 ML: 9 INJECTION, SOLUTION INTRAVENOUS at 02:06

## 2024-06-05 RX ADMIN — FAMOTIDINE 20 MG: 10 INJECTION INTRAVENOUS at 09:06

## 2024-06-05 RX ADMIN — PACLITAXEL 378 MG: 6 INJECTION, SOLUTION INTRAVENOUS at 10:06

## 2024-06-05 RX ADMIN — DIPHENHYDRAMINE HYDROCHLORIDE 50 MG: 50 INJECTION, SOLUTION INTRAMUSCULAR; INTRAVENOUS at 10:06

## 2024-06-05 RX ADMIN — CARBOPLATIN 445 MG: 10 INJECTION, SOLUTION INTRAVENOUS at 01:06

## 2024-06-05 NOTE — PROGRESS NOTES
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 Gilmore is a 69-year-old female who was recently diagnosed with endometrial cancer after evaluation for postmenopausal bleeding for 1 year. An ultrasound revealed thickened endometrium. Endometrial biopsy on March 7, 2024 revealed focally necrotic high-grade endometrial adenocarcinoma, FIGO grade 3. A CT of the chest with contrast on March 16, 2024 is negative for metastatic disease. She underwent TAHBSO and lymph node sampling on April 12, 2024. Pathology revealed uterus with grade 3, mixed carcinoma with multiple morphologic and immunophenotype, p53 mutated, MMRp, measuring approximately 11.5 cm. There is involvement of the myometrium with invasion into greater than 47 mm out of a total depth of 48 mm (100%) with myoinvasive lower uterine segment involvement. Uterine serosa is positive for involvement. There is extensive lymphovascular involvement noted. The parametrial/paracervical margin is negative with the closest margin at 1.5 mm anteriorly. 1/1 right pelvic sentinel lymph node and 3/3 left sentinel lymph nodes are negative for involvement. Based on pathology report "The molecular findings support the histologic classification as a mixed carcinoma or carcinoma with mixed phenotypes". She is recommended to undergo adjuvant chemotherapy/ radiation to reduce her chance of recurrence. She is here today for discussion regarding that.    At present, patient is healing from the surgery without any unexpected side effects. She denies vaginal bleeding or discharge. She also denies dysuria, hematuria, rectal pain, or rectal bleeding, fever, night sweats, or weight loss. Her first cycle of chemotherapy was yesterday. She reports headaches since then.     REVIEW OF SYSTEMS:  As above.  In addition, patient denies headaches, visual problems, dizziness, chest pain, " shortness of breath, cough, nausea, vomiting, diarrhea, or any new bony pains. Patient also denies easy bruising,     ECO    PAST MEDICAL HISTORY:  Past Medical History:   Diagnosis Date    Adenocarcinoma of endometrium     Essential (primary) hypertension        PAST SURGICAL HISTORY:  Past Surgical History:   Procedure Laterality Date    DILATION AND CURETTAGE OF UTERUS      LYMPH NODE BIOPSY N/A 2024    Procedure: BIOPSY, LYMPH NODE;  Surgeon: Morena Briscoe MD;  Location: [facility] OR;  Service: OB/GYN;  Laterality: N/A;    MAPPING, LYMPH NODE, SENTINEL N/A 2024    Procedure: MAPPING, LYMPH NODE, SENTINEL;  Surgeon: Morena Briscoe MD;  Location: [facility] OR;  Service: OB/GYN;  Laterality: N/A;    ROBOT-ASSISTED LAPAROSCOPIC ABDOMINAL HYSTERECTOMY USING DA CAMPBELL XI N/A 2024    Procedure: XI ROBOTIC HYSTERECTOMY;  Surgeon: Morena Briscoe MD;  Location: [facility] OR;  Service: OB/GYN;  Laterality: N/A;  2.5 hr case / surgery admit due to insurance    ROBOT-ASSISTED LAPAROSCOPIC SALPINGO-OOPHORECTOMY USING DA CAMPBELL XI Bilateral 2024    Procedure: XI ROBOTIC SALPINGO-OOPHORECTOMY;  Surgeon: Morena Briscoe MD;  Location: [facility] OR;  Service: OB/GYN;  Laterality: Bilateral;    TUBAL LIGATION         ALLERGIES:   Review of patient's allergies indicates:   Allergen Reactions    Vicodin [hydrocodone-acetaminophen] Anxiety     Can take tylenol       MEDICATIONS:  Current Outpatient Medications   Medication Sig    acetaminophen (TYLENOL) 650 MG TbSR Take 1 tablet (650 mg total) by mouth every 8 (eight) hours as needed (pain).    amLODIPine (NORVASC) 10 MG tablet Take 10 mg by mouth once daily.    dexAMETHasone (DECADRON) 4 MG Tab TAKE 2 TABLETS (8MG) BY MOUTH ONCE DAILY - TAKE FOR 3 DAYS STARTING THE DAY AFTER YOU RECEIVE CHEMOTHERAPY    docusate sodium (COLACE) 100 MG capsule Take 1 capsule (100 mg total) by mouth 2 (two) times daily.    fluticasone propionate (FLONASE) 50 mcg/actuation nasal spray 1  spray (50 mcg total) by Each Nostril route once daily.    losartan-hydrochlorothiazide 100-25 mg (HYZAAR) 100-25 mg per tablet Take 1 tablet by mouth once daily.    OLANZapine (ZYPREXA) 5 MG tablet Take 1 tablet (5 mg total) by mouth every evening. Take for 4 days starting on the day you receive chemotherapy.    oxyCODONE (ROXICODONE) 5 MG immediate release tablet Take 1 tablet (5 mg total) by mouth every 4 (four) hours as needed for Pain.    prochlorperazine (COMPAZINE) 5 MG tablet Take 1 tablet (5 mg total) by mouth every 6 (six) hours as needed for Nausea.     No current facility-administered medications for this visit.       SOCIAL HISTORY:  Social History     Socioeconomic History    Marital status: Single   Tobacco Use    Smoking status: Never    Smokeless tobacco: Never   Substance and Sexual Activity    Alcohol use: Never    Drug use: Never    Sexual activity: Not Currently     Social Determinants of Health     Financial Resource Strain: Low Risk  (3/17/2024)    Overall Financial Resource Strain (CARDIA)     Difficulty of Paying Living Expenses: Not very hard   Food Insecurity: No Food Insecurity (5/2/2024)    Hunger Vital Sign     Worried About Running Out of Food in the Last Year: Never true     Ran Out of Food in the Last Year: Never true   Transportation Needs: No Transportation Needs (5/2/2024)    PRAPARE - Transportation     Lack of Transportation (Medical): No     Lack of Transportation (Non-Medical): No   Physical Activity: Insufficiently Active (5/2/2024)    Exercise Vital Sign     Days of Exercise per Week: 5 days     Minutes of Exercise per Session: 20 min   Stress: No Stress Concern Present (5/2/2024)    [facility] - Occupational Stress Questionnaire     Feeling of Stress : Not at all   Housing Stability: Unknown (5/2/2024)    Housing Stability Vital Sign     Unable to Pay for Housing in the Last Year: Patient declined       FAMILY HISTORY:  Family History   Problem  "Relation Name Age of Onset    Hypertension Mother      Diabetes Mother      Stroke Father      Breast cancer Sister      Stomach cancer Brother           PHYSICAL EXAMINATION:  Vitals:    06/06/24 1011   BP: 138/77   BP Location: Right arm   Patient Position: Sitting   Pulse: (!) 118   Resp: 20   Temp: 98.7 °F (37.1 °C)   SpO2: 100%   Weight: 101.6 kg (223 lb 15.8 oz)   Height: 5' 7" (1.702 m)   Body mass index is 35.08 kg/m².   GENERAL: Patient is alert and oriented, in no acute distress.  HEENT:Extraocular muscles are intact.  Oropharynx is clear without lesions.  There is no cervical or supraclavicular lymphadenopathy palpated.  No thyromegaly noted.  HEART: Regular rate and rhythm.  LUNGS: Clear to auscultation bilaterally.  ABDOMEN:Soft, nontender, nondistended, without hepatosplenomegaly.  Normoactive bowel sounds.  PELVIC EXAM: A speculum examination reveals healing vaginal cuff with suture visible. No abnormal masses palpated at the vaginal cuff or vagina.  EXTREMITIES: No clubbing, cyanosis, or edema.  NEUROLOGICAL: Cranial nerve II through XII grossly intact.  Sensation is intact.  Strength is 5 out of 5 in the upper and lower extremities bilaterally.     ASSESSMENT:   This is a 69-year-old female with FIGO stage IIIA, grade 3, mixed carcinoma of the uterus who underwent TAHBSO and lymph node sampling on April 12, 2024 with invasion into greater than 47/48 mm of myometrium (100%), myoinvasive lower uterine segment involvement, extensive lymphovascular invasion, and serosal involvement with the closest margin 1.5 mm anterior from paracervical/lower uterine tissue.     PLAN:   After review of the pathology and images of the radiological studies, Ms. Gilmore is noted to have aggressive endometrial cancer with p53 mutation. She has full-thickness myometrial invasion and uterine serosal involvement, extensive lymphovascular invasion, and myoinvasive lower uterine segment involvement. Therefore, she is at " high-risk for locoregional recurrence. She is recommended to undergo adjuvant external beam radiation as well as vaginal cuff brachytherapy to reduce her chance of recurrence. She has started adjuvant chemotherapy with the first cycle yesterday. I plan to deliver 4500 cGy to the pelvis followed by vaginal cuff brachytherapy in sandwich fashion after 3rd cycle of chemotherapy.      The risks, benefits, and side effects of radiation were explained in detail to the patient and her daughter who is here today, who lives in [place]. A  is also present.  All questions were answered and informed consent was signed.  I plan to see the patient back for radiation planning CT after 3 rd cycle of chemotherapy.    Psychosocial Distress screening score of Distress Score: 0 - No Distress noted and reviewed. No intervention indicated.     I spent approximately 60 minutes reviewing the available records and evaluating the patient, out of which over 50% of the time was spent face to face with the patient in counseling and coordinating this patient's care.

## 2024-06-05 NOTE — PLAN OF CARE
Taxol and Carbo infusions complete. Pt tolerated well. VSS. NAD. IV to right hand DC'd. Pt verbalized understanding of discharge instructions before leaving with daughter.

## 2024-06-06 ENCOUNTER — OFFICE VISIT (OUTPATIENT)
Dept: RADIATION ONCOLOGY | Facility: CLINIC | Age: 69
End: 2024-06-06
Payer: MEDICARE

## 2024-06-06 ENCOUNTER — TELEPHONE (OUTPATIENT)
Dept: GYNECOLOGIC ONCOLOGY | Facility: CLINIC | Age: 69
End: 2024-06-06
Payer: MEDICARE

## 2024-06-06 VITALS
HEIGHT: 67 IN | HEART RATE: 118 BPM | BODY MASS INDEX: 35.16 KG/M2 | RESPIRATION RATE: 20 BRPM | DIASTOLIC BLOOD PRESSURE: 77 MMHG | WEIGHT: 224 LBS | TEMPERATURE: 99 F | SYSTOLIC BLOOD PRESSURE: 138 MMHG | OXYGEN SATURATION: 100 %

## 2024-06-06 DIAGNOSIS — C54.1 ENDOMETRIAL ADENOCARCINOMA: ICD-10-CM

## 2024-06-06 PROCEDURE — 3078F DIAST BP <80 MM HG: CPT | Mod: CPTII,S$GLB,, | Performed by: RADIOLOGY

## 2024-06-06 PROCEDURE — 1160F RVW MEDS BY RX/DR IN RCRD: CPT | Mod: CPTII,S$GLB,, | Performed by: RADIOLOGY

## 2024-06-06 PROCEDURE — 3288F FALL RISK ASSESSMENT DOCD: CPT | Mod: CPTII,S$GLB,, | Performed by: RADIOLOGY

## 2024-06-06 PROCEDURE — 3075F SYST BP GE 130 - 139MM HG: CPT | Mod: CPTII,S$GLB,, | Performed by: RADIOLOGY

## 2024-06-06 PROCEDURE — 99204 OFFICE O/P NEW MOD 45 MIN: CPT | Mod: S$GLB,,, | Performed by: RADIOLOGY

## 2024-06-06 PROCEDURE — 1159F MED LIST DOCD IN RCRD: CPT | Mod: CPTII,S$GLB,, | Performed by: RADIOLOGY

## 2024-06-06 PROCEDURE — 99999 PR PBB SHADOW E&M-EST. PATIENT-LVL IV: CPT | Mod: PBBFAC,,, | Performed by: RADIOLOGY

## 2024-06-06 PROCEDURE — 3044F HG A1C LEVEL LT 7.0%: CPT | Mod: CPTII,S$GLB,, | Performed by: RADIOLOGY

## 2024-06-06 PROCEDURE — 1101F PT FALLS ASSESS-DOCD LE1/YR: CPT | Mod: CPTII,S$GLB,, | Performed by: RADIOLOGY

## 2024-06-06 PROCEDURE — 3008F BODY MASS INDEX DOCD: CPT | Mod: CPTII,S$GLB,, | Performed by: RADIOLOGY

## 2024-06-06 PROCEDURE — 1125F AMNT PAIN NOTED PAIN PRSNT: CPT | Mod: CPTII,S$GLB,, | Performed by: RADIOLOGY

## 2024-06-06 NOTE — TELEPHONE ENCOUNTER
----- Message from Abida Victoria sent at 6/6/2024 12:19 PM CDT -----  Regarding: call back  Type: Patient Call Back    Who called: pt     What is the request in detail: requesting call to discuss chemo side effects, states she's getting headaches and wants to be sure it is safe to take Tylenol     Can the clinic reply by [facility]?no    Would the patient rather a call back or a response via [facility]? call    Best call back number: [phone]      Additional Information:

## 2024-06-06 NOTE — TELEPHONE ENCOUNTER
"reports having a headache. Encourage to take Tylenol, drink plenty of fluids and get up an move. Reports she does get a little bit of "dizzy" states she does not have it know. BP normal per patient. States she did get Benadryl yesterday.  Advise to call back directly if there are further questions or if these symptoms fail to improve or worsen, please go to the nearest Urgent Care or ER.  Verbalized understanding.   "

## 2024-06-10 ENCOUNTER — TELEPHONE (OUTPATIENT)
Dept: GYNECOLOGIC ONCOLOGY | Facility: CLINIC | Age: 69
End: 2024-06-10
Payer: MEDICARE

## 2024-06-10 NOTE — TELEPHONE ENCOUNTER
----- Message from Abida Victoria sent at 6/10/2024  2:07 PM CDT -----  Regarding: call back  Type: Patient Call Back    Who called: pt     What is the request in detail: requesting call to discuss symptoms of her chemotherapy, states her legs hurt from the waist down     Can the clinic reply by [facility]?no    Would the patient rather a call back or a response via [facility]? call    Best call back number: [phone]     Additional Information:

## 2024-06-17 PROBLEM — A41.9 SEVERE SEPSIS: Status: RESOLVED | Noted: 2024-03-14 | Resolved: 2024-06-17

## 2024-06-17 PROBLEM — R65.20 SEVERE SEPSIS: Status: RESOLVED | Noted: 2024-03-14 | Resolved: 2024-06-17

## 2024-06-25 ENCOUNTER — LAB VISIT (OUTPATIENT)
Dept: LAB | Facility: HOSPITAL | Age: 69
End: 2024-06-25
Attending: INTERNAL MEDICINE
Payer: MEDICARE

## 2024-06-25 DIAGNOSIS — R73.9 HYPERGLYCEMIA: ICD-10-CM

## 2024-06-25 DIAGNOSIS — I10 HYPERTENSION, UNSPECIFIED TYPE: ICD-10-CM

## 2024-06-25 DIAGNOSIS — Z51.12 ENCOUNTER FOR ANTINEOPLASTIC CHEMOTHERAPY AND IMMUNOTHERAPY: ICD-10-CM

## 2024-06-25 DIAGNOSIS — Z00.00 ANNUAL PHYSICAL EXAM: ICD-10-CM

## 2024-06-25 DIAGNOSIS — C57.7 MALIGNANT NEOPLASM OF OTHER SPECIFIED FEMALE GENITAL ORGANS: ICD-10-CM

## 2024-06-25 DIAGNOSIS — Z51.11 ENCOUNTER FOR ANTINEOPLASTIC CHEMOTHERAPY AND IMMUNOTHERAPY: ICD-10-CM

## 2024-06-25 LAB
ALBUMIN SERPL BCP-MCNC: 3.6 G/DL (ref 3.5–5.2)
ALBUMIN SERPL BCP-MCNC: 3.6 G/DL (ref 3.5–5.2)
ALP SERPL-CCNC: 105 U/L (ref 55–135)
ALP SERPL-CCNC: 105 U/L (ref 55–135)
ALT SERPL W/O P-5'-P-CCNC: 15 U/L (ref 10–44)
ALT SERPL W/O P-5'-P-CCNC: 15 U/L (ref 10–44)
ANION GAP SERPL CALC-SCNC: 8 MMOL/L (ref 8–16)
ANION GAP SERPL CALC-SCNC: 8 MMOL/L (ref 8–16)
AST SERPL-CCNC: 14 U/L (ref 10–40)
AST SERPL-CCNC: 14 U/L (ref 10–40)
BASOPHILS # BLD AUTO: 0.04 K/UL (ref 0–0.2)
BASOPHILS NFR BLD: 0.6 % (ref 0–1.9)
BILIRUB SERPL-MCNC: 0.4 MG/DL (ref 0.1–1)
BILIRUB SERPL-MCNC: 0.4 MG/DL (ref 0.1–1)
BUN SERPL-MCNC: 22 MG/DL (ref 8–23)
BUN SERPL-MCNC: 22 MG/DL (ref 8–23)
CALCIUM SERPL-MCNC: 9.9 MG/DL (ref 8.7–10.5)
CALCIUM SERPL-MCNC: 9.9 MG/DL (ref 8.7–10.5)
CANCER AG125 SERPL-ACNC: 18 U/ML (ref 0–30)
CHLORIDE SERPL-SCNC: 109 MMOL/L (ref 95–110)
CHLORIDE SERPL-SCNC: 109 MMOL/L (ref 95–110)
CHOLEST SERPL-MCNC: 219 MG/DL (ref 120–199)
CHOLEST/HDLC SERPL: 4.8 {RATIO} (ref 2–5)
CO2 SERPL-SCNC: 23 MMOL/L (ref 23–29)
CO2 SERPL-SCNC: 23 MMOL/L (ref 23–29)
CREAT SERPL-MCNC: 0.9 MG/DL (ref 0.5–1.4)
CREAT SERPL-MCNC: 0.9 MG/DL (ref 0.5–1.4)
DIFFERENTIAL METHOD BLD: ABNORMAL
EOSINOPHIL # BLD AUTO: 0.1 K/UL (ref 0–0.5)
EOSINOPHIL NFR BLD: 1.8 % (ref 0–8)
ERYTHROCYTE [DISTWIDTH] IN BLOOD BY AUTOMATED COUNT: 15.1 % (ref 11.5–14.5)
ERYTHROCYTE [DISTWIDTH] IN BLOOD BY AUTOMATED COUNT: 15.1 % (ref 11.5–14.5)
EST. GFR  (NO RACE VARIABLE): >60 ML/MIN/1.73 M^2
EST. GFR  (NO RACE VARIABLE): >60 ML/MIN/1.73 M^2
ESTIMATED AVG GLUCOSE: 126 MG/DL (ref 68–131)
GLUCOSE SERPL-MCNC: 92 MG/DL (ref 70–110)
GLUCOSE SERPL-MCNC: 92 MG/DL (ref 70–110)
HBA1C MFR BLD: 6 % (ref 4–5.6)
HCT VFR BLD AUTO: 29.3 % (ref 37–48.5)
HCT VFR BLD AUTO: 29.3 % (ref 37–48.5)
HDLC SERPL-MCNC: 46 MG/DL (ref 40–75)
HDLC SERPL: 21 % (ref 20–50)
HGB BLD-MCNC: 9.1 G/DL (ref 12–16)
HGB BLD-MCNC: 9.1 G/DL (ref 12–16)
IMM GRANULOCYTES # BLD AUTO: 0.01 K/UL (ref 0–0.04)
IMM GRANULOCYTES # BLD AUTO: 0.01 K/UL (ref 0–0.04)
IMM GRANULOCYTES NFR BLD AUTO: 0.2 % (ref 0–0.5)
LDLC SERPL CALC-MCNC: 141.2 MG/DL (ref 63–159)
LYMPHOCYTES # BLD AUTO: 2 K/UL (ref 1–4.8)
LYMPHOCYTES NFR BLD: 30.2 % (ref 18–48)
MCH RBC QN AUTO: 25.4 PG (ref 27–31)
MCH RBC QN AUTO: 25.4 PG (ref 27–31)
MCHC RBC AUTO-ENTMCNC: 31.1 G/DL (ref 32–36)
MCHC RBC AUTO-ENTMCNC: 31.1 G/DL (ref 32–36)
MCV RBC AUTO: 82 FL (ref 82–98)
MCV RBC AUTO: 82 FL (ref 82–98)
MONOCYTES # BLD AUTO: 0.4 K/UL (ref 0.3–1)
MONOCYTES NFR BLD: 6.2 % (ref 4–15)
NEUTROPHILS # BLD AUTO: 4 K/UL (ref 1.8–7.7)
NEUTROPHILS # BLD AUTO: 4 K/UL (ref 1.8–7.7)
NEUTROPHILS NFR BLD: 61 % (ref 38–73)
NONHDLC SERPL-MCNC: 173 MG/DL
NRBC BLD-RTO: 0 /100 WBC
PLATELET # BLD AUTO: 191 K/UL (ref 150–450)
PLATELET # BLD AUTO: 191 K/UL (ref 150–450)
PMV BLD AUTO: 10.3 FL (ref 9.2–12.9)
PMV BLD AUTO: 10.3 FL (ref 9.2–12.9)
POTASSIUM SERPL-SCNC: 4.2 MMOL/L (ref 3.5–5.1)
POTASSIUM SERPL-SCNC: 4.2 MMOL/L (ref 3.5–5.1)
PROT SERPL-MCNC: 7.2 G/DL (ref 6–8.4)
PROT SERPL-MCNC: 7.2 G/DL (ref 6–8.4)
RBC # BLD AUTO: 3.58 M/UL (ref 4–5.4)
RBC # BLD AUTO: 3.58 M/UL (ref 4–5.4)
SODIUM SERPL-SCNC: 140 MMOL/L (ref 136–145)
SODIUM SERPL-SCNC: 140 MMOL/L (ref 136–145)
TRIGL SERPL-MCNC: 159 MG/DL (ref 30–150)
TSH SERPL DL<=0.005 MIU/L-ACNC: 1.31 UIU/ML (ref 0.4–4)
WBC # BLD AUTO: 6.59 K/UL (ref 3.9–12.7)
WBC # BLD AUTO: 6.59 K/UL (ref 3.9–12.7)

## 2024-06-25 PROCEDURE — 80053 COMPREHEN METABOLIC PANEL: CPT | Performed by: INTERNAL MEDICINE

## 2024-06-25 PROCEDURE — 85025 COMPLETE CBC W/AUTO DIFF WBC: CPT | Performed by: INTERNAL MEDICINE

## 2024-06-25 PROCEDURE — 86304 IMMUNOASSAY TUMOR CA 125: CPT | Performed by: OBSTETRICS & GYNECOLOGY

## 2024-06-25 PROCEDURE — 80061 LIPID PANEL: CPT | Performed by: INTERNAL MEDICINE

## 2024-06-25 PROCEDURE — 36415 COLL VENOUS BLD VENIPUNCTURE: CPT | Performed by: INTERNAL MEDICINE

## 2024-06-25 PROCEDURE — 83036 HEMOGLOBIN GLYCOSYLATED A1C: CPT | Performed by: INTERNAL MEDICINE

## 2024-06-25 PROCEDURE — 84443 ASSAY THYROID STIM HORMONE: CPT | Performed by: INTERNAL MEDICINE

## 2024-06-26 ENCOUNTER — TELEPHONE (OUTPATIENT)
Dept: GYNECOLOGIC ONCOLOGY | Facility: CLINIC | Age: 69
End: 2024-06-26
Payer: MEDICARE

## 2024-06-26 ENCOUNTER — OFFICE VISIT (OUTPATIENT)
Dept: GYNECOLOGIC ONCOLOGY | Facility: CLINIC | Age: 69
End: 2024-06-26
Payer: MEDICARE

## 2024-06-26 ENCOUNTER — INFUSION (OUTPATIENT)
Dept: INFUSION THERAPY | Facility: OTHER | Age: 69
End: 2024-06-26
Attending: OBSTETRICS & GYNECOLOGY
Payer: MEDICARE

## 2024-06-26 VITALS
BODY MASS INDEX: 34.69 KG/M2 | HEART RATE: 92 BPM | DIASTOLIC BLOOD PRESSURE: 89 MMHG | SYSTOLIC BLOOD PRESSURE: 188 MMHG | WEIGHT: 221 LBS | HEIGHT: 67 IN

## 2024-06-26 VITALS
SYSTOLIC BLOOD PRESSURE: 134 MMHG | TEMPERATURE: 98 F | HEART RATE: 71 BPM | DIASTOLIC BLOOD PRESSURE: 64 MMHG | RESPIRATION RATE: 16 BRPM | OXYGEN SATURATION: 99 %

## 2024-06-26 DIAGNOSIS — C57.7 MALIGNANT NEOPLASM OF OTHER SPECIFIED FEMALE GENITAL ORGANS: ICD-10-CM

## 2024-06-26 DIAGNOSIS — Z01.818 EXAMINATION PRIOR TO CHEMOTHERAPY: Primary | ICD-10-CM

## 2024-06-26 DIAGNOSIS — C54.1 ENDOMETRIAL ADENOCARCINOMA: Primary | ICD-10-CM

## 2024-06-26 PROCEDURE — 96413 CHEMO IV INFUSION 1 HR: CPT

## 2024-06-26 PROCEDURE — 96375 TX/PRO/DX INJ NEW DRUG ADDON: CPT

## 2024-06-26 PROCEDURE — 25000003 PHARM REV CODE 250: Performed by: OBSTETRICS & GYNECOLOGY

## 2024-06-26 PROCEDURE — 3077F SYST BP >= 140 MM HG: CPT | Mod: CPTII,S$GLB,, | Performed by: OBSTETRICS & GYNECOLOGY

## 2024-06-26 PROCEDURE — 96415 CHEMO IV INFUSION ADDL HR: CPT

## 2024-06-26 PROCEDURE — 3044F HG A1C LEVEL LT 7.0%: CPT | Mod: CPTII,S$GLB,, | Performed by: OBSTETRICS & GYNECOLOGY

## 2024-06-26 PROCEDURE — 3288F FALL RISK ASSESSMENT DOCD: CPT | Mod: CPTII,S$GLB,, | Performed by: OBSTETRICS & GYNECOLOGY

## 2024-06-26 PROCEDURE — 99215 OFFICE O/P EST HI 40 MIN: CPT | Mod: 24,S$GLB,, | Performed by: OBSTETRICS & GYNECOLOGY

## 2024-06-26 PROCEDURE — 3079F DIAST BP 80-89 MM HG: CPT | Mod: CPTII,S$GLB,, | Performed by: OBSTETRICS & GYNECOLOGY

## 2024-06-26 PROCEDURE — 96367 TX/PROPH/DG ADDL SEQ IV INF: CPT

## 2024-06-26 PROCEDURE — 1159F MED LIST DOCD IN RCRD: CPT | Mod: CPTII,S$GLB,, | Performed by: OBSTETRICS & GYNECOLOGY

## 2024-06-26 PROCEDURE — 63600175 PHARM REV CODE 636 W HCPCS: Performed by: OBSTETRICS & GYNECOLOGY

## 2024-06-26 PROCEDURE — 3008F BODY MASS INDEX DOCD: CPT | Mod: CPTII,S$GLB,, | Performed by: OBSTETRICS & GYNECOLOGY

## 2024-06-26 PROCEDURE — 96417 CHEMO IV INFUS EACH ADDL SEQ: CPT

## 2024-06-26 PROCEDURE — 1101F PT FALLS ASSESS-DOCD LE1/YR: CPT | Mod: CPTII,S$GLB,, | Performed by: OBSTETRICS & GYNECOLOGY

## 2024-06-26 PROCEDURE — 1125F AMNT PAIN NOTED PAIN PRSNT: CPT | Mod: CPTII,S$GLB,, | Performed by: OBSTETRICS & GYNECOLOGY

## 2024-06-26 PROCEDURE — 99999 PR PBB SHADOW E&M-EST. PATIENT-LVL III: CPT | Mod: PBBFAC,,, | Performed by: OBSTETRICS & GYNECOLOGY

## 2024-06-26 RX ORDER — HEPARIN 100 UNIT/ML
500 SYRINGE INTRAVENOUS
Status: CANCELLED | OUTPATIENT
Start: 2024-06-26

## 2024-06-26 RX ORDER — DIPHENHYDRAMINE HYDROCHLORIDE 50 MG/ML
50 INJECTION INTRAMUSCULAR; INTRAVENOUS ONCE AS NEEDED
Status: DISCONTINUED | OUTPATIENT
Start: 2024-06-26 | End: 2024-06-26 | Stop reason: HOSPADM

## 2024-06-26 RX ORDER — FAMOTIDINE 10 MG/ML
20 INJECTION INTRAVENOUS
Status: CANCELLED | OUTPATIENT
Start: 2024-06-26

## 2024-06-26 RX ORDER — HEPARIN 100 UNIT/ML
500 SYRINGE INTRAVENOUS
Status: DISCONTINUED | OUTPATIENT
Start: 2024-06-26 | End: 2024-06-26 | Stop reason: HOSPADM

## 2024-06-26 RX ORDER — DIPHENHYDRAMINE HYDROCHLORIDE 50 MG/ML
50 INJECTION INTRAMUSCULAR; INTRAVENOUS ONCE AS NEEDED
Status: CANCELLED | OUTPATIENT
Start: 2024-06-26

## 2024-06-26 RX ORDER — SODIUM CHLORIDE 0.9 % (FLUSH) 0.9 %
10 SYRINGE (ML) INJECTION
Status: DISCONTINUED | OUTPATIENT
Start: 2024-06-26 | End: 2024-06-26 | Stop reason: HOSPADM

## 2024-06-26 RX ORDER — EPINEPHRINE 0.3 MG/.3ML
0.3 INJECTION SUBCUTANEOUS ONCE AS NEEDED
Status: CANCELLED | OUTPATIENT
Start: 2024-06-26

## 2024-06-26 RX ORDER — PROCHLORPERAZINE EDISYLATE 5 MG/ML
5 INJECTION INTRAMUSCULAR; INTRAVENOUS ONCE AS NEEDED
Status: CANCELLED
Start: 2024-06-26

## 2024-06-26 RX ORDER — SODIUM CHLORIDE 0.9 % (FLUSH) 0.9 %
10 SYRINGE (ML) INJECTION
Status: CANCELLED | OUTPATIENT
Start: 2024-06-26

## 2024-06-26 RX ORDER — EPINEPHRINE 0.3 MG/.3ML
0.3 INJECTION SUBCUTANEOUS ONCE AS NEEDED
Status: DISCONTINUED | OUTPATIENT
Start: 2024-06-26 | End: 2024-06-26 | Stop reason: HOSPADM

## 2024-06-26 RX ORDER — FAMOTIDINE 10 MG/ML
20 INJECTION INTRAVENOUS
Status: COMPLETED | OUTPATIENT
Start: 2024-06-26 | End: 2024-06-26

## 2024-06-26 RX ORDER — PROCHLORPERAZINE EDISYLATE 5 MG/ML
5 INJECTION INTRAMUSCULAR; INTRAVENOUS ONCE AS NEEDED
Status: DISCONTINUED | OUTPATIENT
Start: 2024-06-26 | End: 2024-06-26 | Stop reason: HOSPADM

## 2024-06-26 RX ADMIN — FAMOTIDINE 20 MG: 10 INJECTION INTRAVENOUS at 10:06

## 2024-06-26 RX ADMIN — CARBOPLATIN 585 MG: 10 INJECTION, SOLUTION INTRAVENOUS at 03:06

## 2024-06-26 RX ADMIN — SODIUM CHLORIDE 500 ML: 9 INJECTION, SOLUTION INTRAVENOUS at 03:06

## 2024-06-26 RX ADMIN — DIPHENHYDRAMINE HYDROCHLORIDE 50 MG: 50 INJECTION, SOLUTION INTRAMUSCULAR; INTRAVENOUS at 11:06

## 2024-06-26 RX ADMIN — FOSAPREPITANT 150 MG: 150 INJECTION, POWDER, LYOPHILIZED, FOR SOLUTION INTRAVENOUS at 10:06

## 2024-06-26 RX ADMIN — PACLITAXEL 378 MG: 6 INJECTION, SOLUTION INTRAVENOUS at 11:06

## 2024-06-26 RX ADMIN — DEXAMETHASONE SODIUM PHOSPHATE 0.25 MG: 4 INJECTION, SOLUTION INTRA-ARTICULAR; INTRALESIONAL; INTRAMUSCULAR; INTRAVENOUS; SOFT TISSUE at 10:06

## 2024-06-26 NOTE — PLAN OF CARE
Vital Signs Stable, No apparent distress noted; Pt tolerated C2 Taxol/Carbo  w/o difficulty.  24g piv removed;No bleeding,swelling or drainage noted to the site.  AVS/Discharge instructions reviewed;all questions answered;Pt verbalizes understanding. Next appointments scheduled and printed; pt ambulated from clinic in [facility]  RTC 7/17/24

## 2024-06-26 NOTE — PROGRESS NOTES
"Subjective     Patient ID: Cherri Gilmore is a 69 y.o. female.    Chief Complaint: Chemotherapy (chemo)    HPI  S/p RTLH/BSO/SLN 2024  Stage IIIA2, grade 3 mixed carcinoma, tumor size 11.5cm, 100% myometrial invasion, extensive LVSI, negative lymph nodes.   P53 mutated  MMRp  HER2 0+  ER/AL-  NGS KRAS     IR port placed .     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    Carbo/Taxol  C1- 2024  C2-     Today's visit:  Presents today for consideration of C2.  Tolerating with expected side effects and no dose limiting toxicities.   Labs reviewed and appropriate for treatment.   18    She self discontinued her BP medications. I have asked her to resume.     ___________________________  Patient reported PMB and had EMBx on 3/7/2024 at OSH. Biopsy was consistent with high grade endometrial adenocarcinoma, FIGO grade 3. No features suggestive of serous morphology, although mixed mullerian tumor could not be excluded. Tumor positive for "vimentin, pankeratin, WT1 (predominantly cytoplasmic positivity), PAX8, and p16. There is patchy strong positivity for p63. Estrogen receptor positivity is 1-2%. Although there is strong positivity for p53, the pattern is mixed and appears to be non mutated. The Ki-67 proliferation index is markedly increased (focally up to 80%). There is negative staining for progesterone receptor, CEA-M, CD10, and Napsin A."      CT CAP 3/14/2024 with enlarged uterus, however no obvious evidence of metastatic disease.      Prior abdominal surgeries include BTL and cholecystectomy.  x 5.      Recently admitted for sepsis 3/14 of unclear source. Recovered with antibiotics therapy and resuscitation.          Review of Systems   Constitutional:  Negative for appetite change, chills, fatigue and fever.   HENT:  Negative for mouth sores.    Respiratory:  " Negative for cough and shortness of breath.    Cardiovascular:  Negative for leg swelling.   Gastrointestinal:  Negative for abdominal pain, blood in stool, constipation and diarrhea.   Endocrine: Negative for cold intolerance.   Genitourinary:  Negative for dysuria and vaginal bleeding.   Musculoskeletal:  Negative for myalgias.   Integumentary:  Negative for rash.   Allergic/Immunologic: Negative.    Neurological:  Negative for weakness and numbness.   Hematological:  Negative for adenopathy. Does not bruise/bleed easily.   Psychiatric/Behavioral:  Negative for confusion.           Objective     Physical Exam  Vitals reviewed. Exam conducted with a chaperone present.   Constitutional:       Appearance: Normal appearance.   HENT:      Head: Normocephalic and atraumatic.   Eyes:      Extraocular Movements: Extraocular movements intact.      Conjunctiva/sclera: Conjunctivae normal.      Pupils: Pupils are equal, round, and reactive to light.   Pulmonary:      Effort: Pulmonary effort is normal. No respiratory distress.   Abdominal:      General: There is no distension.      Palpations: Abdomen is soft.      Tenderness: There is no abdominal tenderness.   Musculoskeletal:         General: Normal range of motion.   Skin:     General: Skin is warm and dry.   Neurological:      General: No focal deficit present.      Mental Status: She is alert and oriented to person, place, and time. Mental status is at baseline.   Psychiatric:         Mood and Affect: Mood normal.         Behavior: Behavior normal.         Thought Content: Thought content normal.         Judgment: Judgment normal.          Assessment and Plan     1. Examination prior to chemotherapy    2. Malignant neoplasm of other specified female genital organs    Other orders  -     fosaprepitant 150 mg in 0.9% NaCl 150 mL IVPB  -     palonosetron 0.25mg/dexAMETHasone 20mg in NS IVPB 0.25 mg 50 mL  -     diphenhydrAMINE (BENADRYL) 50 mg in NS 50 mL IVPB  -      famotidine (PF) injection 20 mg  -     PACLitaxeL (TAXOL) 175 mg/m2 = 378 mg in 0.9% NaCl 500 mL chemo infusion  -     CARBOplatin (PARAPLATIN) 585 mg in 0.9% NaCl 308.5 mL chemo infusion  -     sodium chloride 0.9% bolus 500 mL 500 mL  -     prochlorperazine injection Soln 5 mg  -     EPINEPHrine (EPIPEN) 0.3 mg/0.3 mL pen injection 0.3 mg  -     diphenhydrAMINE injection 50 mg  -     hydrocortisone sodium succinate injection 100 mg  -     sodium chloride 0.9% flush 10 mL  -     heparin, porcine (PF) 100 unit/mL injection flush 500 Units  -     alteplase injection 2 mg      Cleared to proceed with C2 as above.   RTC prechemo or sooner if needed.     I spent approximately 45 minutes reviewing the available records and evaluating the patient, out of which over 50% of the time was spent face to face with the patient in counseling and coordinating this patient's care.                   No follow-ups on file.

## 2024-06-28 ENCOUNTER — TELEPHONE (OUTPATIENT)
Dept: RADIATION ONCOLOGY | Facility: CLINIC | Age: 69
End: 2024-06-28
Payer: MEDICARE

## 2024-06-28 NOTE — TELEPHONE ENCOUNTER
----- Message from Morena Briscoe MD sent at 6/28/2024  7:06 AM CDT -----  Ok thank you. She is receiving chemotherapy with us, so that's interesting.   I will refer her.  ----- Message -----  From: Ignacio Chinchilla RN  Sent: 6/27/2024   9:56 AM CDT  To: Morena Briscoe MD; Gaby Turner MD    This pt has an insurance that I am just learning today is not accepted by [facility]. She has [facility] Dual Complete which is out of network. [facility] facilities are a covered provider/facility. She will not be able to have radiation here at [facility].    Ignacio león/Dr Turner  ----- Message -----  From: Gaby Turner MD  Sent: 6/6/2024  11:05 AM CDT  To: Ignacio Chinchilla RN; Dyan Chaney RN    3 cm cylinder  Getting 3 cycles of chemo, then sim for pelvis, then HDR

## 2024-07-01 DIAGNOSIS — C54.1 ENDOMETRIAL ADENOCARCINOMA: ICD-10-CM

## 2024-07-01 DIAGNOSIS — C57.7 MALIGNANT NEOPLASM OF OTHER SPECIFIED FEMALE GENITAL ORGANS: Primary | ICD-10-CM

## 2024-07-01 RX ORDER — OXYCODONE HYDROCHLORIDE 5 MG/1
5 TABLET ORAL EVERY 4 HOURS PRN
Qty: 10 TABLET | Refills: 0 | Status: CANCELLED | OUTPATIENT
Start: 2024-07-01

## 2024-07-01 NOTE — TELEPHONE ENCOUNTER
----- Message from Delmis Chavez sent at 7/1/2024 12:32 PM CDT -----  Regarding: Refill Request    Who Called: Patient and  [id number] #        New Prescription or Refill : Refill    RX Name and Strength:   oxyCODONE (ROXICODONE) 5 MG immediate release tablet      RX Name and Strength:         RX Name and Strength:      30 day or 90 day RX:     Preferred [facility] [place] - [address]    Would the patient rather a call back or a response via [facility]?    Best Call Back Number:  [phone]    Additional Information:

## 2024-07-03 ENCOUNTER — OFFICE VISIT (OUTPATIENT)
Dept: INTERNAL MEDICINE | Facility: CLINIC | Age: 69
End: 2024-07-03
Payer: MEDICARE

## 2024-07-03 ENCOUNTER — PATIENT MESSAGE (OUTPATIENT)
Dept: GYNECOLOGIC ONCOLOGY | Facility: CLINIC | Age: 69
End: 2024-07-03
Payer: MEDICARE

## 2024-07-03 VITALS
WEIGHT: 216.25 LBS | OXYGEN SATURATION: 98 % | SYSTOLIC BLOOD PRESSURE: 132 MMHG | HEART RATE: 72 BPM | BODY MASS INDEX: 33.94 KG/M2 | DIASTOLIC BLOOD PRESSURE: 70 MMHG | HEIGHT: 67 IN

## 2024-07-03 DIAGNOSIS — G62.9 NEUROPATHY: ICD-10-CM

## 2024-07-03 DIAGNOSIS — Z78.0 POSTMENOPAUSAL: ICD-10-CM

## 2024-07-03 DIAGNOSIS — R73.9 HYPERGLYCEMIA: ICD-10-CM

## 2024-07-03 DIAGNOSIS — C54.1 ENDOMETRIAL ADENOCARCINOMA: ICD-10-CM

## 2024-07-03 DIAGNOSIS — I10 PRIMARY HYPERTENSION: Primary | ICD-10-CM

## 2024-07-03 DIAGNOSIS — Z90.710 HISTORY OF ROBOT-ASSISTED LAPAROSCOPIC HYSTERECTOMY: ICD-10-CM

## 2024-07-03 PROCEDURE — 1101F PT FALLS ASSESS-DOCD LE1/YR: CPT | Mod: CPTII,S$GLB,, | Performed by: INTERNAL MEDICINE

## 2024-07-03 PROCEDURE — 1159F MED LIST DOCD IN RCRD: CPT | Mod: CPTII,S$GLB,, | Performed by: INTERNAL MEDICINE

## 2024-07-03 PROCEDURE — 3075F SYST BP GE 130 - 139MM HG: CPT | Mod: CPTII,S$GLB,, | Performed by: INTERNAL MEDICINE

## 2024-07-03 PROCEDURE — 99999 PR PBB SHADOW E&M-EST. PATIENT-LVL III: CPT | Mod: PBBFAC,,, | Performed by: INTERNAL MEDICINE

## 2024-07-03 PROCEDURE — 99214 OFFICE O/P EST MOD 30 MIN: CPT | Mod: S$GLB,,, | Performed by: INTERNAL MEDICINE

## 2024-07-03 PROCEDURE — 3288F FALL RISK ASSESSMENT DOCD: CPT | Mod: CPTII,S$GLB,, | Performed by: INTERNAL MEDICINE

## 2024-07-03 PROCEDURE — 3008F BODY MASS INDEX DOCD: CPT | Mod: CPTII,S$GLB,, | Performed by: INTERNAL MEDICINE

## 2024-07-03 PROCEDURE — 3044F HG A1C LEVEL LT 7.0%: CPT | Mod: CPTII,S$GLB,, | Performed by: INTERNAL MEDICINE

## 2024-07-03 PROCEDURE — 3078F DIAST BP <80 MM HG: CPT | Mod: CPTII,S$GLB,, | Performed by: INTERNAL MEDICINE

## 2024-07-03 RX ORDER — KETOCONAZOLE 20 MG/ML
SHAMPOO, SUSPENSION TOPICAL WEEKLY
Qty: 120 ML | Refills: 2 | Status: SHIPPED | OUTPATIENT
Start: 2024-07-03

## 2024-07-03 RX ORDER — GABAPENTIN 100 MG/1
100 CAPSULE ORAL NIGHTLY PRN
Qty: 90 CAPSULE | Refills: 1 | Status: SHIPPED | OUTPATIENT
Start: 2024-07-03 | End: 2025-07-03

## 2024-07-03 NOTE — PROGRESS NOTES
"Subjective:       Patient ID: Cherri Gilmore is a 69 y.o. female.    Chief Complaint: Follow-up  This is a 69-year-old who presents today for follow-up . She declines interpretor today. she has been undergoing chemo treatment for her endometrial cancer and following with OB gyn reports that she is tolerating her treatments no nausea vomiting she has had some fatigue and mostly difficulty sleeping at night and some neuropathy in her legs burning sensation that has been more painful.  She reports her spirits are good she has a grandson that keeps her active and thinks that is helping with her mood she reports she does have family support she has been losing her hair and does have some itching in her scalp dryness as well was wondering if there is anything she could use she did have her mammogram and reports that they postponed her colonoscopy until she is completed with her treatments since she had her surgery more recently she continues follow with Dr. Harman and is undergoing her rounds of chemo currently    HPI  Review of Systems   HENT:          Hair loss itching scalp    Respiratory:  Negative for shortness of breath.    Cardiovascular:  Negative for chest pain.   Gastrointestinal:  Negative for nausea.   Genitourinary:         Occasional itching after gm no bleeding  Taking stool softener    Musculoskeletal:         Leg pain /numbness with chemo     Psychiatric/Behavioral:  Positive for sleep disturbance.        Objective:    Blood pressure 132/70, pulse 72, height 5' 7" (1.702 m), weight 98.1 kg (216 lb 4.3 oz), SpO2 98%.   Physical Exam  Constitutional:       General: She is not in acute distress.  HENT:      Head: Normocephalic.      Comments: Hair thinning  Seb derm salp dry      Mouth/Throat:      Pharynx: Oropharynx is clear.   Cardiovascular:      Rate and Rhythm: Normal rate and regular rhythm.      Heart sounds: Normal heart sounds. No murmur heard.     No friction rub. No gallop.   Pulmonary: "      Effort: Pulmonary effort is normal. No respiratory distress.      Breath sounds: Normal breath sounds.   Abdominal:      General: Bowel sounds are normal.      Palpations: Abdomen is soft. There is no mass.      Tenderness: There is no abdominal tenderness.   Musculoskeletal:      Comments: No swelling     Skin:     Findings: No erythema.   Neurological:      Mental Status: She is alert.   Psychiatric:         Mood and Affect: Mood normal.         Assessment:       1. Primary hypertension    2. Postmenopausal    3. Endometrial adenocarcinoma    4. H/o RA-TLH/BSO/SLND    5. Hyperglycemia    6. Neuropathy        Plan:       Cherri was seen today for follow-up.    Diagnoses and all orders for this visit:    Primary hypertension  She remains on amlodipine losartan HCTZ will continue current regimen blood pressure acceptable    Postmenopausal  -     DXA Bone Density Axial Skeleton 1 or more sites; Future    Endometrial adenocarcinoma  H/o RA-TLH/BSO/SLND  She is undergoing chemo treatment and following with Hematology Oncology    Hyperglycemia  Discussed slight trend up she is getting some steroids with her treatments discussed dietary measures    For itching rectal keep area clean, stool softener consider tucks or otc hemorhiodal cream if needed     Neuropathy  Treatment related with burning at night and difficulty sleeping discussed trial of gabapentin order placed in message sent to Gyne Onc    Hair loss likely related to her treatments but some dermatitis trial of  -     ketoconazole (NIZORAL) 2 % shampoo; Apply topically once a week.    -     gabapentin (NEURONTIN) 100 MG capsule; Take 1 capsule (100 mg total) by mouth nightly as needed (nerve pain/ chemo).    Cardiology referrals in for her to schedule and she had her mammogram will be due for a colonoscopy when she is done with treatments in the future    Labs reviewed with pt    Follow-up 3-4 months sooner if concern continue gynecology oncology  treatments

## 2024-07-16 ENCOUNTER — LAB VISIT (OUTPATIENT)
Dept: LAB | Facility: HOSPITAL | Age: 69
End: 2024-07-16
Attending: INTERNAL MEDICINE
Payer: MEDICARE

## 2024-07-16 DIAGNOSIS — Z51.11 ENCOUNTER FOR ANTINEOPLASTIC CHEMOTHERAPY AND IMMUNOTHERAPY: ICD-10-CM

## 2024-07-16 DIAGNOSIS — C57.7 MALIGNANT NEOPLASM OF OTHER SPECIFIED FEMALE GENITAL ORGANS: ICD-10-CM

## 2024-07-16 DIAGNOSIS — Z51.12 ENCOUNTER FOR ANTINEOPLASTIC CHEMOTHERAPY AND IMMUNOTHERAPY: ICD-10-CM

## 2024-07-16 LAB
ALBUMIN SERPL BCP-MCNC: 3.7 G/DL (ref 3.5–5.2)
ALP SERPL-CCNC: 98 U/L (ref 55–135)
ALT SERPL W/O P-5'-P-CCNC: 15 U/L (ref 10–44)
ANION GAP SERPL CALC-SCNC: 8 MMOL/L (ref 8–16)
AST SERPL-CCNC: 15 U/L (ref 10–40)
BILIRUB SERPL-MCNC: 0.3 MG/DL (ref 0.1–1)
BUN SERPL-MCNC: 20 MG/DL (ref 8–23)
CALCIUM SERPL-MCNC: 10 MG/DL (ref 8.7–10.5)
CANCER AG125 SERPL-ACNC: 21 U/ML (ref 0–30)
CHLORIDE SERPL-SCNC: 108 MMOL/L (ref 95–110)
CO2 SERPL-SCNC: 24 MMOL/L (ref 23–29)
CREAT SERPL-MCNC: 1.1 MG/DL (ref 0.5–1.4)
ERYTHROCYTE [DISTWIDTH] IN BLOOD BY AUTOMATED COUNT: 16.6 % (ref 11.5–14.5)
EST. GFR  (NO RACE VARIABLE): 54.4 ML/MIN/1.73 M^2
GLUCOSE SERPL-MCNC: 114 MG/DL (ref 70–110)
HCT VFR BLD AUTO: 30.7 % (ref 37–48.5)
HGB BLD-MCNC: 9.8 G/DL (ref 12–16)
IMM GRANULOCYTES # BLD AUTO: 0.03 K/UL (ref 0–0.04)
MCH RBC QN AUTO: 26.5 PG (ref 27–31)
MCHC RBC AUTO-ENTMCNC: 31.9 G/DL (ref 32–36)
MCV RBC AUTO: 83 FL (ref 82–98)
NEUTROPHILS # BLD AUTO: 5.7 K/UL (ref 1.8–7.7)
PLATELET # BLD AUTO: 141 K/UL (ref 150–450)
PMV BLD AUTO: 10 FL (ref 9.2–12.9)
POTASSIUM SERPL-SCNC: 4.6 MMOL/L (ref 3.5–5.1)
PROT SERPL-MCNC: 7.4 G/DL (ref 6–8.4)
RBC # BLD AUTO: 3.7 M/UL (ref 4–5.4)
SODIUM SERPL-SCNC: 140 MMOL/L (ref 136–145)
WBC # BLD AUTO: 8.02 K/UL (ref 3.9–12.7)

## 2024-07-16 PROCEDURE — 86304 IMMUNOASSAY TUMOR CA 125: CPT | Performed by: OBSTETRICS & GYNECOLOGY

## 2024-07-16 PROCEDURE — 36415 COLL VENOUS BLD VENIPUNCTURE: CPT | Performed by: OBSTETRICS & GYNECOLOGY

## 2024-07-16 PROCEDURE — 80053 COMPREHEN METABOLIC PANEL: CPT | Performed by: OBSTETRICS & GYNECOLOGY

## 2024-07-16 PROCEDURE — 85027 COMPLETE CBC AUTOMATED: CPT | Performed by: OBSTETRICS & GYNECOLOGY

## 2024-07-17 ENCOUNTER — OFFICE VISIT (OUTPATIENT)
Dept: GYNECOLOGIC ONCOLOGY | Facility: CLINIC | Age: 69
End: 2024-07-17
Payer: MEDICARE

## 2024-07-17 ENCOUNTER — INFUSION (OUTPATIENT)
Dept: INFUSION THERAPY | Facility: OTHER | Age: 69
End: 2024-07-17
Attending: OBSTETRICS & GYNECOLOGY
Payer: MEDICARE

## 2024-07-17 VITALS
HEART RATE: 82 BPM | TEMPERATURE: 98 F | RESPIRATION RATE: 16 BRPM | OXYGEN SATURATION: 99 % | SYSTOLIC BLOOD PRESSURE: 120 MMHG | DIASTOLIC BLOOD PRESSURE: 61 MMHG

## 2024-07-17 VITALS
DIASTOLIC BLOOD PRESSURE: 69 MMHG | HEIGHT: 67 IN | BODY MASS INDEX: 34.69 KG/M2 | SYSTOLIC BLOOD PRESSURE: 141 MMHG | HEART RATE: 92 BPM | WEIGHT: 221 LBS

## 2024-07-17 DIAGNOSIS — C57.7 MALIGNANT NEOPLASM OF OTHER SPECIFIED FEMALE GENITAL ORGANS: Primary | ICD-10-CM

## 2024-07-17 DIAGNOSIS — C54.1 ENDOMETRIAL ADENOCARCINOMA: ICD-10-CM

## 2024-07-17 DIAGNOSIS — C54.1 ENDOMETRIAL ADENOCARCINOMA: Primary | ICD-10-CM

## 2024-07-17 PROCEDURE — 96413 CHEMO IV INFUSION 1 HR: CPT

## 2024-07-17 PROCEDURE — 3044F HG A1C LEVEL LT 7.0%: CPT | Mod: CPTII,S$GLB,, | Performed by: OBSTETRICS & GYNECOLOGY

## 2024-07-17 PROCEDURE — 1159F MED LIST DOCD IN RCRD: CPT | Mod: CPTII,S$GLB,, | Performed by: OBSTETRICS & GYNECOLOGY

## 2024-07-17 PROCEDURE — 96375 TX/PRO/DX INJ NEW DRUG ADDON: CPT

## 2024-07-17 PROCEDURE — 96417 CHEMO IV INFUS EACH ADDL SEQ: CPT

## 2024-07-17 PROCEDURE — 3078F DIAST BP <80 MM HG: CPT | Mod: CPTII,S$GLB,, | Performed by: OBSTETRICS & GYNECOLOGY

## 2024-07-17 PROCEDURE — 25000003 PHARM REV CODE 250: Performed by: OBSTETRICS & GYNECOLOGY

## 2024-07-17 PROCEDURE — 1101F PT FALLS ASSESS-DOCD LE1/YR: CPT | Mod: CPTII,S$GLB,, | Performed by: OBSTETRICS & GYNECOLOGY

## 2024-07-17 PROCEDURE — 63600175 PHARM REV CODE 636 W HCPCS: Performed by: OBSTETRICS & GYNECOLOGY

## 2024-07-17 PROCEDURE — 99999 PR PBB SHADOW E&M-EST. PATIENT-LVL III: CPT | Mod: PBBFAC,,, | Performed by: OBSTETRICS & GYNECOLOGY

## 2024-07-17 PROCEDURE — 1125F AMNT PAIN NOTED PAIN PRSNT: CPT | Mod: CPTII,S$GLB,, | Performed by: OBSTETRICS & GYNECOLOGY

## 2024-07-17 PROCEDURE — 3008F BODY MASS INDEX DOCD: CPT | Mod: CPTII,S$GLB,, | Performed by: OBSTETRICS & GYNECOLOGY

## 2024-07-17 PROCEDURE — 96367 TX/PROPH/DG ADDL SEQ IV INF: CPT

## 2024-07-17 PROCEDURE — 99215 OFFICE O/P EST HI 40 MIN: CPT | Mod: S$GLB,,, | Performed by: OBSTETRICS & GYNECOLOGY

## 2024-07-17 PROCEDURE — 3077F SYST BP >= 140 MM HG: CPT | Mod: CPTII,S$GLB,, | Performed by: OBSTETRICS & GYNECOLOGY

## 2024-07-17 PROCEDURE — 3288F FALL RISK ASSESSMENT DOCD: CPT | Mod: CPTII,S$GLB,, | Performed by: OBSTETRICS & GYNECOLOGY

## 2024-07-17 PROCEDURE — 96415 CHEMO IV INFUSION ADDL HR: CPT

## 2024-07-17 RX ORDER — PROCHLORPERAZINE EDISYLATE 5 MG/ML
5 INJECTION INTRAMUSCULAR; INTRAVENOUS ONCE AS NEEDED
Status: DISCONTINUED | OUTPATIENT
Start: 2024-07-17 | End: 2024-07-17 | Stop reason: HOSPADM

## 2024-07-17 RX ORDER — FAMOTIDINE 10 MG/ML
20 INJECTION INTRAVENOUS
Status: COMPLETED | OUTPATIENT
Start: 2024-07-17 | End: 2024-07-17

## 2024-07-17 RX ORDER — DIPHENHYDRAMINE HYDROCHLORIDE 50 MG/ML
50 INJECTION INTRAMUSCULAR; INTRAVENOUS ONCE AS NEEDED
Status: CANCELLED | OUTPATIENT
Start: 2024-07-17

## 2024-07-17 RX ORDER — EPINEPHRINE 0.3 MG/.3ML
0.3 INJECTION SUBCUTANEOUS ONCE AS NEEDED
Status: DISCONTINUED | OUTPATIENT
Start: 2024-07-17 | End: 2024-07-17 | Stop reason: HOSPADM

## 2024-07-17 RX ORDER — SODIUM CHLORIDE 0.9 % (FLUSH) 0.9 %
10 SYRINGE (ML) INJECTION
Status: DISCONTINUED | OUTPATIENT
Start: 2024-07-17 | End: 2024-07-17 | Stop reason: HOSPADM

## 2024-07-17 RX ORDER — EPINEPHRINE 0.3 MG/.3ML
0.3 INJECTION SUBCUTANEOUS ONCE AS NEEDED
Status: CANCELLED | OUTPATIENT
Start: 2024-07-17

## 2024-07-17 RX ORDER — PROCHLORPERAZINE EDISYLATE 5 MG/ML
5 INJECTION INTRAMUSCULAR; INTRAVENOUS ONCE AS NEEDED
Status: CANCELLED
Start: 2024-07-17

## 2024-07-17 RX ORDER — SODIUM CHLORIDE 0.9 % (FLUSH) 0.9 %
10 SYRINGE (ML) INJECTION
Status: CANCELLED | OUTPATIENT
Start: 2024-07-17

## 2024-07-17 RX ORDER — DIPHENHYDRAMINE HYDROCHLORIDE 50 MG/ML
50 INJECTION INTRAMUSCULAR; INTRAVENOUS ONCE AS NEEDED
Status: DISCONTINUED | OUTPATIENT
Start: 2024-07-17 | End: 2024-07-17 | Stop reason: HOSPADM

## 2024-07-17 RX ORDER — HEPARIN 100 UNIT/ML
500 SYRINGE INTRAVENOUS
Status: DISCONTINUED | OUTPATIENT
Start: 2024-07-17 | End: 2024-07-17 | Stop reason: HOSPADM

## 2024-07-17 RX ORDER — HEPARIN 100 UNIT/ML
500 SYRINGE INTRAVENOUS
Status: CANCELLED | OUTPATIENT
Start: 2024-07-17

## 2024-07-17 RX ORDER — FAMOTIDINE 10 MG/ML
20 INJECTION INTRAVENOUS
Status: CANCELLED | OUTPATIENT
Start: 2024-07-17

## 2024-07-17 RX ADMIN — SODIUM CHLORIDE 500 ML: 9 INJECTION, SOLUTION INTRAVENOUS at 02:07

## 2024-07-17 RX ADMIN — DIPHENHYDRAMINE HYDROCHLORIDE 50 MG: 50 INJECTION, SOLUTION INTRAMUSCULAR; INTRAVENOUS at 10:07

## 2024-07-17 RX ADMIN — DEXAMETHASONE SODIUM PHOSPHATE 0.25 MG: 4 INJECTION, SOLUTION INTRA-ARTICULAR; INTRALESIONAL; INTRAMUSCULAR; INTRAVENOUS; SOFT TISSUE at 10:07

## 2024-07-17 RX ADMIN — FOSAPREPITANT 150 MG: 150 INJECTION, POWDER, LYOPHILIZED, FOR SOLUTION INTRAVENOUS at 09:07

## 2024-07-17 RX ADMIN — FAMOTIDINE 20 MG: 10 INJECTION INTRAVENOUS at 09:07

## 2024-07-17 RX ADMIN — CARBOPLATIN 500 MG: 10 INJECTION, SOLUTION INTRAVENOUS at 01:07

## 2024-07-17 RX ADMIN — PACLITAXEL 378 MG: 6 INJECTION, SOLUTION INTRAVENOUS at 10:07

## 2024-07-17 NOTE — PLAN OF CARE
Vital Signs Stable, No apparent distress noted; Pt tolerated __Taxol/Carbo_ infusion w/o difficulty.  24g piv removed;No bleeding,swelling or drainage noted to the site.  AVS/Discharge instructions noted;all questions answered ;Pt verbalizes understanding. Next appointments scheduled and printed for patient; ambulated from clinic in [facility] 8/7/24

## 2024-07-22 DIAGNOSIS — Z51.11 ENCOUNTER FOR CHEMOTHERAPY MANAGEMENT: ICD-10-CM

## 2024-07-22 DIAGNOSIS — C54.1 ENDOMETRIAL ADENOCARCINOMA: ICD-10-CM

## 2024-07-22 RX ORDER — OLANZAPINE 5 MG/1
TABLET ORAL
Qty: 30 TABLET | Refills: 10 | Status: SHIPPED | OUTPATIENT
Start: 2024-07-22

## 2024-07-26 ENCOUNTER — TELEPHONE (OUTPATIENT)
Dept: GYNECOLOGIC ONCOLOGY | Facility: CLINIC | Age: 69
End: 2024-07-26
Payer: MEDICARE

## 2024-07-26 NOTE — TELEPHONE ENCOUNTER
Called to f/u with patient. C/o of feeling like she is catching a cold. Denies cough, fever/chills or sob. Advised pt to try an otc cold med and to report to ED if she develops any fever,chills or sob. VU. Also c/o pain in legs/feet. Not currently taking prescribed gabapentin. Advised pt to start taking gabapentin. Verbalized understanding. She is also taking tylenol&ibuprofen and reports some relief with that.      ----- Message from Alexa Funes sent at 7/26/2024  3:34 PM CDT -----  Message Type: Return Call              Who Called: JUAN DIEGO COUCH [[id number]]                 Who Left Message for Patient: Blanca Duffy RN                 Does the patient know what this is regarding? Yes                  Best Call Back Number:  [phone]                 Additional Information: Patient is returning a call.  Please assist.

## 2024-07-26 NOTE — TELEPHONE ENCOUNTER
Called to f/u with patient. NA. Left Vm with call back information  ----- Message from Jeffery Morin MA sent at 7/26/2024 10:59 AM CDT -----  Contact: [phone]  Hi, please give our patient a call back she states that she feel like she is about to catch a cold and she would like to know what she can take for pain. Also she can't sleep at night. Thanks Jeffery

## 2024-07-29 ENCOUNTER — TELEPHONE (OUTPATIENT)
Dept: INTERNAL MEDICINE | Facility: CLINIC | Age: 69
End: 2024-07-29
Payer: MEDICARE

## 2024-07-29 NOTE — TELEPHONE ENCOUNTER
----- Message from Mónica Guerrero sent at 7/29/2024  8:53 AM CDT -----  Regarding: Bone Scan Scheduled for 07/31  Good morning,    I am reaching out regarding the bone scan scheduled for this patient at [facility] on 07/31/24. This patient currently has [facility] Dual Complete HMO. This plan is out of network with the selected facility and does not have out of network benefits.    This plan is ONLY in-network with [facility] ([facility]), [facility]), [facility]), [facility] ([facility]), [facility]), [facility]), [facility]), [facility]), [facility]), [facility]), [facility]), [facility]), [facility]).    I have attempted to reach out to notify the patient by phone, but there was no answer. I left a voicemail for the patient to call back and also sent her a Tunespeak message.    Thank you,  Dolly

## 2024-07-29 NOTE — PROGRESS NOTES
"Subjective     Patient ID: Cherri Gilmore is a 69 y.o. female.    Chief Complaint: Chemotherapy (chemo)    HPI  S/p RTLH/BSO/SLN 2024  Stage IIIA2, grade 3 mixed carcinoma, tumor size 11.5cm, 100% myometrial invasion, extensive LVSI, negative lymph nodes.   P53 mutated  MMRp  HER2 0+  ER/CO-  NGS KRAS     IR port placed .     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    Carbo/Taxol  C1- 2024  C2-   C3-     Today's visit:  Presents today for consideration of C3.  Tolerating with expected side effects and no dose limiting toxicities.   Labs reviewed and appropriate for treatment.   21    ___________________________  Patient reported PMB and had EMBx on 3/7/2024 at OSH. Biopsy was consistent with high grade endometrial adenocarcinoma, FIGO grade 3. No features suggestive of serous morphology, although mixed mullerian tumor could not be excluded. Tumor positive for "vimentin, pankeratin, WT1 (predominantly cytoplasmic positivity), PAX8, and p16. There is patchy strong positivity for p63. Estrogen receptor positivity is 1-2%. Although there is strong positivity for p53, the pattern is mixed and appears to be non mutated. The Ki-67 proliferation index is markedly increased (focally up to 80%). There is negative staining for progesterone receptor, CEA-M, CD10, and Napsin A."      CT CAP 3/14/2024 with enlarged uterus, however no obvious evidence of metastatic disease.      Prior abdominal surgeries include BTL and cholecystectomy.  x 5.      Recently admitted for sepsis 3/14 of unclear source. Recovered with antibiotics therapy and resuscitation.          Review of Systems   Constitutional:  Negative for appetite change, chills, fatigue and fever.   HENT:  Negative for mouth sores.    Respiratory:  Negative for cough and shortness of breath.    Cardiovascular: "  Negative for leg swelling.   Gastrointestinal:  Negative for abdominal pain, blood in stool, constipation and diarrhea.   Endocrine: Negative for cold intolerance.   Genitourinary:  Negative for dysuria and vaginal bleeding.   Musculoskeletal:  Negative for myalgias.   Integumentary:  Negative for rash.   Allergic/Immunologic: Negative.    Neurological:  Negative for weakness and numbness.   Hematological:  Negative for adenopathy. Does not bruise/bleed easily.   Psychiatric/Behavioral:  Negative for confusion.           Objective     Physical Exam  Vitals reviewed. Exam conducted with a chaperone present.   Constitutional:       Appearance: Normal appearance.   HENT:      Head: Normocephalic and atraumatic.   Eyes:      Extraocular Movements: Extraocular movements intact.      Conjunctiva/sclera: Conjunctivae normal.      Pupils: Pupils are equal, round, and reactive to light.   Pulmonary:      Effort: Pulmonary effort is normal. No respiratory distress.   Abdominal:      General: There is no distension.      Palpations: Abdomen is soft.      Tenderness: There is no abdominal tenderness.   Musculoskeletal:         General: Normal range of motion.   Skin:     General: Skin is warm and dry.   Neurological:      General: No focal deficit present.      Mental Status: She is alert and oriented to person, place, and time. Mental status is at baseline.   Psychiatric:         Mood and Affect: Mood normal.         Behavior: Behavior normal.         Thought Content: Thought content normal.         Judgment: Judgment normal.          Assessment and Plan     1. Malignant neoplasm of other specified female genital organs  -     CT Chest Abdomen Pelvis With IV Contrast (XPD) Routine Oral Contrast; Future; Expected date: 07/17/2024    2. Endometrial adenocarcinoma  -     CT Chest Abdomen Pelvis With IV Contrast (XPD) Routine Oral Contrast; Future; Expected date: 07/17/2024      Cleared to proceed with C3 as above.   RTC prechemo  or sooner if needed.     I spent approximately 45 minutes reviewing the available records and evaluating the patient, out of which over 50% of the time was spent face to face with the patient in counseling and coordinating this patient's care.                   No follow-ups on file.

## 2024-07-29 NOTE — TELEPHONE ENCOUNTER
----- Message from Mónica Guerrero sent at 7/29/2024  8:53 AM CDT -----  Regarding: Bone Scan Scheduled for 07/31  Good morning,    I am reaching out regarding the bone scan scheduled for this patient at [facility] on 07/31/24. This patient currently has [facility] Dual Complete HMO. This plan is out of network with the selected facility and does not have out of network benefits.    This plan is ONLY in-network with [facility] ([facility]), [facility]), [facility]), [facility] ([facility]), [facility]), [facility]), [facility]), [facility]), [facility]), [facility]), [facility]), [facility]).    I have attempted to reach out to notify the patient by phone, but there was no answer. I left a voicemail for the patient to call back and also sent her a SiOnyx message.    Thank you,  Dolly

## 2024-07-30 ENCOUNTER — TELEPHONE (OUTPATIENT)
Dept: GYNECOLOGIC ONCOLOGY | Facility: CLINIC | Age: 69
End: 2024-07-30
Payer: MEDICARE

## 2024-07-30 DIAGNOSIS — C57.7 MALIGNANT NEOPLASM OF OTHER SPECIFIED FEMALE GENITAL ORGANS: ICD-10-CM

## 2024-07-30 DIAGNOSIS — Z51.12 ENCOUNTER FOR ANTINEOPLASTIC CHEMOTHERAPY AND IMMUNOTHERAPY: ICD-10-CM

## 2024-07-30 DIAGNOSIS — Z51.11 ENCOUNTER FOR ANTINEOPLASTIC CHEMOTHERAPY AND IMMUNOTHERAPY: ICD-10-CM

## 2024-07-30 DIAGNOSIS — C54.1 ENDOMETRIAL ADENOCARCINOMA: ICD-10-CM

## 2024-07-30 DIAGNOSIS — Z51.11 ENCOUNTER FOR CHEMOTHERAPY MANAGEMENT: Primary | ICD-10-CM

## 2024-07-31 ENCOUNTER — NURSE TRIAGE (OUTPATIENT)
Dept: ADMINISTRATIVE | Facility: CLINIC | Age: 69
End: 2024-07-31
Payer: MEDICARE

## 2024-07-31 NOTE — TELEPHONE ENCOUNTER
Patient C/O cough times 2 days with congestion, has appointment for Monday but would like to be seen sooner. Triage done with  Dwight with [facility]. Dispo see today or tomorrow. Unable to book appointment at several locations, denies VV offered UC. Verb understanding. Reason for Disposition   Continuous (nonstop) coughing interferes with work or school and no improvement using cough treatment per Care Advice    Additional Information   Negative: Bluish (or gray) lips or face   Negative: SEVERE difficulty breathing (e.g., struggling for each breath, speaks in single words)   Negative: Rapid onset of cough and has hives   Negative: Coughing started suddenly after medicine, an allergic food or bee sting   Negative: Difficulty breathing after exposure to flames, smoke, or fumes   Negative: Sounds like a life-threatening emergency to the triager   Negative: Previous asthma attacks and this feels like asthma attack   Negative: Dry cough (non-productive; no sputum or minimal clear sputum) and within 14 days of COVID-19 Exposure   Negative: MODERATE difficulty breathing (e.g., speaks in phrases, SOB even at rest, pulse 100-120) and still present when not coughing   Negative: Chest pain present when not coughing   Negative: Passed out (i.e., fainted, collapsed and was not responding)   Negative: Patient sounds very sick or weak to the triager   Negative: MILD difficulty breathing (e.g., minimal/no SOB at rest, SOB with walking, pulse <100) and still present when not coughing   Negative: Coughed up > 1 tablespoon (15 ml) blood (Exception: Blood-tinged sputum.)   Negative: Fever > 103 F (39.4 C)   Negative: Fever > 101 F (38.3 C) and over 60 years of age   Negative: Fever > 100.0 F (37.8 C) and has diabetes mellitus or a weak immune system (e.g., HIV positive, cancer chemotherapy, organ transplant, splenectomy, chronic steroids)   Negative: Fever > 100.0 F (37.8 C) and bedridden (e.g., CVA, chronic illness,  recovering from surgery)   Negative: Increasing ankle swelling   Negative: Wheezing is present   Negative: SEVERE coughing spells (e.g., whooping sound after coughing, vomiting after coughing)   Negative: Coughing up marilyn-colored (reddish-brown) or blood-tinged sputum   Negative: Fever present > 3 days (72 hours)   Negative: Fever returns after gone for over 24 hours and symptoms worse or not improved   Negative: Using nasal washes and pain medicine > 24 hours and sinus pain persists   Negative: Known COPD or other severe lung disease (i.e., bronchiectasis, cystic fibrosis, lung surgery) and worsening symptoms (i.e., increased sputum purulence or amount, increased breathing difficulty)    Protocols used: Cough-A-OH

## 2024-07-31 NOTE — TELEPHONE ENCOUNTER
Called pt to inform of dexa scan no covered by insurance. Pt states she is in contact about coverage with insurance company. Pt requested to schedule appt to be seen for chest congestion. Pt appt has been scheduled.

## 2024-08-01 ENCOUNTER — TELEPHONE (OUTPATIENT)
Dept: GYNECOLOGIC ONCOLOGY | Facility: CLINIC | Age: 69
End: 2024-08-01
Payer: MEDICARE

## 2024-08-01 ENCOUNTER — TELEPHONE (OUTPATIENT)
Dept: INTERNAL MEDICINE | Facility: CLINIC | Age: 69
End: 2024-08-01
Payer: MEDICARE

## 2024-08-01 NOTE — TELEPHONE ENCOUNTER
----- Message from Monique Solano sent at 8/1/2024  8:35 AM CDT -----  Contact: [phone]  Patient is returning a phone call.    Who left a message for the patient: Penelope Zhong MA    Does patient know what this is regarding:  yes     Would you like a call back, or a response through your [facility] portal?:   call back     Comments:

## 2024-08-01 NOTE — TELEPHONE ENCOUNTER
Contacted via Phone with  Kelly.  Informed pt that [employer] Dual Medicare Plan was out of network for remaining care at [facility].    Pt stated she contacted her insurance [facility] Dual and spoke with representative. She was informed by [facility] [employer] Rep that she can continue to using Medicaid for her visits, and by next week everything would be fixed. She stated Medicaid never updated all of her information with them.    Reached out to Pre-Service Dept during call. Unable to connect or transfer the conference call.    Pt and  was provided with Pre-Service Rep Delaney contact phone number [phone] to further explain the reason for denial of CT Scan and out of network insurance with [facility].    Pt verbalized understanding.

## 2024-08-02 ENCOUNTER — TELEPHONE (OUTPATIENT)
Dept: GYNECOLOGIC ONCOLOGY | Facility: CLINIC | Age: 69
End: 2024-08-02
Payer: MEDICARE

## 2024-08-02 NOTE — TELEPHONE ENCOUNTER
----- Message from Alfie Omalley sent at 8/2/2024  9:25 AM CDT -----      Name of Who is Calling: JUAN DIEGO COUCH [[id number]]      What is the request in detail: [facility] called regarding CPT code for pt upcoming appt.Please contact to further discuss and advise.          Can the clinic reply by [facility]: Y      What Number to Call Back if not in [facility]: [facility] [phone] fax [phone]

## 2024-08-05 ENCOUNTER — TELEPHONE (OUTPATIENT)
Dept: GYNECOLOGIC ONCOLOGY | Facility: CLINIC | Age: 69
End: 2024-08-05
Payer: MEDICARE

## 2024-08-05 ENCOUNTER — OFFICE VISIT (OUTPATIENT)
Dept: INTERNAL MEDICINE | Facility: CLINIC | Age: 69
End: 2024-08-05
Payer: MEDICARE

## 2024-08-05 VITALS
BODY MASS INDEX: 34.39 KG/M2 | WEIGHT: 219.13 LBS | DIASTOLIC BLOOD PRESSURE: 66 MMHG | OXYGEN SATURATION: 100 % | HEIGHT: 67 IN | SYSTOLIC BLOOD PRESSURE: 118 MMHG | HEART RATE: 97 BPM

## 2024-08-05 DIAGNOSIS — C54.1 ENDOMETRIAL ADENOCARCINOMA: ICD-10-CM

## 2024-08-05 DIAGNOSIS — G62.9 NEUROPATHY: ICD-10-CM

## 2024-08-05 DIAGNOSIS — I10 PRIMARY HYPERTENSION: Primary | ICD-10-CM

## 2024-08-05 DIAGNOSIS — J40 BRONCHITIS: ICD-10-CM

## 2024-08-05 PROCEDURE — 3008F BODY MASS INDEX DOCD: CPT | Mod: CPTII,S$GLB,, | Performed by: INTERNAL MEDICINE

## 2024-08-05 PROCEDURE — 3078F DIAST BP <80 MM HG: CPT | Mod: CPTII,S$GLB,, | Performed by: INTERNAL MEDICINE

## 2024-08-05 PROCEDURE — 3288F FALL RISK ASSESSMENT DOCD: CPT | Mod: CPTII,S$GLB,, | Performed by: INTERNAL MEDICINE

## 2024-08-05 PROCEDURE — 99999 PR PBB SHADOW E&M-EST. PATIENT-LVL III: CPT | Mod: PBBFAC,,, | Performed by: INTERNAL MEDICINE

## 2024-08-05 PROCEDURE — 1159F MED LIST DOCD IN RCRD: CPT | Mod: CPTII,S$GLB,, | Performed by: INTERNAL MEDICINE

## 2024-08-05 PROCEDURE — 3044F HG A1C LEVEL LT 7.0%: CPT | Mod: CPTII,S$GLB,, | Performed by: INTERNAL MEDICINE

## 2024-08-05 PROCEDURE — 3074F SYST BP LT 130 MM HG: CPT | Mod: CPTII,S$GLB,, | Performed by: INTERNAL MEDICINE

## 2024-08-05 PROCEDURE — 1101F PT FALLS ASSESS-DOCD LE1/YR: CPT | Mod: CPTII,S$GLB,, | Performed by: INTERNAL MEDICINE

## 2024-08-05 PROCEDURE — 1160F RVW MEDS BY RX/DR IN RCRD: CPT | Mod: CPTII,S$GLB,, | Performed by: INTERNAL MEDICINE

## 2024-08-05 PROCEDURE — 99214 OFFICE O/P EST MOD 30 MIN: CPT | Mod: S$GLB,,, | Performed by: INTERNAL MEDICINE

## 2024-08-05 RX ORDER — BENZONATATE 100 MG/1
100 CAPSULE ORAL 3 TIMES DAILY PRN
Qty: 30 CAPSULE | Refills: 1 | Status: SHIPPED | OUTPATIENT
Start: 2024-08-05

## 2024-08-05 RX ORDER — DOXYCYCLINE HYCLATE 100 MG
100 TABLET ORAL EVERY 12 HOURS
Qty: 20 TABLET | Refills: 0 | Status: SHIPPED | OUTPATIENT
Start: 2024-08-05 | End: 2024-08-15

## 2024-08-20 ENCOUNTER — TELEPHONE (OUTPATIENT)
Dept: GYNECOLOGIC ONCOLOGY | Facility: CLINIC | Age: 69
End: 2024-08-20
Payer: MEDICARE

## 2024-08-20 NOTE — TELEPHONE ENCOUNTER
----- Message from Alexa Funes sent at 8/20/2024  8:34 AM CDT -----  Name of Who is Calling: Makenna with [facility] is calling on behalf of JUAN DIEGO COUCH [[id number]]          What is the request in detail: Makenna need a copy of tempus report faxed to [phone].           Can the clinic reply by [facility]: No          What Number to Call Back if not in [facility]: [phone]

## 2024-08-28 RX ORDER — FLUTICASONE PROPIONATE 50 MCG
1 SPRAY, SUSPENSION (ML) NASAL DAILY
Qty: 9.9 ML | Refills: 11 | Status: SHIPPED | OUTPATIENT
Start: 2024-08-28

## 2024-08-28 NOTE — TELEPHONE ENCOUNTER
No care due was identified.  [facility] Embedded Care Due Messages. Reference number: [id number].   8/28/2024 12:48:44 PM CDT

## 2024-08-28 NOTE — TELEPHONE ENCOUNTER
Refill Routing Note   Medication(s) are not appropriate for processing by [facility] for the following reason(s):        Outside of protocol: non-delegated    [facility] action(s):  Route  Approve      Medication Therapy Plan:         Appointments  past 12m or future 3m with PCP    Date Provider   Last Visit   8/5/2024 Cassy Martin MD   Next Visit   9/4/2024 Cassy Martin MD   ED visits in past 90 days: 0        Note composed:4:49 PM 08/28/2024

## 2024-08-29 RX ORDER — KETOCONAZOLE 20 MG/ML
SHAMPOO, SUSPENSION TOPICAL
Qty: 120 ML | Refills: 10 | Status: SHIPPED | OUTPATIENT
Start: 2024-08-29

## 2024-11-07 RX ORDER — GABAPENTIN 100 MG/1
CAPSULE ORAL
Qty: 28 CAPSULE | Refills: 10 | Status: SHIPPED | OUTPATIENT
Start: 2024-11-07

## 2024-11-07 NOTE — TELEPHONE ENCOUNTER
No care due was identified.  [facility] Embedded Care Due Messages. Reference number: [id number].   11/07/2024 1:18:55 PM CST

## 2024-11-15 ENCOUNTER — TELEPHONE (OUTPATIENT)
Dept: INTERNAL MEDICINE | Facility: CLINIC | Age: 69
End: 2024-11-15
Payer: MEDICARE

## 2024-11-15 NOTE — TELEPHONE ENCOUNTER
----- Message from Narayan sent at 11/15/2024  8:41 AM CST -----  Contact: Pt. Portal  .Type:  Sooner Apoointment Request    Caller is requesting a sooner appointment.  Caller declined first available appointment listed below.  Caller will not accept being placed on the waitlist and is requesting a message be sent to doctor.  Name of Caller:pt  When is the first available appointment?  Symptoms:check up  Would the patient rather a call back or a response via [facility]?  Call back  Best Call Back Number:[phone]  Additional Information: Need to refills my Bp med

## 2024-11-18 ENCOUNTER — OFFICE VISIT (OUTPATIENT)
Dept: INTERNAL MEDICINE | Facility: CLINIC | Age: 69
End: 2024-11-18
Payer: MEDICARE

## 2024-11-18 VITALS
HEART RATE: 86 BPM | SYSTOLIC BLOOD PRESSURE: 138 MMHG | DIASTOLIC BLOOD PRESSURE: 80 MMHG | HEIGHT: 67 IN | OXYGEN SATURATION: 99 % | BODY MASS INDEX: 35.54 KG/M2 | WEIGHT: 226.44 LBS

## 2024-11-18 DIAGNOSIS — Z00.00 ANNUAL PHYSICAL EXAM: Primary | ICD-10-CM

## 2024-11-18 DIAGNOSIS — C54.1 ENDOMETRIAL ADENOCARCINOMA: ICD-10-CM

## 2024-11-18 DIAGNOSIS — T45.1X5A NEUROPATHY DUE TO CHEMOTHERAPEUTIC DRUG: ICD-10-CM

## 2024-11-18 DIAGNOSIS — R73.9 HYPERGLYCEMIA: ICD-10-CM

## 2024-11-18 DIAGNOSIS — G62.0 NEUROPATHY DUE TO CHEMOTHERAPEUTIC DRUG: ICD-10-CM

## 2024-11-18 DIAGNOSIS — I10 PRIMARY HYPERTENSION: ICD-10-CM

## 2024-11-18 DIAGNOSIS — I10 HYPERTENSION, UNSPECIFIED TYPE: ICD-10-CM

## 2024-11-18 PROCEDURE — 99999 PR PBB SHADOW E&M-EST. PATIENT-LVL IV: CPT | Mod: PBBFAC,,, | Performed by: INTERNAL MEDICINE

## 2024-11-18 PROCEDURE — 3075F SYST BP GE 130 - 139MM HG: CPT | Mod: CPTII,S$GLB,, | Performed by: INTERNAL MEDICINE

## 2024-11-18 PROCEDURE — 1160F RVW MEDS BY RX/DR IN RCRD: CPT | Mod: CPTII,S$GLB,, | Performed by: INTERNAL MEDICINE

## 2024-11-18 PROCEDURE — 3079F DIAST BP 80-89 MM HG: CPT | Mod: CPTII,S$GLB,, | Performed by: INTERNAL MEDICINE

## 2024-11-18 PROCEDURE — 3288F FALL RISK ASSESSMENT DOCD: CPT | Mod: CPTII,S$GLB,, | Performed by: INTERNAL MEDICINE

## 2024-11-18 PROCEDURE — 1101F PT FALLS ASSESS-DOCD LE1/YR: CPT | Mod: CPTII,S$GLB,, | Performed by: INTERNAL MEDICINE

## 2024-11-18 PROCEDURE — 1126F AMNT PAIN NOTED NONE PRSNT: CPT | Mod: CPTII,S$GLB,, | Performed by: INTERNAL MEDICINE

## 2024-11-18 PROCEDURE — 3008F BODY MASS INDEX DOCD: CPT | Mod: CPTII,S$GLB,, | Performed by: INTERNAL MEDICINE

## 2024-11-18 PROCEDURE — 99397 PER PM REEVAL EST PAT 65+ YR: CPT | Mod: S$GLB,,, | Performed by: INTERNAL MEDICINE

## 2024-11-18 PROCEDURE — 1159F MED LIST DOCD IN RCRD: CPT | Mod: CPTII,S$GLB,, | Performed by: INTERNAL MEDICINE

## 2024-11-18 PROCEDURE — 3044F HG A1C LEVEL LT 7.0%: CPT | Mod: CPTII,S$GLB,, | Performed by: INTERNAL MEDICINE

## 2024-11-18 RX ORDER — LOSARTAN POTASSIUM AND HYDROCHLOROTHIAZIDE 25; 100 MG/1; MG/1
1 TABLET ORAL DAILY
Qty: 90 TABLET | Refills: 3 | Status: SHIPPED | OUTPATIENT
Start: 2024-11-18

## 2024-11-18 RX ORDER — FLUTICASONE PROPIONATE 50 MCG
1 SPRAY, SUSPENSION (ML) NASAL DAILY
Qty: 9.9 ML | Refills: 11 | Status: SHIPPED | OUTPATIENT
Start: 2024-11-18

## 2024-11-18 RX ORDER — AMLODIPINE BESYLATE 10 MG/1
10 TABLET ORAL DAILY
Qty: 90 TABLET | Refills: 4 | Status: SHIPPED | OUTPATIENT
Start: 2024-11-18

## 2024-11-18 RX ORDER — OLANZAPINE 5 MG/1
1 TABLET ORAL NIGHTLY
COMMUNITY
Start: 2024-07-22

## 2024-11-18 NOTE — PROGRESS NOTES
"Subjective:       Patient ID: Cherri Gilmore is a 69 y.o. female.    Chief Complaint: Annual Exam  This is a 69-year-old who presents today for physical she reports since last visit she has been doing well she is following with Hematology Oncology at [place] undergoing endometrial cancer treatments without difficulty reports she seems to be tolerating pretty well she did lose her hair and has had some neuropathy but finds overall seems to be doing well with her regimen she reports she has about 3 more treatments to go she is currently living here and her son is living with her and helping with her care she reports she maybe moving back to [place] when she is done with all her treatments.  She does have some trouble with rhinitis and allergies and occasional left ear discomfort.  She notes she did do her mammogram recently and had a previous colonoscopy in [place] about 5 years ago that was normal she has no record of that    HPI  Review of Systems   Constitutional:  Negative for fever.   HENT:          Rhinitis ear pain at time     Hair thinning   Respiratory:  Negative for cough, shortness of breath and wheezing.    Cardiovascular:  Negative for chest pain and palpitations.   Gastrointestinal:  Negative for abdominal pain and constipation.   Neurological:  Negative for dizziness.        Neuropathy with chemo        Objective:      Blood pressure 138/80, pulse 86, height 5' 7" (1.702 m), weight 102.7 kg (226 lb 6.6 oz), SpO2 99%.   Physical Exam  Constitutional:       General: She is not in acute distress.  HENT:      Head: Normocephalic.      Comments: Hair loss      Mouth/Throat:      Pharynx: Oropharynx is clear.   Eyes:      General: No scleral icterus.  Cardiovascular:      Rate and Rhythm: Normal rate and regular rhythm.      Heart sounds: Normal heart sounds. No murmur heard.     No friction rub. No gallop.      Comments: Breast : normal no masses or tenderness      Pulmonary:      Effort: " Pulmonary effort is normal. No respiratory distress.      Breath sounds: Normal breath sounds.   Abdominal:      General: Bowel sounds are normal.      Palpations: Abdomen is soft. There is no mass.      Tenderness: There is no abdominal tenderness.      Comments: Surgical scar    Musculoskeletal:      Cervical back: Neck supple.   Skin:     Findings: No erythema.   Neurological:      Mental Status: She is alert.   Psychiatric:         Mood and Affect: Mood normal.         Assessment:       1. Annual physical exam    2. Hypertension, unspecified type    3. Endometrial adenocarcinoma    4. Hyperglycemia    5. Neuropathy due to chemotherapeutic drug    6. Primary hypertension        Plan:       Cherri was seen today for annual exam.    Diagnoses and all orders for this visit:    Annual physical exam    Hypertension, unspecified type  Has been well controlled remains on amlodipine losartan hydrochlorothiazide refill provided  -     amLODIPine (NORVASC) 10 MG tablet; Take 1 tablet (10 mg total) by mouth once daily.  -     losartan-hydrochlorothiazide 100-25 mg (HYZAAR) 100-25 mg per tablet; Take 1 tablet by mouth once daily.  -     Ambulatory referral/consult to Optometry; Future    Endometrial adenocarcinoma  Continues to follow with Hematology Oncology at [place] for treatments    Hyperglycemia  No history of diabetes she is getting steroids during her treatments recent labs reviewed she declines additional blood work at this time    For rhinitis occasionally Eustachian dysfunction prescription provided  -     fluticasone propionate (FLONASE) 50 mcg/actuation nasal spray; 1 spray (50 mcg total) by Each Nostril route once daily.    She will return for follow-up in February sooner if concern we discussed flu shot she reports they plan to wait until she is done with her chemo treatments

## 2024-12-09 ENCOUNTER — TELEPHONE (OUTPATIENT)
Dept: INTERNAL MEDICINE | Facility: CLINIC | Age: 69
End: 2024-12-09
Payer: MEDICARE

## 2024-12-09 NOTE — TELEPHONE ENCOUNTER
----- Message from Med Assistant Fong sent at 12/9/2024  3:19 PM CST -----  Contact: [phone]  Type:  Caller requesting an Appt    Who Called: Estrella ([employer]) called      Additional Information:Ms De La Rosa is calling in regards to needing staff to give pt a call back to get  an Enhanced annual wellness visit scheduled.    Best Call Back Number:[phone]

## 2024-12-09 NOTE — TELEPHONE ENCOUNTER
----- Message from Med Assistant Fong sent at 12/9/2024  3:19 PM CST -----  Contact: [phone]  Type:  Caller requesting an Appt    Who Called: Estrella ([employer]) called      Additional Information:Ms De La Rosa is calling in regards to needing staff to give pt a call back to get  an Enhanced annual wellness visit scheduled.    Best Call Back Number:[phone]

## 2024-12-10 RX ORDER — FLUTICASONE PROPIONATE 50 MCG
SPRAY, SUSPENSION (ML) NASAL
Qty: 16 G | Refills: 10 | OUTPATIENT
Start: 2024-12-10

## 2024-12-10 RX ORDER — FLUTICASONE PROPIONATE 50 MCG
1 SPRAY, SUSPENSION (ML) NASAL
Qty: 32 G | Refills: 2 | Status: SHIPPED | OUTPATIENT
Start: 2024-12-10

## 2024-12-10 NOTE — TELEPHONE ENCOUNTER
Refill Decision Note   Cherri Gilmore  is requesting a refill authorization.  Brief Assessment and Rationale for Refill:  Approve     Medication Therapy Plan:        Comments:     Note composed:12:47 PM 12/10/2024

## 2024-12-10 NOTE — TELEPHONE ENCOUNTER
No care due was identified.  [facility] Embedded Care Due Messages. Reference number: [id number].   12/10/2024 12:26:28 PM CST

## 2024-12-10 NOTE — TELEPHONE ENCOUNTER
Quick DC. Duplicate Request-  med pended in previous encounter, awaiting assessment    Refill Authorization Note   Cherri Gilmore  is requesting a refill authorization.  Brief Assessment and Rationale for Refill:  Quick Discontinue  Medication Therapy Plan:  DUPLICATE    Medication Reconciliation Completed:  No      Comments:   Pended Medication(s)       Requested Prescriptions     Pending Prescriptions Disp Refills    fluticasone propionate (FLONASE) 50 mcg/actuation nasal spray [Pharmacy Med Name: FLUTICASONE PROP 50 MCG SPR 50 SONAM] 16 g 10     Sig: USE 1 SPRAY INTO EACH NOSTRIL ONCE DAILY        Duplicate Pended Encounter(s)/ Last Prescribed Details: (includes pharmacy & prescriber details)              Note composed:12:45 PM 12/10/2024

## 2024-12-10 NOTE — TELEPHONE ENCOUNTER
No care due was identified.  [facility] Embedded Care Due Messages. Reference number: [id number].   12/10/2024 12:26:48 PM CST

## 2024-12-11 ENCOUNTER — TELEPHONE (OUTPATIENT)
Dept: INTERNAL MEDICINE | Facility: CLINIC | Age: 69
End: 2024-12-11
Payer: MEDICARE

## 2024-12-11 NOTE — TELEPHONE ENCOUNTER
----- Message from Anisha sent at 12/11/2024 11:05 AM CST -----  Contact: [phone] Pt  Patient is returning a phone call.     Who left a message for the patient:nurse      Does patient know what this is regarding:  appt      Would you like a call back, or a response through your [facility] portal?:Call back      Comments:Pt would like a call back from nurse pt states she missed a call from nurse.

## 2024-12-12 RX ORDER — FLUTICASONE PROPIONATE 50 MCG
SPRAY, SUSPENSION (ML) NASAL
Qty: 16 G | Refills: 10 | OUTPATIENT
Start: 2024-12-12

## 2024-12-12 NOTE — TELEPHONE ENCOUNTER
No care due was identified.  [facility] Embedded Care Due Messages. Reference number: [id number].   12/12/2024 2:59:44 PM CST

## 2024-12-13 NOTE — TELEPHONE ENCOUNTER
Quick DC. Inappropriate Request    Refill Authorization Note   Cherri Gilmore  is requesting a refill authorization.  Brief Assessment and Rationale for Refill:  Quick Discontinue  Medication Therapy Plan:  Status: Receipt confirmed by pharmacy (12/10/2024    Medication Reconciliation Completed:  No      Comments:     Note composed:8:01 PM 12/12/2024

## 2025-02-19 RX ORDER — BENZONATATE 100 MG/1
100 CAPSULE ORAL 3 TIMES DAILY PRN
Qty: 30 CAPSULE | Refills: 1 | Status: SHIPPED | OUTPATIENT
Start: 2025-02-19

## 2025-05-16 ENCOUNTER — CLINICAL SUPPORT (OUTPATIENT)
Dept: REHABILITATION | Facility: HOSPITAL | Age: 70
End: 2025-05-16
Payer: MEDICARE

## 2025-05-16 DIAGNOSIS — M25.662 DECREASED RANGE OF MOTION OF LEFT KNEE: Primary | ICD-10-CM

## 2025-05-16 PROCEDURE — 97161 PT EVAL LOW COMPLEX 20 MIN: CPT

## 2025-05-16 PROCEDURE — 97530 THERAPEUTIC ACTIVITIES: CPT

## 2025-05-16 NOTE — LETTER
May 17, 2025  Jennifer Naranjo NP  [address] [facility] [place] [address]    To whom it may concern,     The attached plan of care is being sent to you for review and reference.    You may indicate your approval by signing the document electronically, or by faxing/mailing a signed copy of the final page of this document back to the attention of Jermaine Dallas PT:         Plan of Care 5/17/25   Effective from: 5/17/2025  Effective to: 7/25/2025    Plan ID: [id number]            Participants as of Finalize on 5/17/2025    Name Type Comments Contact Info    Jennifer Naranjo NP Referring Provider  [phone]    Jermaine Dallas PT Physical Therapist         Last Plan Note     Author: Jermaine Dallas PT Status: Addendum Last edited: 5/16/2025 10:00 AM           Outpatient Rehab    Physical Therapy Evaluation    Patient Name: Cherri Gilmore  MRN: [id number]  YOB: 1955  Encounter Date: 5/16/2025    Therapy Diagnosis:   Encounter Diagnosis   Name Primary?    Decreased range of motion of left knee Yes     Physician: Jennifer Naranjo NP    Physician Orders: Eval and Treat  Medical Diagnosis: Left knee pain  Sciatica    Visit # / Visits Authorized:  1 / 1  Insurance Authorization Period: 5/1/2025 to 5/1/2026  Date of Evaluation: 5/16/2025  Plan of Care Certification: 5/16/2025 to 7/25/2025     Time In: 0815   Time Out: 0900  Total Time (in minutes): 45   Total Billable Time (in minutes): 45    Intake Outcome Measure for FOTO Survey    Therapist reviewed FOTO scores for Cherri Gilmore on 5/16/2025.   FOTO report - see Media section or FOTO account episode details.     Intake Score: 31%    Precautions:       Subjective   History of Present Illness  Cherri is a 70 y.o. female who reports to physical therapy with a chief concern of left knee pain.                 History of Present Condition/Illness: Patient states left knee pain beginning about 6  months ago and states she had began chemotherapy about 1 year ago. 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 Patient states pain is mostly bothersome after she has been sitting for a while and gets up from low chair or from lower surface. States she will go on a 10-15 minute walk each morning with her 2 year old grandson and that this usually helps her knee to feel better. States morning stiffness will typically get better after an hour or so. Denies any recent falls.     Pain     Patient reports a current pain level of 3/10. Pain at best is reported as 2/10. Pain at worst is reported as 6/10.   Location: left knee  Clinical Progression (since onset): Stable  Pain Qualities: Aching, Dull, Discomfort, Tightness  Pain-Relieving Factors: Activity modification, Heat, Movement, Walking, Other (Comment), Medications - over-the-counter  Other Pain-Relieving Factors: Tylenol  Pain-Aggravating Factors: Sitting, Stair climbing, Transfers         Review of Systems  Patient reports: Cancer History and Osteoarthritis  Patient denies: Bladder Incontinence, Bowel Incontinence, Lower Extremity Neurological Deficits, Saddle Numbness, Weight Gain, Weight Loss, and Trauma History          Past Medical History/Physical Systems Review:   Cherri Gilmore  has a past medical history of Adenocarcinoma of endometrium and Essential (primary) hypertension.    Chreri Gilmore  has a past surgical history that includes Tubal ligation; Dilation and curettage of uterus; Robot-assisted laparoscopic abdominal hysterectomy using da Dk Xi (N/A, 4/12/2024); Robot-assisted laparoscopic salpingo-oophorectomy using da Dk Xi (Bilateral, 4/12/2024); mapping, lymph node, sentinel (N/A, 4/12/2024); and Lymph node biopsy (N/A, 4/12/2024).    Cherri has a current medication list which includes the following prescription(s): acetaminophen, amlodipine, benzonatate,  dexamethasone, fluticasone propionate, gabapentin, ketoconazole, losartan-hydrochlorothiazide 100-25 mg, olanzapine, and prochlorperazine.    Review of patient's allergies indicates:   Allergen Reactions    Vicodin [hydrocodone-acetaminophen] Anxiety     Can take tylenol        Objective      Lower Extremity Sensation  Right Lumbar/Lower Extremity Sensation  Intact: Light Touch       Left Lumbar/Lower Extremity Sensation  Intact: Light Touch                 Knee Swelling  Location of Measurement Right  (cm) Left  (cm)   20 cm Above Joint Line       10 cm Vastus Medialis Oblique       At Joint Line 53 54   15 cm Below Joint Line 47 48             Knee Range of Motion   Right Knee   Active (deg) Passive (deg) Pain   Flexion 130 135     Extension 0 0         Left Knee   Active (deg) Passive (deg) Pain   Flexion 120 123     Extension 0 0                        Knee Strength   Right Strength Right Pain Left Strength Left  Pain   Flexion (S2) 4-   4-     Prone Flexion 4-   4-     Extension (L3) 4-   3+ Yes     Handheld Dynamometer knee extension =    R 27#      L 23# *with pain        Knee Special Tests  Other Knee Special Tests  Negative: Right Hoffa's and Left Hoffa's                Knee Patellar Screening       Right Patellar Position Tests  Negative: Compression  Left Patellar Position Tests  Positive: Compression              Ambulation Assistance Required  Surface With  Assistive Device Without Assistive Device Details   Level   Independent      Uneven   Independent     Curb           Gait Analysis  Base of Support: Normal  Walking Speed: Decreased                   Treatment:  Therapeutic Activity  TA 1: HEP: Quad sets, heel slides for knee flexion AROM, SLR from chair, bridges    Time Entry(in minutes):  PT Evaluation (Low) Time Entry: 30  Therapeutic Activity Time Entry: 15    Assessment & Plan   Assessment  Cherri presents with a condition of Low complexity.   Presentation of Symptoms: Stable  Will Comorbidities  Impact Care: Yes       Functional Limitations: Activity tolerance, Ambulating on uneven surfaces, Gait limitations, Decreased ambulation distance/endurance, Increased risk of fall, Maintaining balance, Proprioception, Range of motion, Pain with ADLs/IADLs, Painful locomotion/ambulation    Patient Goal for Therapy (PT): reduce left knee pain  Prognosis: Good  Assessment Details: Patient presents to therapy with chronic left knee pain and has decreased range of motion, decreased quadriceps strength, and decreased gait speed. Patient has been limited with ADLs and prolonged ambulation and transfers due to left knee pain and weakness. Patient would benefit from skilled therapy in order to improve upon these deficits and return to prior level of function.     Plan  From a physical therapy perspective, the patient would benefit from: Skilled Rehab Services    Planned therapy interventions include: Therapeutic exercise, Therapeutic activities, Neuromuscular re-education, Manual therapy, and ADLs/IADLs.            Visit Frequency: 2 times Per Week for 8 Weeks.       This plan was discussed with Patient.   Discussion participants: Agreed Upon Plan of Care             Patient's spiritual, cultural, and educational needs considered and patient agreeable to plan of care and goals.           Goals:   Active       Physical Therapy       Physical Therapy Goal       Start:  05/17/25    Expected End:  07/25/25       Short Term Goals:  4 weeks  1.Report decreased left knee pain  < / = 2/10 to increase tolerance for progressing exercises.   2. Increase knee ROM to 130 degrees of flexion in order to be able to perform ADLs without difficulty.  3. Increase strength by 1/3 MMT grade in left knee extension to increase tolerance for ADL and prolonged ambulation/transfer activities.  4. Pt to tolerate HEP to improve ROM and independence with ADL's    Long Term Goals: 8 weeks  1.Report decreased left knee pain < / = 1/10 to increase  tolerance for returning to longer walking periods.   2.Patient goal: reduce left knee pain and improve transfer ability.   3.Increase strength to >/= 4/5 in left knee extension  to increase tolerance for ADL and transfer activities.  4. Pt will report at >/= 58% on FOTO knee to demonstrate increase in LE function with every day tasks.               Jermaine Dallas PT         Current Participants as of 5/17/2025    Name Type Comments Contact Info    Jennifer Naranjo NP Referring Provider  [phone]    Signature pending    Jermaine Dallas PT Physical Therapist                  Sincerely,      Jermaine Dallas PT  [facility]                                                            Dear Jermaine Dallas PT,    RE: Ms. Cherri Gilmore, MRN: [id number]    I certify that I have reviewed the attached plan of care and agree to the details within.        ___________________________  ___________________________  Provider Printed Name   Provider Signed Name      ___________________________  Date and Time

## 2025-05-16 NOTE — PROGRESS NOTES
Outpatient Rehab    Physical Therapy Evaluation    Patient Name: Cherri Gilmore  MRN: [id number]  YOB: 1955  Encounter Date: 5/16/2025    Therapy Diagnosis:   Encounter Diagnosis   Name Primary?    Decreased range of motion of left knee Yes     Physician: Jennifer Naranjo NP    Physician Orders: Eval and Treat  Medical Diagnosis: Left knee pain  Sciatica    Visit # / Visits Authorized:  1 / 1  Insurance Authorization Period: 5/1/2025 to 5/1/2026  Date of Evaluation: 5/16/2025  Plan of Care Certification: 5/16/2025 to 7/25/2025     Time In: 0815   Time Out: 0900  Total Time (in minutes): 45   Total Billable Time (in minutes): 45    Intake Outcome Measure for FOTO Survey    Therapist reviewed FOTO scores for Cherri Gilmore on 5/16/2025.   FOTO report - see Media section or FOTO account episode details.     Intake Score: 31%    Precautions:       Subjective   History of Present Illness  Cherri is a 70 y.o. female who reports to physical therapy with a chief concern of left knee pain.                 History of Present Condition/Illness: Patient states left knee pain beginning about 6 months ago and states she had began chemotherapy about 1 year ago. 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 Patient states pain is mostly bothersome after she has been sitting for a while and gets up from low chair or from lower surface. States she will go on a 10-15 minute walk each morning with her 2 year old grandson and that this usually helps her knee to feel better. States morning stiffness will typically get better after an hour or so. Denies any recent falls.     Pain     Patient reports a current pain level of 3/10. Pain at best is reported as 2/10. Pain at worst is reported as 6/10.   Location: left knee  Clinical Progression (since onset): Stable  Pain Qualities: Aching, Dull, Discomfort, Tightness  Pain-Relieving  Factors: Activity modification, Heat, Movement, Walking, Other (Comment), Medications - over-the-counter  Other Pain-Relieving Factors: Tylenol  Pain-Aggravating Factors: Sitting, Stair climbing, Transfers         Review of Systems  Patient reports: Cancer History and Osteoarthritis  Patient denies: Bladder Incontinence, Bowel Incontinence, Lower Extremity Neurological Deficits, Saddle Numbness, Weight Gain, Weight Loss, and Trauma History          Past Medical History/Physical Systems Review:   Cherri Gilmore  has a past medical history of Adenocarcinoma of endometrium and Essential (primary) hypertension.    Cherri Gilmore  has a past surgical history that includes Tubal ligation; Dilation and curettage of uterus; Robot-assisted laparoscopic abdominal hysterectomy using da Dk Xi (N/A, 4/12/2024); Robot-assisted laparoscopic salpingo-oophorectomy using da Dk Xi (Bilateral, 4/12/2024); mapping, lymph node, sentinel (N/A, 4/12/2024); and Lymph node biopsy (N/A, 4/12/2024).    Cherri has a current medication list which includes the following prescription(s): acetaminophen, amlodipine, benzonatate, dexamethasone, fluticasone propionate, gabapentin, ketoconazole, losartan-hydrochlorothiazide 100-25 mg, olanzapine, and prochlorperazine.    Review of patient's allergies indicates:   Allergen Reactions    Vicodin [hydrocodone-acetaminophen] Anxiety     Can take tylenol        Objective      Lower Extremity Sensation  Right Lumbar/Lower Extremity Sensation  Intact: Light Touch       Left Lumbar/Lower Extremity Sensation  Intact: Light Touch                 Knee Swelling  Location of Measurement Right  (cm) Left  (cm)   20 cm Above Joint Line       10 cm Vastus Medialis Oblique       At Joint Line 53 54   15 cm Below Joint Line 47 48             Knee Range of Motion   Right Knee   Active (deg) Passive (deg) Pain   Flexion 130 135     Extension 0 0         Left Knee   Active (deg) Passive (deg) Pain    Flexion 120 123     Extension 0 0                        Knee Strength   Right Strength Right Pain Left Strength Left  Pain   Flexion (S2) 4-   4-     Prone Flexion 4-   4-     Extension (L3) 4-   3+ Yes     Handheld Dynamometer knee extension =    R 27#      L 23# *with pain        Knee Special Tests  Other Knee Special Tests  Negative: Right Hoffa's and Left Hoffa's                Knee Patellar Screening       Right Patellar Position Tests  Negative: Compression  Left Patellar Position Tests  Positive: Compression              Ambulation Assistance Required  Surface With  Assistive Device Without Assistive Device Details   Level   Independent      Uneven   Independent     Curb           Gait Analysis  Base of Support: Normal  Walking Speed: Decreased                   Treatment:  Therapeutic Activity  TA 1: HEP: Quad sets, heel slides for knee flexion AROM, SLR from chair, bridges    Time Entry(in minutes):  PT Evaluation (Low) Time Entry: 30  Therapeutic Activity Time Entry: 15    Assessment & Plan   Assessment  Cherri presents with a condition of Low complexity.   Presentation of Symptoms: Stable  Will Comorbidities Impact Care: Yes       Functional Limitations: Activity tolerance, Ambulating on uneven surfaces, Gait limitations, Decreased ambulation distance/endurance, Increased risk of fall, Maintaining balance, Proprioception, Range of motion, Pain with ADLs/IADLs, Painful locomotion/ambulation    Patient Goal for Therapy (PT): reduce left knee pain  Prognosis: Good  Assessment Details: Patient presents to therapy with chronic left knee pain and has decreased range of motion, decreased quadriceps strength, and decreased gait speed. Patient has been limited with ADLs and prolonged ambulation and transfers due to left knee pain and weakness. Patient would benefit from skilled therapy in order to improve upon these deficits and return to prior level of function.     Plan  From a physical therapy perspective,  the patient would benefit from: Skilled Rehab Services    Planned therapy interventions include: Therapeutic exercise, Therapeutic activities, Neuromuscular re-education, Manual therapy, and ADLs/IADLs.            Visit Frequency: 2 times Per Week for 8 Weeks.       This plan was discussed with Patient.   Discussion participants: Agreed Upon Plan of Care             Patient's spiritual, cultural, and educational needs considered and patient agreeable to plan of care and goals.           Goals:   Active       Physical Therapy       Physical Therapy Goal       Start:  05/17/25    Expected End:  07/25/25       Short Term Goals:  4 weeks  1.Report decreased left knee pain  < / = 2/10 to increase tolerance for progressing exercises.   2. Increase knee ROM to 130 degrees of flexion in order to be able to perform ADLs without difficulty.  3. Increase strength by 1/3 MMT grade in left knee extension to increase tolerance for ADL and prolonged ambulation/transfer activities.  4. Pt to tolerate HEP to improve ROM and independence with ADL's    Long Term Goals: 8 weeks  1.Report decreased left knee pain < / = 1/10 to increase tolerance for returning to longer walking periods.   2.Patient goal: reduce left knee pain and improve transfer ability.   3.Increase strength to >/= 4/5 in left knee extension  to increase tolerance for ADL and transfer activities.  4. Pt will report at >/= 58% on FOTO knee to demonstrate increase in LE function with every day tasks.               Jermaine Dallas, PT

## 2025-05-30 ENCOUNTER — CLINICAL SUPPORT (OUTPATIENT)
Dept: REHABILITATION | Facility: HOSPITAL | Age: 70
End: 2025-05-30
Payer: MEDICARE

## 2025-05-30 DIAGNOSIS — M25.662 DECREASED RANGE OF MOTION OF LEFT KNEE: Primary | ICD-10-CM

## 2025-05-30 PROCEDURE — 97112 NEUROMUSCULAR REEDUCATION: CPT

## 2025-05-30 NOTE — PROGRESS NOTES
"  Outpatient Rehab    Physical Therapy Visit    Patient Name: Cherri Gilmore  MRN: [id number]  YOB: 1955  Encounter Date: 5/30/2025    Therapy Diagnosis:   Encounter Diagnosis   Name Primary?    Decreased range of motion of left knee Yes     Physician: Jennifer Naranjo NP    Physician Orders: Eval and Treat  Medical Diagnosis: Left knee pain  Sciatica    Visit # / Visits Authorized:  1 / 10  Insurance Authorization Period: 5/16/2025 to 12/31/2025  Date of Evaluation: 5/16/2025  Plan of Care Certification: 5/17/2025 to 7/25/2025      PT/PTA:     Number of PTA visits since last PT visit:   Time In: 1002   Time Out: 1052  Total Time (in minutes): 50   Total Billable Time (in minutes): 25    FOTO:  Intake Score:  %  Survey Score 2:  %  Survey Score 3:  %    Precautions:       Subjective   still having left knee soreness but is able to go on her walks with her grandson. states she is in the process of moving with her family but she mainly helps with packing and not with lifting.  Pain reported as 3/10.      Objective            Treatment:  Therapeutic Exercise  TE 1: Standing heel raises 2 x 12  TE 2: HEP review  Manual Therapy  MT 1: patellar mobilizations grades I/II  MT 2: fat pad mobilizations grades I/II  Balance/Neuromuscular Re-Education  NMR 1: quad sets 2 x 10 for 5" holds  NMR 2: quad sets with slr from supine 2 x 10  NMR 3: unilateral clamshells GTB 2 x 12  NMR 4: Bridges with GTB 2 x 12  NMR 5: TKEs with GTB 3 x 10 with 3" holds    Time Entry(in minutes):  Manual Therapy Time Entry: 8  Neuromuscular Re-Education Time Entry: 32  Therapeutic Exercise Time Entry: 10    Assessment & Plan   Assessment: Patient presents with mild left knee supra-patellar swelling and infrapatellar swelling as well as decreased gait speed. She displays mild decrease in knee extension during stance phase of gait. Able to work on quad, hip, and calf strengthening to help offload left knee " joint.  Evaluation/Treatment Tolerance: Patient tolerated treatment well    The patient will continue to benefit from skilled outpatient physical therapy in order to address the deficits listed in the problem list on the initial evaluation, provide patient and family education, and maximize the patients level of independence in the home and community environments.     The patient's spiritual, cultural, and educational needs were considered, and the patient is agreeable to the plan of care and goals.           Plan:      Goals:   Active       Physical Therapy       Physical Therapy Goal (Progressing)       Start:  05/17/25    Expected End:  07/25/25       Short Term Goals:  4 weeks  1.Report decreased left knee pain  < / = 2/10 to increase tolerance for progressing exercises.   2. Increase knee ROM to 130 degrees of flexion in order to be able to perform ADLs without difficulty.  3. Increase strength by 1/3 MMT grade in left knee extension to increase tolerance for ADL and prolonged ambulation/transfer activities.  4. Pt to tolerate HEP to improve ROM and independence with ADL's    Long Term Goals: 8 weeks  1.Report decreased left knee pain < / = 1/10 to increase tolerance for returning to longer walking periods.   2.Patient goal: reduce left knee pain and improve transfer ability.   3.Increase strength to >/= 4/5 in left knee extension  to increase tolerance for ADL and transfer activities.  4. Pt will report at >/= 58% on FOTO knee to demonstrate increase in LE function with every day tasks.               Jermaine Dallas, PT

## 2025-06-09 RX ORDER — KETOCONAZOLE 20 MG/ML
SHAMPOO, SUSPENSION TOPICAL
Qty: 120 ML | Refills: 10 | Status: SHIPPED | OUTPATIENT
Start: 2025-06-09

## 2025-06-11 ENCOUNTER — CLINICAL SUPPORT (OUTPATIENT)
Dept: REHABILITATION | Facility: HOSPITAL | Age: 70
End: 2025-06-11
Payer: MEDICARE

## 2025-06-11 DIAGNOSIS — M25.662 DECREASED RANGE OF MOTION OF LEFT KNEE: Primary | ICD-10-CM

## 2025-06-11 PROCEDURE — 97112 NEUROMUSCULAR REEDUCATION: CPT

## 2025-06-11 NOTE — PROGRESS NOTES
"  Outpatient Rehab    Physical Therapy Visit    Patient Name: Cherri Gilmore  MRN: [id number]  YOB: 1955  Encounter Date: 6/11/2025    Therapy Diagnosis:   Encounter Diagnosis   Name Primary?    Decreased range of motion of left knee Yes     Physician: Jennifer Naranjo NP    Physician Orders: Eval and Treat  Medical Diagnosis: Left knee pain  Sciatica  Surgical Diagnosis: Not applicable for this Episode   Surgical Date: Not applicable for this Episode    Visit # / Visits Authorized:  2 / 10  Insurance Authorization Period: 5/16/2025 to 12/31/2025  Date of Evaluation: 5/16/2025  Plan of Care Certification: 5/17/2025 to 7/25/2025      PT/PTA:     Number of PTA visits since last PT visit:   Time In: 1403   Time Out: 1459  Total Time (in minutes): 56   Total Billable Time (in minutes): 30    FOTO:  Intake Score:  %  Survey Score 2:  %  Survey Score 3:  %    Precautions:       Subjective   she has moved houses with her family as of last weekend and she states they have more steps now so it is more difficult for her to get up into the house. states she uses the railings to help her. states she also had some swelling in both legs on Thursday - Sunday last week.  Pain reported as 3/10.      Objective            Treatment:  Therapeutic Exercise  TE 1: Standing heel raises 2 x 12  TE 2: Nustep recumbent stepper level 1.5, 7 mins  TE 3: Box step ups 4 inch box, 2 x 10 with right LE as lead leg  TE 4: Seated heel raises, 9# above knee 2 x 15  Manual Therapy  MT 1: patellar mobilizations grades I/II  Balance/Neuromuscular Re-Education  NMR 1: quad sets 2 x 10 for 5" holds  NMR 2: quad sets with slr from supine 2 x 10  NMR 3: Lateral stepping RTB above ankles, 3 laps x 10 ft  NMR 5: TKEs with GTB 3 x 10 with 3" holds    Time Entry(in minutes):  Manual Therapy Time Entry: 5  Neuromuscular Re-Education Time Entry: 31  Therapeutic Exercise Time Entry: 20    Assessment & Plan   Assessment: Patient presents " with mild left knee supra-patellar swelling and infrapatellar swelling as well as decreased gait speed. She is able to display full passive knee extension today and tolerates step ups well on 4 inch box. She has mild decrease of knee extension in stance phase of gait. Able to tolerate all exercises for work on quad, hip, and calf strengthening to help offload left knee joint.  Evaluation/Treatment Tolerance: Patient tolerated treatment well    The patient will continue to benefit from skilled outpatient physical therapy in order to address the deficits listed in the problem list on the initial evaluation, provide patient and family education, and maximize the patients level of independence in the home and community environments.     The patient's spiritual, cultural, and educational needs were considered, and the patient is agreeable to the plan of care and goals.           Plan:      Goals:   Active       Physical Therapy       Physical Therapy Goal (Progressing)       Start:  05/17/25    Expected End:  07/25/25       Short Term Goals:  4 weeks  1.Report decreased left knee pain  < / = 2/10 to increase tolerance for progressing exercises.   2. Increase knee ROM to 130 degrees of flexion in order to be able to perform ADLs without difficulty.  3. Increase strength by 1/3 MMT grade in left knee extension to increase tolerance for ADL and prolonged ambulation/transfer activities.  4. Pt to tolerate HEP to improve ROM and independence with ADL's    Long Term Goals: 8 weeks  1.Report decreased left knee pain < / = 1/10 to increase tolerance for returning to longer walking periods.   2.Patient goal: reduce left knee pain and improve transfer ability.   3.Increase strength to >/= 4/5 in left knee extension  to increase tolerance for ADL and transfer activities.  4. Pt will report at >/= 58% on FOTO knee to demonstrate increase in LE function with every day tasks.               Jermaine Dallas, PT

## 2025-06-18 ENCOUNTER — CLINICAL SUPPORT (OUTPATIENT)
Dept: REHABILITATION | Facility: HOSPITAL | Age: 70
End: 2025-06-18
Payer: MEDICARE

## 2025-06-18 DIAGNOSIS — M25.662 DECREASED RANGE OF MOTION OF LEFT KNEE: Primary | ICD-10-CM

## 2025-06-18 PROCEDURE — 97112 NEUROMUSCULAR REEDUCATION: CPT

## 2025-06-26 ENCOUNTER — CLINICAL SUPPORT (OUTPATIENT)
Dept: REHABILITATION | Facility: HOSPITAL | Age: 70
End: 2025-06-26
Payer: MEDICARE

## 2025-06-26 DIAGNOSIS — M25.662 DECREASED RANGE OF MOTION OF LEFT KNEE: Primary | ICD-10-CM

## 2025-06-26 PROCEDURE — 97110 THERAPEUTIC EXERCISES: CPT

## 2025-06-26 PROCEDURE — 97112 NEUROMUSCULAR REEDUCATION: CPT

## 2025-06-26 PROCEDURE — 97140 MANUAL THERAPY 1/> REGIONS: CPT

## 2025-06-26 NOTE — PROGRESS NOTES
"  Outpatient Rehab    Physical Therapy Visit    Patient Name: Cherri Gilmore  MRN: [id number]  YOB: 1955  Encounter Date: 6/26/2025    Therapy Diagnosis:   Encounter Diagnosis   Name Primary?    Decreased range of motion of left knee Yes     Physician: Jennifer Naranjo NP    Physician Orders: Eval and Treat  Medical Diagnosis: Left knee pain  Sciatica  Surgical Diagnosis: Not applicable for this Episode   Surgical Date: Not applicable for this Episode  Days Since Last Surgery: Not applicable for this Episode    Visit # / Visits Authorized:  4 / 10  Insurance Authorization Period: 5/16/2025 to 12/31/2025  Date of Evaluation: 5/16/2025  Plan of Care Certification: 5/17/2025 to 7/25/2025      PT/PTA:     Number of PTA visits since last PT visit:   Time In: 1403   Time Out: 1500  Total Time (in minutes): 57   Total Billable Time (in minutes): 55    FOTO:  Intake Score:  %  Survey Score 2:  %  Survey Score 3:  %    Precautions:       Subjective   knee is feeling better lately.  Pain reported as 2/10.      Objective            Treatment:  Therapeutic Exercise  TE 1: Standing heel raises 2 x 12  TE 2: Nustep recumbent stepper level 2.0, 9 mins for lower extremity endurance  TE 3: Box step ups 4 inch box, 2 x 10 with right LE as lead leg  TE 4: Seated heel raises, 9# above knee 2 x 15  Manual Therapy  MT 1: patellar mobilizations grades I/II  MT 2: fat pad mobilizations grades I/II  MT 3: Tibiofemoral distraction EOT, grades I/II  Balance/Neuromuscular Re-Education  NMR 1: quad sets 2 x 10 for 5" holds  NMR 2: quad sets with SLR from supine 2 x 10  NMR 3: Lateral stepping RTB above ankles, 3 laps x 10 ft  NMR 4: Bridges with 3" hold,  3 x 10  NMR 5: TKEs with BTB 3 x 10 with 3" holds  NMR 7: LAQs with 6#, 3 x 10 with 3" holds    Time Entry(in minutes):  Manual Therapy Time Entry: 9  Neuromuscular Re-Education Time Entry: 31  Therapeutic Exercise Time Entry: 15    Assessment & Plan   Assessment: " Patient presents with improvement in mild left knee pain/stiffness in the past week but she still has supra-patellar swelling. She performs all interventions well today. Progressed LAQs without pain.  Will continue with focus on quad, hip, and calf strengthening to help offload left knee joint.  Evaluation/Treatment Tolerance: Patient tolerated treatment well    The patient will continue to benefit from skilled outpatient physical therapy in order to address the deficits listed in the problem list on the initial evaluation, provide patient and family education, and maximize the patients level of independence in the home and community environments.     The patient's spiritual, cultural, and educational needs were considered, and the patient is agreeable to the plan of care and goals.           Plan:      Goals:   Active       Physical Therapy       Physical Therapy Goal (Progressing)       Start:  05/17/25    Expected End:  07/25/25       Short Term Goals:  4 weeks  1.Report decreased left knee pain  < / = 2/10 to increase tolerance for progressing exercises.   2. Increase knee ROM to 130 degrees of flexion in order to be able to perform ADLs without difficulty.  3. Increase strength by 1/3 MMT grade in left knee extension to increase tolerance for ADL and prolonged ambulation/transfer activities.  4. Pt to tolerate HEP to improve ROM and independence with ADL's    Long Term Goals: 8 weeks  1.Report decreased left knee pain < / = 1/10 to increase tolerance for returning to longer walking periods.   2.Patient goal: reduce left knee pain and improve transfer ability.   3.Increase strength to >/= 4/5 in left knee extension  to increase tolerance for ADL and transfer activities.  4. Pt will report at >/= 58% on FOTO knee to demonstrate increase in LE function with every day tasks.               Jermaine Dallas, PT

## 2025-07-04 ENCOUNTER — PATIENT MESSAGE (OUTPATIENT)
Dept: ADMINISTRATIVE | Facility: HOSPITAL | Age: 70
End: 2025-07-04
Payer: MEDICARE

## 2025-07-09 DIAGNOSIS — Z78.0 MENOPAUSE: ICD-10-CM

## 2025-07-22 ENCOUNTER — HOSPITAL ENCOUNTER (OUTPATIENT)
Dept: RADIOLOGY | Facility: HOSPITAL | Age: 70
Discharge: HOME | End: 2025-07-22
Attending: INTERNAL MEDICINE
Payer: MEDICARE

## 2025-07-22 DIAGNOSIS — Z78.0 MENOPAUSE: ICD-10-CM

## 2025-07-22 PROCEDURE — 77080 DXA BONE DENSITY AXIAL: CPT | Mod: TC

## 2025-07-28 ENCOUNTER — TELEPHONE (OUTPATIENT)
Dept: INTERNAL MEDICINE | Facility: CLINIC | Age: 70
End: 2025-07-28
Payer: MEDICARE

## 2025-08-06 ENCOUNTER — CLINICAL SUPPORT (OUTPATIENT)
Dept: REHABILITATION | Facility: HOSPITAL | Age: 70
End: 2025-08-06
Payer: MEDICARE

## 2025-08-06 DIAGNOSIS — M25.662 DECREASED RANGE OF MOTION OF LEFT KNEE: Primary | ICD-10-CM

## 2025-08-06 PROCEDURE — 97112 NEUROMUSCULAR REEDUCATION: CPT

## 2025-08-06 PROCEDURE — 97110 THERAPEUTIC EXERCISES: CPT

## 2025-08-06 PROCEDURE — 97140 MANUAL THERAPY 1/> REGIONS: CPT

## 2025-08-06 NOTE — LETTER
August 7, 2025  Jennifer Naranjo NP  [address] [place] [address]      To whom it may concern,     The attached plan of care is being sent to you for review and reference.    You may indicate your approval by signing the document electronically, or by faxing/mailing a signed copy of the final page of this document back to the attention of Jermaine Dallas, PT:         Plan of Care 8/7/25   Effective from: 8/7/2025  Effective to: 9/30/2025    Active  Plan ID: [id number]           Participants as of 8/7/2025    Name Type Comments Contact Info    Jennifer Naranjo NP Referring Provider  [phone]    Jermaine Dallas PT Physical Therapist        Last Plan Note     Author: Jermaine Dallas, PT Status: Addendum Last edited: 8/6/2025  3:00 PM         Outpatient Rehab    Physical Therapy Progress Note : Updated Plan of Care    Patient Name: Cherri Gilmore  MRN: [id number]  YOB: 1955  Encounter Date: 8/6/2025    Therapy Diagnosis:   Encounter Diagnosis   Name Primary?    Decreased range of motion of left knee Yes     Physician: Jennifer Naranjo NP    Physician Orders: Eval and Treat  Medical Diagnosis: Left knee pain  Sciatica  Surgical Diagnosis: Not applicable for this Episode   Surgical Date: Not applicable for this Episode  Days Since Last Surgery: Not applicable for this Episode    Visit # / Visits Authorized: 5 / 10  Insurance Authorization Period: 5/16/2025 to 12/31/2025  Date of Evaluation: 5/16/2025   Plan of Care Certification: 8/6/2025 to 9/30/2025     PT/PTA:     Number of PTA visits since last PT visit:   Time In: 1459   Time Out: 1555  Total Time (in minutes): 56   Total Billable Time (in minutes): 55    FOTO:  Intake Score (%): 31  Survey Score 2 (%): Not applicable for this Episode  Survey Score 3 (%): Not applicable for this Episode    Precautions:       Subjective   patient states she has been experiencing minimal knee pain and has  "[place] 1[place] the [place]. patient states she has been experiencing intermittent swelling of the lower extremities that she attributes to her chemotherapy treatments.  Pain reported as 2/10.      Objective       Knee Range of Motion   Right Knee   Active (deg) Passive (deg) Pain   Flexion 130 135     Extension 0 0         Left Knee   Active (deg) Passive (deg) Pain   Flexion 125 128     Extension 0 0                     Treatment:  Therapeutic Exercise  TE 1: Standing heel raises 2 x 10  TE 2: Nustep recumbent stepper 8 mins for lower extremity endurance  TE 3: HEP review/update  TE 4: Heel slides for knee flexion 2 x 10  Manual Therapy  MT 1: Patellar mobilizations grades I/II in all directions  MT 2: Fat pad mobilizations grades II/III  MT 3: Tibiofemoral distraction from EOT, grades II/III  Balance/Neuromuscular Re-Education  NMR 1: Quad sets 2 x 10 with 5" holds  NMR 2: Quad sets with SLR, 2 x 10  NMR 3: Lateral stepping with GTB above ankles, 4 laps x 10 ft  NMR 4: LAQs with 6#, 3 x 8 each LE with 3" holds  NMR 5: TKEs into BTB 2 x 15    Time Entry(in minutes):  Manual Therapy Time Entry: 12  Neuromuscular Re-Education Time Entry: 28  Therapeutic Exercise Time Entry: 15    Assessment & Plan   Assessment  Patient presents with continued left knee weaknesses and has improved with her daily walking frequency and duration. Patient remains with decreased patellar mobility and with mild knee flexion range of motion deficits. Patient would benefit from extended plan of care in therapy to improve lower extremity strengthening and gait stability.  Evaluation/Treatment Tolerance: Patient tolerated treatment well    The patient will continue to benefit from skilled outpatient physical therapy in order to address the deficits listed in the problem list on the initial evaluation, provide patient and family education, and maximize the patients level of independence in the home and community environments. "     The patient's spiritual, cultural, and educational needs were considered, and the patient is agreeable to the plan of care and goals.           Goals:   Active       Physical Therapy       Physical Therapy Goal (Progressing)       Start:  05/17/25    Expected End:  09/30/25       Short Term Goals:  4 weeks  1.Report decreased left knee pain  < / = 2/10 to increase tolerance for progressing exercises.   2. Increase knee ROM to 130 degrees of flexion in order to be able to perform ADLs without difficulty.  3. Increase strength by 1/3 MMT grade in left knee extension to increase tolerance for ADL and prolonged ambulation/transfer activities.  4. Pt to tolerate HEP to improve ROM and independence with ADL's    Long Term Goals: 8 weeks  1.Report decreased left knee pain < / = 1/10 to increase tolerance for returning to longer walking periods.   2.Patient goal: reduce left knee pain and improve transfer ability.   3.Increase strength to >/= 4/5 in left knee extension  to increase tolerance for ADL and transfer activities.  4. Pt will report at >/= 58% on FOTO knee to demonstrate increase in LE function with every day tasks.               Jermaine Dallas PT               Sincerely,      Jermaine Dallas PT  [facility]                                                            Dear Jermaine Dallas PT,    RE: Ms. Cherri Gilmore, MRN: [id number]    I certify that I have reviewed the attached plan of care and agree to the details within.        ___________________________  ___________________________  Provider Printed Name   Provider Signed Name      ___________________________  Date and Time

## 2025-08-06 NOTE — PROGRESS NOTES
Outpatient Rehab    Physical Therapy Progress Note : Updated Plan of Care    Patient Name: Cherri Gilmore  MRN: [id number]  YOB: 1955  Encounter Date: 8/6/2025    Therapy Diagnosis:   Encounter Diagnosis   Name Primary?    Decreased range of motion of left knee Yes     Physician: Jennifer Naranjo NP    Physician Orders: Eval and Treat  Medical Diagnosis: Left knee pain  Sciatica  Surgical Diagnosis: Not applicable for this Episode   Surgical Date: Not applicable for this Episode  Days Since Last Surgery: Not applicable for this Episode    Visit # / Visits Authorized: 5 / 10  Insurance Authorization Period: 5/16/2025 to 12/31/2025  Date of Evaluation: 5/16/2025   Plan of Care Certification: 8/6/2025 to 9/30/2025     PT/PTA:     Number of PTA visits since last PT visit:   Time In: 1459   Time Out: 1555  Total Time (in minutes): 56   Total Billable Time (in minutes): 55    FOTO:  Intake Score (%): 31  Survey Score 2 (%): Not applicable for this Episode  Survey Score 3 (%): Not applicable for this Episode    Precautions:       Subjective   patient states she has been experiencing minimal knee pain and has been walking 1[place] the park. patient states she has been experiencing intermittent swelling of the lower extremities that she attributes to her chemotherapy treatments.  Pain reported as 2/10.      Objective       Knee Range of Motion   Right Knee   Active (deg) Passive (deg) Pain   Flexion 130 135     Extension 0 0         Left Knee   Active (deg) Passive (deg) Pain   Flexion 125 128     Extension 0 0                     Treatment:  Therapeutic Exercise  TE 1: Standing heel raises 2 x 10  TE 2: Nustep recumbent stepper 8 mins for lower extremity endurance  TE 3: HEP review/update  TE 4: Heel slides for knee flexion 2 x 10  Manual Therapy  MT 1: Patellar mobilizations grades I/II in all directions  MT 2: Fat pad mobilizations grades II/III  MT 3: Tibiofemoral distraction from  "EOT, grades II/III  Balance/Neuromuscular Re-Education  NMR 1: Quad sets 2 x 10 with 5" holds  NMR 2: Quad sets with SLR, 2 x 10  NMR 3: Lateral stepping with GTB above ankles, 4 laps x 10 ft  NMR 4: LAQs with 6#, 3 x 8 each LE with 3" holds  NMR 5: TKEs into BTB 2 x 15    Time Entry(in minutes):  Manual Therapy Time Entry: 12  Neuromuscular Re-Education Time Entry: 28  Therapeutic Exercise Time Entry: 15    Assessment & Plan   Assessment  Patient presents with continued left knee weaknesses and has improved with her daily walking frequency and duration. Patient remains with decreased patellar mobility and with mild knee flexion range of motion deficits. Patient would benefit from extended plan of care in therapy to improve lower extremity strengthening and gait stability.  Evaluation/Treatment Tolerance: Patient tolerated treatment well    The patient will continue to benefit from skilled outpatient physical therapy in order to address the deficits listed in the problem list on the initial evaluation, provide patient and family education, and maximize the patients level of independence in the home and community environments.     The patient's spiritual, cultural, and educational needs were considered, and the patient is agreeable to the plan of care and goals.           Goals:   Active       Physical Therapy       Physical Therapy Goal (Progressing)       Start:  05/17/25    Expected End:  09/30/25       Short Term Goals:  4 weeks  1.Report decreased left knee pain  < / = 2/10 to increase tolerance for progressing exercises.   2. Increase knee ROM to 130 degrees of flexion in order to be able to perform ADLs without difficulty.  3. Increase strength by 1/3 MMT grade in left knee extension to increase tolerance for ADL and prolonged ambulation/transfer activities.  4. Pt to tolerate HEP to improve ROM and independence with ADL's    Long Term Goals: 8 weeks  1.Report decreased left knee pain < / = 1/10 to increase " tolerance for returning to longer walking periods.   2.Patient goal: reduce left knee pain and improve transfer ability.   3.Increase strength to >/= 4/5 in left knee extension  to increase tolerance for ADL and transfer activities.  4. Pt will report at >/= 58% on FOTO knee to demonstrate increase in LE function with every day tasks.               Jermaine Dallas, PT

## 2025-08-19 ENCOUNTER — CLINICAL SUPPORT (OUTPATIENT)
Dept: REHABILITATION | Facility: HOSPITAL | Age: 70
End: 2025-08-19
Payer: MEDICARE

## 2025-08-19 DIAGNOSIS — M25.662 DECREASED RANGE OF MOTION OF LEFT KNEE: Primary | ICD-10-CM

## 2025-08-19 PROCEDURE — 97112 NEUROMUSCULAR REEDUCATION: CPT

## 2025-08-19 PROCEDURE — 97110 THERAPEUTIC EXERCISES: CPT

## 2025-08-19 PROCEDURE — 97140 MANUAL THERAPY 1/> REGIONS: CPT

## (undated) DEVICE — INSERT CUSHIONPRONE VIEW LARGE

## (undated) DEVICE — NDL SPINAL 20GX3.5 HUB

## (undated) DEVICE — BAG INZII TISS RETRV 12/15MM

## (undated) DEVICE — GLOVE SENSICARE PI SURG 6.5

## (undated) DEVICE — TIP YANKAUERS BULB NO VENT

## (undated) DEVICE — Device

## (undated) DEVICE — CONTAINER SPEC OR STRL 4.5OZ

## (undated) DEVICE — PORT ACCESS 8MM W/120MM LOW

## (undated) DEVICE — SUT VICRYL CTD 2-0 GI 27 SH

## (undated) DEVICE — MANIPULATOR VCARE PLUS 37MM LG

## (undated) DEVICE — SUT V-LOC ABSRB WOUND CLSR

## (undated) DEVICE — OBTURATOR BLADELESS 8MM XI CLR

## (undated) DEVICE — SET TRI-LUMEN FILTERED TUBE

## (undated) DEVICE — SEAL UNIVERSAL 5MM-8MM XI

## (undated) DEVICE — DRAPE T TRNSVRS LAP 102X78X121

## (undated) DEVICE — GOWN NONREINF SET-IN SLV XL

## (undated) DEVICE — SOL IRRI STRL WATER 1000ML

## (undated) DEVICE — APPLICATOR CHLORAPREP ORN 26ML

## (undated) DEVICE — SOL POVIDONE SCRUB IODINE 4 OZ

## (undated) DEVICE — IRRIGATOR ENDOSCOPY DISP.

## (undated) DEVICE — SUT PROLENE 0 CT1 30IN BLUE

## (undated) DEVICE — DRAPE ARM DAVINCI XI

## (undated) DEVICE — SOL IRR SOD CHL .9% POUR

## (undated) DEVICE — SYR 10CC LUER LOCK

## (undated) DEVICE — SYS SEE SHARP SCP ANTIFG LNG

## (undated) DEVICE — SOL ELECTROLUBE ANTI-STIC

## (undated) DEVICE — SOL NORMAL USPCA 0.9%

## (undated) DEVICE — NDL INSUFFLATION VERRES 120MM

## (undated) DEVICE — SUT MCRYL PLUS 4-0 PS2 27IN

## (undated) DEVICE — DRAPE COLUMN DAVINCI XI

## (undated) DEVICE — JELLY SURGILUBE 5GR

## (undated) DEVICE — KIT WING PAD POSITIONING

## (undated) DEVICE — ELECTRODE BLD EXT INSUL 1

## (undated) DEVICE — SYR B-D DISP CONTROL 10CC100/C

## (undated) DEVICE — SOL POVIDONE PREP IODINE 4 OZ

## (undated) DEVICE — COVER TIP CURVED SCISSORS XI

## (undated) DEVICE — TRAY DO THE ROBOT

## (undated) DEVICE — SYR 50ML CATH TIP

## (undated) DEVICE — ADHESIVE DERMABOND ADVANCED

## (undated) DEVICE — ELECTRODE REM PLYHSV RETURN 9